# Patient Record
Sex: MALE | Race: WHITE | NOT HISPANIC OR LATINO
[De-identification: names, ages, dates, MRNs, and addresses within clinical notes are randomized per-mention and may not be internally consistent; named-entity substitution may affect disease eponyms.]

---

## 2018-05-29 ENCOUNTER — APPOINTMENT (OUTPATIENT)
Dept: UROLOGY | Facility: CLINIC | Age: 73
End: 2018-05-29
Payer: MEDICARE

## 2018-05-29 DIAGNOSIS — Z86.79 PERSONAL HISTORY OF OTHER DISEASES OF THE CIRCULATORY SYSTEM: ICD-10-CM

## 2018-05-29 DIAGNOSIS — Z80.3 FAMILY HISTORY OF MALIGNANT NEOPLASM OF BREAST: ICD-10-CM

## 2018-05-29 DIAGNOSIS — Z80.1 FAMILY HISTORY OF MALIGNANT NEOPLASM OF TRACHEA, BRONCHUS AND LUNG: ICD-10-CM

## 2018-05-29 DIAGNOSIS — Z86.39 PERSONAL HISTORY OF OTHER ENDOCRINE, NUTRITIONAL AND METABOLIC DISEASE: ICD-10-CM

## 2018-05-29 PROCEDURE — 51741 ELECTRO-UROFLOWMETRY FIRST: CPT

## 2018-05-29 PROCEDURE — 99204 OFFICE O/P NEW MOD 45 MIN: CPT | Mod: 25

## 2018-11-27 ENCOUNTER — APPOINTMENT (OUTPATIENT)
Dept: UROLOGY | Facility: CLINIC | Age: 73
End: 2018-11-27
Payer: MEDICARE

## 2018-11-27 VITALS
DIASTOLIC BLOOD PRESSURE: 74 MMHG | BODY MASS INDEX: 26.32 KG/M2 | SYSTOLIC BLOOD PRESSURE: 130 MMHG | WEIGHT: 188 LBS | HEIGHT: 71 IN

## 2018-11-27 PROCEDURE — 99213 OFFICE O/P EST LOW 20 MIN: CPT

## 2019-12-18 ENCOUNTER — APPOINTMENT (OUTPATIENT)
Dept: UROLOGY | Facility: CLINIC | Age: 74
End: 2019-12-18
Payer: MEDICARE

## 2019-12-18 VITALS
HEART RATE: 66 BPM | DIASTOLIC BLOOD PRESSURE: 80 MMHG | WEIGHT: 188 LBS | HEIGHT: 71 IN | SYSTOLIC BLOOD PRESSURE: 138 MMHG | BODY MASS INDEX: 26.32 KG/M2

## 2019-12-18 PROCEDURE — 99213 OFFICE O/P EST LOW 20 MIN: CPT

## 2019-12-18 NOTE — ASSESSMENT
[FreeTextEntry1] : Very pleasant 74-year-old gentleman presents for followup of BPH and elevated PSA\par -PSA 4.08 this year stable from 4.08 last year\par -Continue finasteride\par -Follow up in one year

## 2019-12-18 NOTE — PHYSICAL EXAM
[General Appearance - Well Developed] : well developed [General Appearance - Well Nourished] : well nourished [Normal Appearance] : normal appearance [General Appearance - In No Acute Distress] : no acute distress [Well Groomed] : well groomed [Abdomen Soft] : soft [Urethral Meatus] : meatus normal [Costovertebral Angle Tenderness] : no ~M costovertebral angle tenderness [Abdomen Tenderness] : non-tender [Scrotum] : the scrotum was normal [Urinary Bladder Findings] : the bladder was normal on palpation [Testes Mass (___cm)] : there were no testicular masses [No Prostate Nodules] : no prostate nodules [Edema] : no peripheral edema [] : no respiratory distress [Exaggerated Use Of Accessory Muscles For Inspiration] : no accessory muscle use [Respiration, Rhythm And Depth] : normal respiratory rhythm and effort [Oriented To Time, Place, And Person] : oriented to person, place, and time [Affect] : the affect was normal [Mood] : the mood was normal [Not Anxious] : not anxious [Normal Station and Gait] : the gait and station were normal for the patient's age [No Focal Deficits] : no focal deficits [No Palpable Adenopathy] : no palpable adenopathy

## 2019-12-18 NOTE — HISTORY OF PRESENT ILLNESS
[FreeTextEntry1] : Very pleasant 74-year-old gentleman who presents for followup of elevated PSA. Patient underwent prostate biopsy in 2013 for a PSA of 9. This demonstrated benign prostate tissue. He was subsequently started on finasteride and PSA decreased to 4 and has been in this range since that time. Last year PSA was 4.08. This year, PSA is 4.08. No change in his urinary symptoms. No other complaints. [Urinary Retention] : no urinary retention [Urinary Frequency] : no urinary frequency [Urinary Urgency] : no urinary urgency [Straining] : no straining [Nocturia] : no nocturia [Dysuria] : no dysuria [Hematuria - Gross] : no gross hematuria [Weak Stream] : no weak stream [Bladder Spasm] : no bladder spasm [Abdominal Pain] : no abdominal pain [Flank Pain] : no flank pain [Fever] : no fever [Nausea] : no nausea [Fatigue] : no fatigue [Anorexia] : no anorexia

## 2020-07-07 ENCOUNTER — APPOINTMENT (OUTPATIENT)
Dept: UROLOGY | Facility: CLINIC | Age: 75
End: 2020-07-07

## 2020-07-21 ENCOUNTER — INPATIENT (INPATIENT)
Facility: HOSPITAL | Age: 75
LOS: 13 days | Discharge: HOME | DRG: 98 | End: 2020-08-04
Attending: PSYCHIATRY & NEUROLOGY | Admitting: PSYCHIATRY & NEUROLOGY
Payer: COMMERCIAL

## 2020-07-21 VITALS
TEMPERATURE: 98 F | HEART RATE: 57 BPM | SYSTOLIC BLOOD PRESSURE: 117 MMHG | RESPIRATION RATE: 17 BRPM | WEIGHT: 184.97 LBS | HEIGHT: 71 IN | DIASTOLIC BLOOD PRESSURE: 69 MMHG

## 2020-07-21 DIAGNOSIS — R90.89 OTHER ABNORMAL FINDINGS ON DIAGNOSTIC IMAGING OF CENTRAL NERVOUS SYSTEM: ICD-10-CM

## 2020-07-21 LAB
ALBUMIN SERPL ELPH-MCNC: 3.9 G/DL — SIGNIFICANT CHANGE UP (ref 3.3–5)
ALP SERPL-CCNC: 65 U/L — SIGNIFICANT CHANGE UP (ref 40–120)
ALT FLD-CCNC: 22 U/L — SIGNIFICANT CHANGE UP (ref 10–45)
ANION GAP SERPL CALC-SCNC: 11 MMOL/L — SIGNIFICANT CHANGE UP (ref 5–17)
AST SERPL-CCNC: 25 U/L — SIGNIFICANT CHANGE UP (ref 10–40)
BASOPHILS # BLD AUTO: 0.04 K/UL — SIGNIFICANT CHANGE UP (ref 0–0.2)
BASOPHILS NFR BLD AUTO: 0.6 % — SIGNIFICANT CHANGE UP (ref 0–2)
BILIRUB SERPL-MCNC: 0.5 MG/DL — SIGNIFICANT CHANGE UP (ref 0.2–1.2)
BUN SERPL-MCNC: 33 MG/DL — HIGH (ref 7–23)
CALCIUM SERPL-MCNC: 9.4 MG/DL — SIGNIFICANT CHANGE UP (ref 8.4–10.5)
CHLORIDE SERPL-SCNC: 101 MMOL/L — SIGNIFICANT CHANGE UP (ref 96–108)
CO2 SERPL-SCNC: 25 MMOL/L — SIGNIFICANT CHANGE UP (ref 22–31)
CREAT SERPL-MCNC: 1.22 MG/DL — SIGNIFICANT CHANGE UP (ref 0.5–1.3)
CRP SERPL-MCNC: 0.22 MG/DL — SIGNIFICANT CHANGE UP (ref 0–0.4)
EOSINOPHIL # BLD AUTO: 0.1 K/UL — SIGNIFICANT CHANGE UP (ref 0–0.5)
EOSINOPHIL NFR BLD AUTO: 1.4 % — SIGNIFICANT CHANGE UP (ref 0–6)
ERYTHROCYTE [SEDIMENTATION RATE] IN BLOOD: 48 MM/HR — HIGH (ref 0–20)
GLUCOSE SERPL-MCNC: 130 MG/DL — HIGH (ref 70–99)
HCT VFR BLD CALC: 41.8 % — SIGNIFICANT CHANGE UP (ref 39–50)
HGB BLD-MCNC: 13.8 G/DL — SIGNIFICANT CHANGE UP (ref 13–17)
IMM GRANULOCYTES NFR BLD AUTO: 0.4 % — SIGNIFICANT CHANGE UP (ref 0–1.5)
LYMPHOCYTES # BLD AUTO: 1.22 K/UL — SIGNIFICANT CHANGE UP (ref 1–3.3)
LYMPHOCYTES # BLD AUTO: 17.5 % — SIGNIFICANT CHANGE UP (ref 13–44)
MCHC RBC-ENTMCNC: 29.9 PG — SIGNIFICANT CHANGE UP (ref 27–34)
MCHC RBC-ENTMCNC: 33 GM/DL — SIGNIFICANT CHANGE UP (ref 32–36)
MCV RBC AUTO: 90.5 FL — SIGNIFICANT CHANGE UP (ref 80–100)
MONOCYTES # BLD AUTO: 0.94 K/UL — HIGH (ref 0–0.9)
MONOCYTES NFR BLD AUTO: 13.5 % — SIGNIFICANT CHANGE UP (ref 2–14)
NEUTROPHILS # BLD AUTO: 4.63 K/UL — SIGNIFICANT CHANGE UP (ref 1.8–7.4)
NEUTROPHILS NFR BLD AUTO: 66.6 % — SIGNIFICANT CHANGE UP (ref 43–77)
NRBC # BLD: 0 /100 WBCS — SIGNIFICANT CHANGE UP (ref 0–0)
PLATELET # BLD AUTO: 236 K/UL — SIGNIFICANT CHANGE UP (ref 150–400)
POTASSIUM SERPL-MCNC: 3.5 MMOL/L — SIGNIFICANT CHANGE UP (ref 3.5–5.3)
POTASSIUM SERPL-SCNC: 3.5 MMOL/L — SIGNIFICANT CHANGE UP (ref 3.5–5.3)
PROT SERPL-MCNC: 6.7 G/DL — SIGNIFICANT CHANGE UP (ref 6–8.3)
RBC # BLD: 4.62 M/UL — SIGNIFICANT CHANGE UP (ref 4.2–5.8)
RBC # FLD: 13 % — SIGNIFICANT CHANGE UP (ref 10.3–14.5)
SARS-COV-2 RNA SPEC QL NAA+PROBE: SIGNIFICANT CHANGE UP
SODIUM SERPL-SCNC: 137 MMOL/L — SIGNIFICANT CHANGE UP (ref 135–145)
WBC # BLD: 6.96 K/UL — SIGNIFICANT CHANGE UP (ref 3.8–10.5)
WBC # FLD AUTO: 6.96 K/UL — SIGNIFICANT CHANGE UP (ref 3.8–10.5)

## 2020-07-21 PROCEDURE — 93010 ELECTROCARDIOGRAM REPORT: CPT

## 2020-07-21 PROCEDURE — 99285 EMERGENCY DEPT VISIT HI MDM: CPT

## 2020-07-21 PROCEDURE — 99223 1ST HOSP IP/OBS HIGH 75: CPT

## 2020-07-21 RX ORDER — ENOXAPARIN SODIUM 100 MG/ML
40 INJECTION SUBCUTANEOUS DAILY
Refills: 0 | Status: DISCONTINUED | OUTPATIENT
Start: 2020-07-21 | End: 2020-08-04

## 2020-07-21 RX ORDER — SIMVASTATIN 20 MG/1
10 TABLET, FILM COATED ORAL AT BEDTIME
Refills: 0 | Status: DISCONTINUED | OUTPATIENT
Start: 2020-07-21 | End: 2020-08-04

## 2020-07-21 RX ORDER — AMLODIPINE BESYLATE 2.5 MG/1
5 TABLET ORAL DAILY
Refills: 0 | Status: DISCONTINUED | OUTPATIENT
Start: 2020-07-21 | End: 2020-07-23

## 2020-07-21 RX ORDER — FINASTERIDE 5 MG/1
5 TABLET, FILM COATED ORAL DAILY
Refills: 0 | Status: DISCONTINUED | OUTPATIENT
Start: 2020-07-21 | End: 2020-08-04

## 2020-07-21 RX ADMIN — ENOXAPARIN SODIUM 40 MILLIGRAM(S): 100 INJECTION SUBCUTANEOUS at 22:23

## 2020-07-21 RX ADMIN — FINASTERIDE 5 MILLIGRAM(S): 5 TABLET, FILM COATED ORAL at 22:22

## 2020-07-21 RX ADMIN — SIMVASTATIN 10 MILLIGRAM(S): 20 TABLET, FILM COATED ORAL at 22:22

## 2020-07-21 NOTE — ED ADULT NURSE NOTE - OBJECTIVE STATEMENT
75y M with Pmhx of BPH and high cholesterol presents to the ED with intermittent upper extremity weakness more so on the L than R. Reports that he has the spasms that starts in the LUE and moves down to the forearm and stiffens. Yesterday pt had a MRI and his PMD sent in. On assessment, AOx3. PERRL 3 mm. HICKS. no facial droop, slurred speech, and arm drift. strength equal in all extremities. no vision changes. Gait unsteady. Denies dizziness, headaches, lightheadedness, numbness and tingling. Breathing spontaneously and unlabored. Sating 97% on Room air. Denies cough, SOB and CP. S1 and S2 heard. No Peripheral edema. Cap refill 2s. No JVD. Peripheral pulses strong and equal bilaterally. Denies CP, SOB and palpitations. Abdomen soft, nontender, nondistended, negative CVA tenderness, positive bowel sounds in all four quadrants. Pt is continent. Denies n/v/d, dysuria, melena and hematuria. IV placed 20g in Left. Labs drawn and sent. Pt safety maintained. Call bell within reach. Side rails in upward position. Pt awaiting dispo. Neuro flowsheet placed in chart.

## 2020-07-21 NOTE — H&P ADULT - ATTENDING COMMENTS
agree with history as above.  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    Plan: review brain MRI  CT chest/ abdomen/pelvis to r/o neoplasia  VEEG monitoring  Lp for paraneoplastic panel from CSF and serum for TRP/TPO, ESR, CRP, MBP, OCB    consider steroids/IVIG as outpatient as well as PLEX if events captured and typical for LG1

## 2020-07-21 NOTE — ED PROVIDER NOTE - CLINICAL SUMMARY MEDICAL DECISION MAKING FREE TEXT BOX
kinza pt with waxing wainng spasm and weakness left sided upper > lower x 1month -- no fever no rash no travel o bug bite no ha -- no ams - sent for outpt mri with abn enhancement of limbic area concerning for enceph v sz v inflam - in light of intermittent nature and weakness after spasm - more likely sz related - will dw neuro - possible eeg - likely admission

## 2020-07-21 NOTE — H&P ADULT - ASSESSMENT
74 y/o male with PMHx BPH, HTN, and HLD presents to ED with intermittent left upper extremity tonic spasms with progression to right upper and lower extremities and multiple falls since June, concerning for seizures. Outpatient MRI brain without contrast demonstrates hyperenhancement of right limbic lobe (pending inpatient neuroradiology read). Patient is not encephalopathic at this time. There are no focal neurological deficits on exam.     Impression: Bilateral upper extremity tonic clonic activity with involvement of right lower extremity secondary to possible focal seizure due to unknown etiology. Differentials remain broad and include autoimmune encephalitis vs paraneoplastic encephalitis vs. other inflammatory encephalitis vs. infectious encephalitis vs. less likely focal dystonia    Plan  -Continuous VEEG  -CT chest/abdomen/pelvis with contrast for paraneoplastic workup  -Repeat mri brain with contrast  -Consider LP depending on above results  -STAT CT head for acute change in mental status  -Monitor for signs of infection    Case discussed with Dr. Justice. 76 y/o male with PMHx BPH, HTN, and HLD presents to ED with intermittent left upper extremity tonic clonic activity with progression to right upper and lower extremities and multiple falls since June, concerning for seizures. Outpatient MRI brain without contrast demonstrates hyperenhancement of right limbic lobe (pending inpatient neuroradiology read). Patient is not encephalopathic at this time. There are no focal neurological deficits on exam.     Impression: Bilateral upper extremity tonic clonic activity with involvement of right lower extremity secondary to possible focal seizure due to unknown etiology vs faciobrachial dystonic seizure. Differentials remain broad and include autoimmune encephalitis including anti-LGl1 encephalitis vs paraneoplastic encephalitis vs. other inflammatory encephalitis vs. infectious encephalitis vs. less likely focal dystonia    Plan  -Continuous VEEG  -CT chest/abdomen/pelvis with contrast for paraneoplastic workup  -Follow up serum autoimmune workup  -Obtain CD with images of outpatient MRI brain without contrast done 7/20/20  -Repeat MRI brain with contrast   -Consider LP depending on above results  -Consider glucocorticoids, IVIG  -STAT CT head for acute change in mental status  -Monitor for signs of infection  -Seizure, fall and aspiration precautions.  Avoid sleep deprivation.   -IV lorazepam 2mg IV prn for seizure activity lasting >3-5mins, >2x/hr, >3x/day, or if GTC , repeat after 1 minute (max dose 0.1 mg/kg)      Case discussed with Dr. Justice. 74 y/o male with PMHx BPH, HTN, and HLD presents to ED with intermittent left upper extremity tonic clonic activity with progression to right upper and lower extremities and multiple falls since June, concerning for seizures. Outpatient MRI brain without contrast demonstrates hyperenhancement of right limbic lobe (pending inpatient neuroradiology read). Patient is not encephalopathic at this time. There are no focal neurological deficits on exam.     Impression: Bilateral upper extremity tonic clonic activity with involvement of right lower extremity secondary to possible focal seizure due to unknown etiology vs faciobrachial dystonic seizure. Differentials remain broad and include autoimmune encephalitis including anti-LGl1 encephalitis vs paraneoplastic encephalitis vs. other inflammatory encephalitis vs. infectious encephalitis vs. less likely focal dystonia    Plan  -Continuous VEEG  -Follow up serum autoimmune workup  -Obtain CD with images of outpatient MRI brain without contrast done 7/20/20  -Repeat MRI brain with contrast   -Consider LP to send for autoimmune/paraneoplatic/infectious panels depending on above results  -Consider CT chest/abdomen/pelvis with contrast for paraneoplastic workup  -Consider glucocorticoids, IVIG  -STAT CT head for acute change in mental status  -Monitor for signs of infection  -Seizure, fall and aspiration precautions.  Avoid sleep deprivation.   -IV lorazepam 2mg IV prn for seizure activity lasting >3-5mins, >2x/hr, >3x/day, or if GTC , repeat after 1 minute (max dose 0.1 mg/kg)      Case discussed with Dr. Justice. 74 y/o male with PMHx BPH, HTN, and HLD presents to ED with intermittent left upper extremity tonic clonic activity with progression to right upper and lower extremities and multiple falls since June, concerning for seizures. Outpatient MRI brain without contrast demonstrates hyperenhancement of right limbic lobe (pending inpatient neuroradiology read). Patient is not encephalopathic at this time. There are no focal neurological deficits on exam.     Impression: Bilateral upper extremity tonic clonic activity with involvement of right lower extremity secondary to possible focal seizure due to unknown etiology vs faciobrachial dystonic seizure. Differentials remain broad and include autoimmune encephalitis including anti-LGl1 encephalitis vs paraneoplastic encephalitis vs. other inflammatory encephalitis vs. infectious encephalitis vs. less likely focal dystonia    Plan  -Continuous VEEG  -Follow up serum autoimmune workup  -Obtain CD with images of outpatient MRI brain without contrast done 7/20/20  -Repeat MRI brain with contrast   -Consider LP to send for autoimmune/paraneoplatic/infectious panels depending on above results  -Consider CT chest/abdomen/pelvis with contrast for paraneoplastic workup  -Consider glucocorticoids, IVIG  -STAT CT head for acute change in mental status  -Monitor for signs of infection  -Seizure, fall and aspiration precautions.  Avoid sleep deprivation.   -Patient's emergency contact is his close friend, Belinda [phone].  -IV lorazepam 2mg IV prn for seizure activity lasting >3-5mins, >2x/hr, >3x/day, or if GTC , repeat after 1 minute (max dose 0.1 mg/kg)      Case discussed with Dr. Justice.

## 2020-07-21 NOTE — ED ADULT NURSE REASSESSMENT NOTE - NS ED NURSE REASSESS COMMENT FT1
Pt remains in the ED. Resting comfortably in bed. A&Ox3. Breathing spontaneously and unlabored. NAD. VSS. Pt ambulated to the bathroom with ED staff. Pt admitted to neuro for abnormal MRI. Pt safety maintained. Will continue to monitor. Awaiting bed.

## 2020-07-21 NOTE — ED ADULT TRIAGE NOTE - NS ED NURSE DIRECT TO ROOM YN
No
You can access the FollowMyHealth Patient Portal offered by [facility] by registering at the following website: [email or web address]. By joining [employer]’s FollowMyHealth portal, you will also be able to view your health information using other applications (apps) compatible with our system.

## 2020-07-21 NOTE — H&P ADULT - NSHPLABSRESULTS_GEN_ALL_CORE
13.8   6.96  )-----------( 236      ( 21 Jul 2020 08:53 )             41.8 CBC Full  -  ( 21 Jul 2020 08:53 )  WBC Count : 6.96 K/uL  RBC Count : 4.62 M/uL  Hemoglobin : 13.8 g/dL  Hematocrit : 41.8 %  Platelet Count - Automated : 236 K/uL  Mean Cell Volume : 90.5 fl  Mean Cell Hemoglobin : 29.9 pg  Mean Cell Hemoglobin Concentration : 33.0 gm/dL  Auto Neutrophil # : 4.63 K/uL  Auto Lymphocyte # : 1.22 K/uL  Auto Monocyte # : 0.94 K/uL  Auto Eosinophil # : 0.10 K/uL  Auto Basophil # : 0.04 K/uL  Auto Neutrophil % : 66.6 %  Auto Lymphocyte % : 17.5 %  Auto Monocyte % : 13.5 %  Auto Eosinophil % : 1.4 %  Auto Basophil % : 0.6 %

## 2020-07-21 NOTE — H&P ADULT - NSHPPHYSICALEXAM_GEN_ALL_CORE
General Exam:   General appearance: No acute distress    Cardiac:  Pulm:                 Neurological Exam:  Mental Status: Orientated to self, date and place.  Attention intact.  No dysarthria. Speech fluent.  Cranial Nerves:   PERRL, EOMI, VFF, no nystagmus.    CN V1-3 intact to light touch .  No facial asymmetry.  Hearing intact to finger rub bilaterally.  Tongue, uvula and palate midline.  Sternocleidomastoid and Trapezius intact bilaterally.    Motor:   Tone: normal.                  Strength:     [] Upper extremity                      Delt       Bicep    Tricep                                                  R         5/5 5/5 5/5 5/5                                               L          5/5 5/5 5/5 5/5  [] Lower extremity                       HF          KE          KF        DF         PF                                               R        5/5 5/5 5/5 5/5 5/5                                               L         5/5 5/5 5/5 5/5 5/5  Pronator drift: none                 Dysmetria: None to finger-nose-finger or heel-shin-heel  No truncal ataxia.    Tremor: No resting, postural or action tremor.  No myoclonus.    Sensation: intact to light touch, pinprick, vibration and proprioception    Deep Tendon Reflexes:     Biceps          Triceps      BR        Patellar        Ankle         Babinski                                         R       2+                   2+           2+            2+               2+           downgoing                                         L        2+                  2+           2+            2+               2+           downgoing    Gait: normal. General: No acute distress, lying in bed comfortably    Neurological Exam  - Mental Status:  Alert and oriented to person, place, time, and president; speech is fluent with intact naming, repetition, and comprehension  - Cranial Nerves II-XII:    II:  PERRLA; visual fields are full to confrontation  III, IV, VI:  EOMI, no nystagmus  V:  facial sensation is intact in the V1-V3 distribution bilaterally.  VII:  face is symmetric with normal eye closure and smile  VIII:  hearing is intact to finger rub  IX, X:  uvula is midline and soft palate rises symmetrically  XI:  head turning and shoulder shrug are intact bilaterally  XII:  tongue protrudes in the midline  - Motor:  strength is 5/5 throughout; normal muscle bulk and tone throughout; no pronator drift  - Reflexes:  2+ and symmetric at the biceps, triceps, brachioradialis, knees, and ankles;  plantar reflexes downgoing bilaterally  - Sensory:  intact to light touch and pinprick, symmetrical in all extremities  - Coordination:  finger-nose-finger and heel-knee-shin intact without dysmetria  - Gait: wide based gait, unsteady; unable to complete tandem gait; Romberg testing is positive. General: No acute distress, lying in bed comfortably  Neurological Exam  - Mental Status:  Alert and oriented to person, place, time, and president; speech is fluent with intact naming, repetition, and comprehension. Able to spell WORLD backwards. 3 word recall intact.  - Cranial Nerves II-XII:    II:  PERRLA; visual fields are full to confrontation  III, IV, VI:  EOMI, no nystagmus  V:  facial sensation is intact in the V1-V3 distribution bilaterally.  VII:  face is symmetric with normal eye closure and smile  VIII:  hearing is intact to finger rub  IX, X:  uvula is midline and soft palate rises symmetrically  XI:  head turning and shoulder shrug are intact bilaterally  XII:  tongue protrudes in the midline  - Motor:  strength is 5/5 throughout; normal muscle bulk and tone throughout; no pronator drift  - Reflexes:  2+ and symmetric at the biceps, triceps, brachioradialis, knees, and ankles;  plantar reflexes downgoing bilaterally  - Sensory:  intact to light touch and pinprick, symmetrical in all extremities  - Coordination:  finger-nose-finger and heel-knee-shin intact without dysmetria  - Gait: wide based gait, unsteady; unable to complete tandem gait; Romberg testing is positive.

## 2020-07-21 NOTE — CHART NOTE - NSCHARTNOTEFT_GEN_A_CORE
Prior outpatient MRI report provided by Dr. Gloria received today. Impression of results as below:    IMPRESSION:  Asymmetric FLAIR hyperintensity in the right limbic lobe involving the hippocampal gyrus. Differential diagnosis includes, but is not limited to, infectious/inflammatory encephalitis, limbic encephalitis and sequela of epilepsy/post ictal edema. Additional mild chronic microvascular changes without acute infarct.

## 2020-07-21 NOTE — H&P ADULT - HISTORY OF PRESENT ILLNESS
HPI:  74 y/o male with PMHx BPH and HLD presenting with intermittent LUE spasms and weakness since early June. Patient had an MRI brain showing enhancement of right limbic. He denies abnormal shaking, changes in behavior, urinary incontinence, tongue biting, loss of consciousness, syncope, confusion.    Home medications:    PAST MEDICAL & SURGICAL HISTORY:  HLD (hyperlipidemia)  BPH (benign prostatic hyperplasia)  No significant past surgical history    FAMILY HISTORY:    Allergies    No Known Allergies    Intolerances        SHx - No smoking, No ETOH, No drug abuse      Review of Systems:  CONSTITUTIONAL:   HEENT:  No visual loss, blurred vision, double vision.  No hearing loss, sneezing, congestion, runny nose or sore throat.  SKIN:  No rash or itching.  CARDIOVASCULAR:  No chest pain, chest pressure or chest discomfort. No palpitations or edema.  RESPIRATORY:  No shortness of breath, cough or sputum.  GASTROINTESTINAL:  No anorexia, nausea, vomiting or diarrhea. No abdominal pain.  GENITOURINARY:  NO dysuria. No increased frequency. No retention. No incontinence.  NEUROLOGICAL:  See HPI  MUSCULOSKELETAL:  No muscle, back pain, joint pain or stiffness.  HEMATOLOGIC:  No anemia, bleeding or bruising.  PSYCHIATRIC:    ENDOCRINOLOGIC:  No reports of sweating, cold or heat intolerance. No polyuria or polydipsia. HPI:  76 y/o male with PMHx BPH, HTN, and HLD presents to ED with intermittent left upper extremity spasms and weakness since the first week of June. Patient reports that symptoms have progressed to include his right upper and lower extremity since late June. He states that in the beginning of June, he had pain below his left ear. A couple weeks later, patient started to have stiffness of his left upper extremity that felt like spasms and accompanied by shaking. He has started to have involvement of his right side shortly after that included his right upper and right lower extremity. The shaking is usually followed by weakness, which lasts a few minutes before he regains function. The weakness in his arms has led him to break dishes multiple times. He does not know how often his episodes occur, but says they are very frequent when he does his daily activities. He cannot recall exactly when the most recent episode was. The patient has had multiple falls since June, the last one was a couple weeks ago that he attributes to loss of balance. He denies syncope, loss of consciousness, tongue biting, and urinary incontinence. Patient does not ambulate with mechanical assistance at baseline. Patient lives alone and says that the episodes have not been witnessed.  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     Patient denies fevers, chills, abdominal pain, cough, chest pain, nausea, vomiting, numbness, tingling, headaches, vision changes, sick contacts, confusion, memory difficulties.    Home medications:  Finasteride  Amlodipine  Simvastatin    PAST MEDICAL & SURGICAL HISTORY:  HLD (hyperlipidemia)  BPH (benign prostatic hyperplasia)  No significant past surgical history    Allergies  No Known Allergies    SHx - No smoking, No ETOH, No drug abuse    Review of Systems:  CONSTITUTIONAL:   HEENT:  No visual loss, blurred vision, double vision.  No hearing loss, sneezing, congestion, runny nose or sore throat.  SKIN:  No rash or itching.  CARDIOVASCULAR:  No chest pain, chest pressure or chest discomfort. No palpitations or edema.  RESPIRATORY:  No shortness of breath, cough or sputum.  GASTROINTESTINAL:  No anorexia, nausea, vomiting or diarrhea. No abdominal pain.  GENITOURINARY:  No dysuria. No increased frequency. No retention. No incontinence.  NEUROLOGICAL:  See HPI  MUSCULOSKELETAL:  No muscle, back pain, joint pain or stiffness. HPI:  76 y/o male with PMHx BPH, HTN, and HLD presents to ED with intermittent left upper extremity spasms and weakness since the first week of June. Patient reports that symptoms have progressed to include his right upper and lower extremity since late June. He states that in the beginning of June, he had pain below his left ear. A couple weeks later, patient started to have stiffness of his left upper extremity that felt like spasms and was accompanied by shaking. He then began to have involvement of his right side with involvement of his right upper and right lower extremity. The shaking is usually followed by weakness, which lasts a few minutes before he regains function. The weakness in his arms has led him to break dishes multiple times. Patient states that holding a weighted object may precipitate the episodes. He does not know how often the episodes occur, but says they are very frequent when he does his daily activities. He cannot recall exactly when the last one was. The patient has had multiple falls since June, most recently a couple weeks ago that he attributes to loss of balance. He denies syncope, loss of consciousness, tongue biting, and urinary incontinence. Patient denies previous history of a neurological disorder, including seizures. Patient does not ambulate with mechanical assistance at baseline. He lives alone and says that the episodes have not been witnessed.  Patient had an MRI brain without contrast yesterday after going to a private neurologist. He states that his friend Belinda will bring in CD with images to be reviewed.    Patient denies weight loss, fevers, chills, abdominal pain, cough, chest pain, nausea, vomiting, numbness, tingling, headaches, vision changes, sick contacts, confusion, memory difficulties, change in behavior/personality.    Home medications:  Finasteride  Amlodipine  Simvastatin    PAST MEDICAL & SURGICAL HISTORY:  HLD (hyperlipidemia)  BPH (benign prostatic hyperplasia)  No significant past surgical history    Allergies  No Known Allergies    SHx - No smoking, No ETOH, No drug abuse    Review of Systems:  CONSTITUTIONAL:   HEENT:  No visual loss, blurred vision, double vision.  No hearing loss, sneezing, congestion, runny nose or sore throat.  SKIN:  No rash or itching.  CARDIOVASCULAR:  No chest pain, chest pressure or chest discomfort. No palpitations or edema.  RESPIRATORY:  No shortness of breath, cough or sputum.  GASTROINTESTINAL:  No anorexia, nausea, vomiting or diarrhea. No abdominal pain.  GENITOURINARY:  No dysuria. No increased frequency. No retention. No incontinence.  NEUROLOGICAL:  See HPI  MUSCULOSKELETAL:  No muscle, back pain, joint pain or stiffness. HPI:  74 y/o RH male with PMHx BPH, HTN, and HLD presents to ED with intermittent left upper extremity spasms and weakness since the first week of June. Patient reports that symptoms have progressed to include his right upper and lower extremity since late June. He states that in the beginning of June, he had pain below his left ear. A couple weeks later, patient started to have stiffness of his left upper extremity that felt like spasms and was accompanied by shaking. He then began to have involvement of his right side with involvement of his right upper and right lower extremity. The shaking is usually followed by weakness, which lasts a few minutes before he regains full function. The weakness in his arms has led him to break dishes multiple times. Patient states that holding a weighted object may precipitate the episodes. He does not know how often the episodes occur, but says they are very frequent when he does his daily activities. He cannot recall exactly when the last one was. The patient has had multiple falls since June, most recently a couple weeks ago that he attributes to loss of balance. He denies syncope, loss of consciousness, tongue biting, and urinary incontinence. Patient denies previous history of a neurological disorder, including seizures. Patient does not ambulate with mechanical assistance at baseline. He lives alone and says that the episodes have not been witnessed.  Patient had an MRI brain without contrast yesterday after going to a private neurologist. He states that his friend Belinda will bring in CD with images to be reviewed.    Patient denies weight loss, fevers, chills, abdominal pain, cough, chest pain, nausea, vomiting, numbness, tingling, headaches, vision changes, sick contacts, confusion, memory difficulties, change in behavior/personality, and sleep disturbances.    Home medications:  Finasteride 5mg daily  Amlodipine 5mg daily  Simvastatin 10mg nightly  Tizanidine 2mg at bedtime PRN    PAST MEDICAL & SURGICAL HISTORY:  HLD (hyperlipidemia)  BPH (benign prostatic hyperplasia)  No significant past surgical history    Allergies  No Known Allergies    SHx - No smoking, No ETOH, No drug abuse    Review of Systems:  CONSTITUTIONAL:   HEENT:  No visual loss, blurred vision, double vision.  No hearing loss, sneezing, congestion, runny nose or sore throat.  SKIN:  No rash or itching.  CARDIOVASCULAR:  No chest pain, chest pressure or chest discomfort. No palpitations or edema.  RESPIRATORY:  No shortness of breath, cough or sputum.  GASTROINTESTINAL:  No anorexia, nausea, vomiting or diarrhea. No abdominal pain.  GENITOURINARY:  No dysuria. No increased frequency. No retention. No incontinence.  NEUROLOGICAL:  See HPI  MUSCULOSKELETAL:  No muscle, back pain, joint pain or stiffness. HPI:  76 y/o RH male with PMHx BPH, HTN, and HLD presents to ED with intermittent left upper extremity spasms and weakness since the first week of June. Patient reports that symptoms have progressed to include his right upper and lower extremity since late June. He states that in the beginning of June, he had pain below his left ear. A couple weeks later, patient started to have stiffness of his left upper extremity that felt like spasms and was accompanied by shaking. He then began to have involvement of his right side with involvement of his right upper and right lower extremity. The shaking is usually followed by weakness, which lasts a few minutes before he regains full function. The weakness in his arms has led him to break dishes multiple times. Patient states that holding a weighted object may precipitate the episodes. He does not know how often the episodes occur, but says they are very frequent when he does his daily activities. He cannot recall exactly when the last one was. The patient has had multiple falls since June, most recently a couple weeks ago that he attributes to loss of balance. He denies syncope, loss of consciousness, tongue biting, and urinary incontinence. Patient denies previous history of a neurological disorder, including seizures. Patient does not ambulate with mechanical assistance at baseline. He lives alone and says that the episodes have not been witnessed.  Patient had an MRI brain without contrast yesterday after going to a private neurologist. He states that his friend Belinda ([phone]) will bring in CD with images to be reviewed.    Patient denies weight loss, fevers, chills, abdominal pain, cough, chest pain, nausea, vomiting, numbness, tingling, headaches, vision changes, sick contacts, confusion, memory difficulties, change in behavior/personality, and sleep disturbances.    Home medications:  Finasteride 5mg daily  Amlodipine 5mg daily  Simvastatin 10mg nightly  Tizanidine 2mg at bedtime PRN    PAST MEDICAL & SURGICAL HISTORY:  HLD (hyperlipidemia)  BPH (benign prostatic hyperplasia)  No significant past surgical history    Allergies  No Known Allergies    SHx - No smoking, No ETOH, No drug abuse    Review of Systems:  CONSTITUTIONAL:   HEENT:  No visual loss, blurred vision, double vision.  No hearing loss, sneezing, congestion, runny nose or sore throat.  SKIN:  No rash or itching.  CARDIOVASCULAR:  No chest pain, chest pressure or chest discomfort. No palpitations or edema.  RESPIRATORY:  No shortness of breath, cough or sputum.  GASTROINTESTINAL:  No anorexia, nausea, vomiting or diarrhea. No abdominal pain.  GENITOURINARY:  No dysuria. No increased frequency. No retention. No incontinence.  NEUROLOGICAL:  See HPI  MUSCULOSKELETAL:  No muscle, back pain, joint pain or stiffness.

## 2020-07-21 NOTE — CONSULT NOTE ADULT - ASSESSMENT
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

## 2020-07-21 NOTE — ED ADULT NURSE NOTE - CHIEF COMPLAINT QUOTE
pt sent by pmd for abnormal MRI.  pt denies any fever, chills, or recent sick contacts.  contact for medical information is [facility] [phone].

## 2020-07-21 NOTE — H&P ADULT - NSHPREVIEWOFSYSTEMS_GEN_ALL_CORE
CONSTITUTIONAL:   HEENT:  No visual loss, blurred vision, double vision.  No hearing loss, sneezing, congestion, runny nose or sore throat.  SKIN:  No rash or itching.  CARDIOVASCULAR:  No chest pain, chest pressure or chest discomfort. No palpitations or edema.  RESPIRATORY:  No shortness of breath, cough or sputum.  GASTROINTESTINAL:  No anorexia, nausea, vomiting or diarrhea. No abdominal pain.  GENITOURINARY:  NO dysuria. No increased frequency. No retention. No incontinence.  NEUROLOGICAL:  See HPI  MUSCULOSKELETAL:  No muscle, back pain, joint pain or stiffness.  HEMATOLOGIC:  No anemia, bleeding or bruising.  PSYCHIATRIC:    ENDOCRINOLOGIC:  No reports of sweating, cold or heat intolerance. No polyuria or polydipsia. HEENT:  No visual loss, blurred vision, double vision.  No hearing loss, sneezing, congestion, runny nose or sore throat.  SKIN:  No rash or itching.  CARDIOVASCULAR:  No chest pain, chest pressure or chest discomfort. No palpitations or edema.  RESPIRATORY:  No shortness of breath, cough or sputum.  GASTROINTESTINAL:  No anorexia, nausea, vomiting or diarrhea. No abdominal pain.  GENITOURINARY:  NO dysuria. No increased frequency. No retention. No incontinence.  NEUROLOGICAL:  See HPI  MUSCULOSKELETAL:  No muscle, back pain, joint pain or stiffness.  HEMATOLOGIC:  No anemia, bleeding or bruising    ENDOCRINOLOGIC:  No reports of sweating, cold or heat intolerance. No polyuria or polydipsia.

## 2020-07-21 NOTE — ED ADULT NURSE NOTE - NSIMPLEMENTINTERV_GEN_ALL_ED
Implemented All Fall Risk Interventions:  [place] to call system. Call bell, personal items and telephone within reach. Instruct patient to call for assistance. Room bathroom lighting operational. Non-slip footwear when patient is off stretcher. Physically safe environment: no spills, clutter or unnecessary equipment. Stretcher in lowest position, wheels locked, appropriate side rails in place. Provide visual cue, wrist band, yellow gown, etc. Monitor gait and stability. Monitor for mental status changes and reorient to person, place, and time. Review medications for side effects contributing to fall risk. Reinforce activity limits and safety measures with patient and family.

## 2020-07-21 NOTE — ED ADULT TRIAGE NOTE - CHIEF COMPLAINT QUOTE
pt sent by pmd for abnormal MRI.  pt denies any fever, chills, or recent sick contacts.  contact for medical information is [facility] [phone].

## 2020-07-21 NOTE — ED PROVIDER NOTE - OBJECTIVE STATEMENT
76 y/o M hx of BPH, HLD presenting with abnormal MRI.    Since early June, patient has been experiencing intermittent LUE 'spasms' which he noted with decreased  strength associated with sensation of being off balance. No fevers/chills, hx of seizures, tick bites or prior malignancies diagnosed. Underwent MRI showing enhancement of right limbic lobe. Lives alone but denies any seizure activity, syncopal episodes or change in behavior. 76 y/o M hx of BPH, HLD presenting with abnormal MRI.    Since early June, patient has been experiencing intermittent LUE 'spasms' & weakness which he noted with decreased  strength associated with sensation of being off balance. No fevers/chills, hx of seizures, tick bites or prior malignancies diagnosed. Underwent MRI showing enhancement of right limbic lobe. Lives alone but denies any seizure activity, syncopal episodes or change in behavior.

## 2020-07-21 NOTE — CONSULT NOTE ADULT - SUBJECTIVE AND OBJECTIVE BOX
Neurology Consult Note    Patient is a 75y old  Male who presents with a chief complaint of limbic encephalitis (21 Jul 2020 09:54)      The patient is a 75y Male without  a sig history  w recent c/o episodes of stiffening then jerking of L arm sometimes followed by R side lasting seconds,  ? 5-10x/day, he denies HA, visual memory cognitive sx, lateralized sx in btw events    MEDICATIONS  (STANDING):  amLODIPine   Tablet 5 milliGRAM(s) Oral daily  enoxaparin Injectable 40 milliGRAM(s) SubCutaneous daily  finasteride 5 milliGRAM(s) Oral daily  simvastatin 10 milliGRAM(s) Oral at bedtime    MEDICATIONS  (PRN):  LORazepam   Injectable 2 milliGRAM(s) IV Push once PRN Seizure activity      PAST MEDICAL & SURGICAL HISTORY:  Hypertension  HLD (hyperlipidemia)  BPH (benign prostatic hyperplasia)  No significant past surgical history      FAMILY HISTORY:      Allergies    No Known Allergies    Intolerances        SOCIAL HISTORY:  type ~  then ?H&P    REVIEW OF SYSTEMS  General:(-),Skin/Breast:(-),Ophthalmologic:(-),ENMT:(-),Respiratory and Thorax:(-),Cardiovascular:(-),Gastrointestinal:(-),Genitourinary:(-),Musculoskeletal:(-),Psychiatric:(-),Hematology/Lymphatics:(-),Endocrine:(-),Allergic/Immunologic:(-)    Vital Signs Last 24 Hrs  T(C): 36.1 (21 Jul 2020 15:43), Max: 36.7 (21 Jul 2020 08:04)  T(F): 97 (21 Jul 2020 15:43), Max: 98.1 (21 Jul 2020 10:37)  HR: 58 (21 Jul 2020 15:43) (46 - 70)  BP: 129/75 (21 Jul 2020 15:43) (113/67 - 129/75)  BP(mean): --  RR: 17 (21 Jul 2020 15:43) (16 - 18)  SpO2: 97% (21 Jul 2020 15:43) (97% - 99%)    Physical exam  GENERAL-WDWN     EYES- non-icteric disks obscured  MENTAL STATUS- Alert, attends, fluent, non-dysarthric, follows commands, oriented, good memory and affect.?sluggish  CRANIAL NERVES-II Optic - Bilateral - Normal Visual Fields. III-IV-VI EOMI & Pupils - Bilateral - Normal Bilaterally. V Trigeminal - Bilateral - Normal bilateral facial sensation and jaw movement. VII Facial - Normal bilateral facial movement. VIII Acoustic - Bilateral - Hearing normal to voice bilaterally. IX Glossopharyngeal / X Vagus - Normal palate elevates symmetrically. XI Accessory - Normal Bilaterally. XII Hypoglossal - Tongue protrudes midline and moves symmetrically.  MOTOR-Bulk and Contour - Normal. Tone - Normal tone, no abnormal movements. Strength - 5/5 normal muscle strength - Strength full throughout.  SENSORY-Normal - LT, DSS, Vibration.  REFLEXES- DTR's 2+ throughout.  COORDINATION-Normal FFM, SAMANTHA, FNF - bilaterally.  GAIT-NA    CBC Full  -  ( 21 Jul 2020 08:53 )  WBC Count : 6.96 K/uL  RBC Count : 4.62 M/uL  Hemoglobin : 13.8 g/dL  Hematocrit : 41.8 %  Platelet Count - Automated : 236 K/uL  Mean Cell Volume : 90.5 fl  Mean Cell Hemoglobin : 29.9 pg  Mean Cell Hemoglobin Concentration : 33.0 gm/dL  Auto Neutrophil # : 4.63 K/uL  Auto Lymphocyte # : 1.22 K/uL  Auto Monocyte # : 0.94 K/uL  Auto Eosinophil # : 0.10 K/uL  Auto Basophil # : 0.04 K/uL  Auto Neutrophil % : 66.6 %  Auto Lymphocyte % : 17.5 %  Auto Monocyte % : 13.5 %  Auto Eosinophil % : 1.4 %  Auto Basophil % : 0.6 %      07-21    137  |  101  |  33<H>  ----------------------------<  130<H>  3.5   |  25  |  1.22    Ca    9.4      21 Jul 2020 08:53    TPro  6.7  /  Alb  3.9  /  TBili  0.5  /  DBili  x   /  AST  25  /  ALT  22  /  AlkPhos  65  07-21    LIVER FUNCTIONS - ( 21 Jul 2020 08:53 )  Alb: 3.9 g/dL / Pro: 6.7 g/dL / ALK PHOS: 65 U/L / ALT: 22 U/L / AST: 25 U/L / GGT: x

## 2020-07-21 NOTE — CHART NOTE - NSCHARTNOTEFT_GEN_A_CORE
NEUROLOGY  BRIEF UPDATE     Referred to us via Outpt neurologist Dr. Melvina Scott via Dr. Gloria  Admitted to EMU under Dr. Justice    74 yo man with subacute onset of bilateral Faciobrachial movements, admitted for further evaluation given abnormal non-contrast outpt MRI reported. Also reports unsteady gait and weakness after "these spasms."  Pt lives alone, denies any changes in his behavior or personality, or cognitive issues, states he takes care of all his ADLs including finances.  Former smoker, quit in 1994.   Does report possible new/recent hoarseness upon asking about it.    Denies HA, vision changes LOC, fever, night sweats.     Retired  and stock market .    PT seen and examined upon arrival to EMU.      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 Altho    Plan per H&P.    Pt was explained the reason for his admission, questions and concerns addressed.   f/u AM labs    Plan of care d/w Dr. Justice.

## 2020-07-22 DIAGNOSIS — G04.90 ENCEPHALITIS AND ENCEPHALOMYELITIS, UNSPECIFIED: ICD-10-CM

## 2020-07-22 DIAGNOSIS — R00.1 BRADYCARDIA, UNSPECIFIED: ICD-10-CM

## 2020-07-22 LAB
ANION GAP SERPL CALC-SCNC: 11 MMOL/L — SIGNIFICANT CHANGE UP (ref 5–17)
APTT BLD: 30.2 SEC — SIGNIFICANT CHANGE UP (ref 27.5–35.5)
BASOPHILS # BLD AUTO: 0.05 K/UL — SIGNIFICANT CHANGE UP (ref 0–0.2)
BASOPHILS NFR BLD AUTO: 0.7 % — SIGNIFICANT CHANGE UP (ref 0–2)
BUN SERPL-MCNC: 25 MG/DL — HIGH (ref 7–23)
C3 SERPL-MCNC: 99 MG/DL — SIGNIFICANT CHANGE UP (ref 81–157)
C4 SERPL-MCNC: 27 MG/DL — SIGNIFICANT CHANGE UP (ref 13–39)
CALCIUM SERPL-MCNC: 8.7 MG/DL — SIGNIFICANT CHANGE UP (ref 8.4–10.5)
CHLORIDE SERPL-SCNC: 102 MMOL/L — SIGNIFICANT CHANGE UP (ref 96–108)
CO2 SERPL-SCNC: 22 MMOL/L — SIGNIFICANT CHANGE UP (ref 22–31)
CREAT SERPL-MCNC: 0.92 MG/DL — SIGNIFICANT CHANGE UP (ref 0.5–1.3)
CRP SERPL-MCNC: 0.22 MG/DL — SIGNIFICANT CHANGE UP (ref 0–0.4)
EOSINOPHIL # BLD AUTO: 0.1 K/UL — SIGNIFICANT CHANGE UP (ref 0–0.5)
EOSINOPHIL NFR BLD AUTO: 1.5 % — SIGNIFICANT CHANGE UP (ref 0–6)
ERYTHROCYTE [SEDIMENTATION RATE] IN BLOOD: 45 MM/HR — HIGH (ref 0–20)
FOLATE SERPL-MCNC: 7.5 NG/ML — SIGNIFICANT CHANGE UP
GLUCOSE SERPL-MCNC: 92 MG/DL — SIGNIFICANT CHANGE UP (ref 70–99)
HCT VFR BLD CALC: 42.2 % — SIGNIFICANT CHANGE UP (ref 39–50)
HGB BLD-MCNC: 13.9 G/DL — SIGNIFICANT CHANGE UP (ref 13–17)
HIV 1 & 2 AB SERPL IA.RAPID: SIGNIFICANT CHANGE UP
IMM GRANULOCYTES NFR BLD AUTO: 0.3 % — SIGNIFICANT CHANGE UP (ref 0–1.5)
INR BLD: 1.15 RATIO — SIGNIFICANT CHANGE UP (ref 0.88–1.16)
LYMPHOCYTES # BLD AUTO: 1.37 K/UL — SIGNIFICANT CHANGE UP (ref 1–3.3)
LYMPHOCYTES # BLD AUTO: 20.4 % — SIGNIFICANT CHANGE UP (ref 13–44)
MAGNESIUM SERPL-MCNC: 2.1 MG/DL — SIGNIFICANT CHANGE UP (ref 1.6–2.6)
MCHC RBC-ENTMCNC: 29.4 PG — SIGNIFICANT CHANGE UP (ref 27–34)
MCHC RBC-ENTMCNC: 32.9 GM/DL — SIGNIFICANT CHANGE UP (ref 32–36)
MCV RBC AUTO: 89.4 FL — SIGNIFICANT CHANGE UP (ref 80–100)
MONOCYTES # BLD AUTO: 0.66 K/UL — SIGNIFICANT CHANGE UP (ref 0–0.9)
MONOCYTES NFR BLD AUTO: 9.8 % — SIGNIFICANT CHANGE UP (ref 2–14)
NEUTROPHILS # BLD AUTO: 4.53 K/UL — SIGNIFICANT CHANGE UP (ref 1.8–7.4)
NEUTROPHILS NFR BLD AUTO: 67.3 % — SIGNIFICANT CHANGE UP (ref 43–77)
NRBC # BLD: 0 /100 WBCS — SIGNIFICANT CHANGE UP (ref 0–0)
PLATELET # BLD AUTO: 209 K/UL — SIGNIFICANT CHANGE UP (ref 150–400)
POTASSIUM SERPL-MCNC: 3.8 MMOL/L — SIGNIFICANT CHANGE UP (ref 3.5–5.3)
POTASSIUM SERPL-SCNC: 3.8 MMOL/L — SIGNIFICANT CHANGE UP (ref 3.5–5.3)
PROTHROM AB SERPL-ACNC: 13.5 SEC — SIGNIFICANT CHANGE UP (ref 10.6–13.6)
RBC # BLD: 4.72 M/UL — SIGNIFICANT CHANGE UP (ref 4.2–5.8)
RBC # FLD: 13 % — SIGNIFICANT CHANGE UP (ref 10.3–14.5)
SARS-COV-2 IGG SERPL QL IA: NEGATIVE — SIGNIFICANT CHANGE UP
SARS-COV-2 IGM SERPL IA-ACNC: 0.18 INDEX — SIGNIFICANT CHANGE UP
SODIUM SERPL-SCNC: 135 MMOL/L — SIGNIFICANT CHANGE UP (ref 135–145)
THYROGLOB AB FLD IA-ACNC: <20 IU/ML — SIGNIFICANT CHANGE UP
THYROGLOB AB SERPL-ACNC: <20 IU/ML — SIGNIFICANT CHANGE UP
THYROPEROXIDASE AB SERPL-ACNC: 58.7 IU/ML — HIGH
TSH SERPL-MCNC: 1.8 UIU/ML — SIGNIFICANT CHANGE UP (ref 0.27–4.2)
VIT B12 SERPL-MCNC: 287 PG/ML — SIGNIFICANT CHANGE UP (ref 232–1245)
WBC # BLD: 6.73 K/UL — SIGNIFICANT CHANGE UP (ref 3.8–10.5)
WBC # FLD AUTO: 6.73 K/UL — SIGNIFICANT CHANGE UP (ref 3.8–10.5)

## 2020-07-22 PROCEDURE — 99233 SBSQ HOSP IP/OBS HIGH 50: CPT

## 2020-07-22 PROCEDURE — 36556 INSERT NON-TUNNEL CV CATH: CPT

## 2020-07-22 PROCEDURE — 76937 US GUIDE VASCULAR ACCESS: CPT | Mod: 26

## 2020-07-22 PROCEDURE — 70553 MRI BRAIN STEM W/O & W/DYE: CPT | Mod: 26

## 2020-07-22 PROCEDURE — 77001 FLUOROGUIDE FOR VEIN DEVICE: CPT | Mod: 26

## 2020-07-22 PROCEDURE — 93306 TTE W/DOPPLER COMPLETE: CPT | Mod: 26

## 2020-07-22 PROCEDURE — 95720 EEG PHY/QHP EA INCR W/VEEG: CPT

## 2020-07-22 RX ORDER — POLYETHYLENE GLYCOL 3350 17 G/17G
17 POWDER, FOR SOLUTION ORAL DAILY
Refills: 0 | Status: DISCONTINUED | OUTPATIENT
Start: 2020-07-22 | End: 2020-08-04

## 2020-07-22 RX ORDER — SODIUM CHLORIDE 9 MG/ML
10 INJECTION INTRAMUSCULAR; INTRAVENOUS; SUBCUTANEOUS
Refills: 0 | Status: DISCONTINUED | OUTPATIENT
Start: 2020-07-22 | End: 2020-08-04

## 2020-07-22 RX ORDER — CHLORHEXIDINE GLUCONATE 213 G/1000ML
1 SOLUTION TOPICAL
Refills: 0 | Status: DISCONTINUED | OUTPATIENT
Start: 2020-07-22 | End: 2020-08-04

## 2020-07-22 RX ORDER — SODIUM CHLORIDE 9 MG/ML
1000 INJECTION INTRAMUSCULAR; INTRAVENOUS; SUBCUTANEOUS
Refills: 0 | Status: DISCONTINUED | OUTPATIENT
Start: 2020-07-22 | End: 2020-07-23

## 2020-07-22 RX ORDER — PREGABALIN 225 MG/1
1000 CAPSULE ORAL DAILY
Refills: 0 | Status: COMPLETED | OUTPATIENT
Start: 2020-07-23 | End: 2020-07-29

## 2020-07-22 RX ORDER — FINASTERIDE 5 MG/1
1 TABLET, FILM COATED ORAL
Qty: 0 | Refills: 0 | DISCHARGE

## 2020-07-22 RX ADMIN — SIMVASTATIN 10 MILLIGRAM(S): 20 TABLET, FILM COATED ORAL at 21:03

## 2020-07-22 RX ADMIN — FINASTERIDE 5 MILLIGRAM(S): 5 TABLET, FILM COATED ORAL at 11:39

## 2020-07-22 RX ADMIN — AMLODIPINE BESYLATE 5 MILLIGRAM(S): 2.5 TABLET ORAL at 05:07

## 2020-07-22 NOTE — PROGRESS NOTE ADULT - ATTENDING COMMENTS
agree with history as above  events c/w beatrice brachial dystonic seizures, some of them w/ EEG correlate.    Plan: off EEG  MRI w/ contrast, chest /abdomen/pelvis CT to r/o neoplasia and Lp today.    strongly suggest PLEX due to relative resistance of solumedrol and IVIG for this condition

## 2020-07-22 NOTE — PROGRESS NOTE ADULT - SUBJECTIVE AND OBJECTIVE BOX
*************************************  NEUROLOGY PROGRESS NOTE  **************************************    DENA FARNSWORTH  Male  MRN-[id number]    Subjective:  No acute events overnight. Today patient feels...      VITAL SIGNS:  Vital Signs Last 24 Hrs  T(C): 36.5 (22 Jul 2020 04:47), Max: 36.7 (21 Jul 2020 08:04)  T(F): 97.7 (22 Jul 2020 04:47), Max: 98.1 (21 Jul 2020 10:37)  HR: 56 (22 Jul 2020 04:47) (46 - 70)  BP: 115/74 (22 Jul 2020 04:47) (109/61 - 129/75)  BP(mean): --  RR: 18 (22 Jul 2020 04:47) (16 - 18)  SpO2: 98% (22 Jul 2020 04:47) (95% - 99%)    MEDICATIONS  (STANDING):  amLODIPine   Tablet 5 milliGRAM(s) Oral daily  enoxaparin Injectable 40 milliGRAM(s) SubCutaneous daily  finasteride 5 milliGRAM(s) Oral daily  simvastatin 10 milliGRAM(s) Oral at bedtime    MEDICATIONS  (PRN):  LORazepam   Injectable 2 milliGRAM(s) IV Push once PRN Seizure activity      PHYSICAL EXAMINATION:  General: Well-developed, well nourished, in no acute distress.  Neurologic:  - Mental Status:  Alert, awake, oriented to person, place, and time; Speech is fluent but hoarse with intact naming, repetition, and comprehension. Slowed speech.  - Cranial Nerves II-XII:  VFF, No nystagmus or APD noted, EOMI, PERRLA, V1-V3 intact, slight R nasolabial flattening, t/p midline, SCM/trap intact.  - Motor:  Strength is 5/5 throughout.  There is no pronator drift.  Normal muscle bulk and tone throughout. Occasional dystonic movements of the R and L arms.  - Reflexes:  2+ and symmetric at the biceps, triceps, brachioradialis, knees, and ankles.  Plantar responses flexor.  - Sensory:  Intact to light touch, pin prick, vibration, and joint-position sense throughout.  - Coordination:  Finger-nose-finger and heel-knee-shin intact without dysmetria.  Rapid alternating hand movements intact.  - Gait:   Normal steps, base, arm swing, and turning.  Heel and toe walking are normal.     LABS:                          13.8   6.96  )-----------( 236      ( 21 Jul 2020 08:53 )             41.8     07-21    137  |  101  |  33<H>  ----------------------------<  130<H>  3.5   |  25  |  1.22    Ca    9.4      21 Jul 2020 08:53    TPro  6.7  /  Alb  3.9  /  TBili  0.5  /  DBili  x   /  AST  25  /  ALT  22  /  AlkPhos  65  07-21          RADIOLOGY & ADDITIONAL STUDIES: *************************************  NEUROLOGY PROGRESS NOTE  **************************************    DENA FARNSWORTH  Male  MRN-[id number]    Subjective:  No acute events overnight. Today patient feels well. Denies any acute changes, no new numbness, tingling, weakness. States that he continues to have abnormal movements of his left and right arm.       VITAL SIGNS:  Vital Signs Last 24 Hrs  T(C): 36.5 (22 Jul 2020 04:47), Max: 36.7 (21 Jul 2020 08:04)  T(F): 97.7 (22 Jul 2020 04:47), Max: 98.1 (21 Jul 2020 10:37)  HR: 56 (22 Jul 2020 04:47) (46 - 70)  BP: 115/74 (22 Jul 2020 04:47) (109/61 - 129/75)  BP(mean): --  RR: 18 (22 Jul 2020 04:47) (16 - 18)  SpO2: 98% (22 Jul 2020 04:47) (95% - 99%)    MEDICATIONS  (STANDING):  amLODIPine   Tablet 5 milliGRAM(s) Oral daily  enoxaparin Injectable 40 milliGRAM(s) SubCutaneous daily  finasteride 5 milliGRAM(s) Oral daily  simvastatin 10 milliGRAM(s) Oral at bedtime    MEDICATIONS  (PRN):  LORazepam   Injectable 2 milliGRAM(s) IV Push once PRN Seizure activity      PHYSICAL EXAMINATION:  General: Well-developed, well nourished, in no acute distress.  Neurologic:  - Mental Status:  Alert, awake, oriented to person, place, and time; Speech is fluent but hoarse with intact naming, repetition, and comprehension. Slowed speech.  - Cranial Nerves II-XII:  VFF, No nystagmus or APD noted, EOMI, saccadic movements on tracking, PERRLA, V1-V3 intact, slight R nasolabial flattening, t/p midline, SCM/trap intact.  - Motor:  Strength is 5/5 throughout.  There is no pronator drift.  Normal muscle bulk. Slight rigidity in the lower extremities bilaterally R>L. Occasional dystonic movements of the R and L arms.  - Reflexes:  2+ and symmetric at the biceps, triceps, brachioradialis, knees, and ankles.  Plantar responses flexor.  - Sensory:  Intact to light touch, pin prick, and joint-position sense throughout. Decreased vibratory sense in the lower extremities bilaterally.   - Coordination:  Finger-nose-finger and heel-knee-shin intact without dysmetria.  Rapid alternating hand movements intact.  - Gait:   Deferred.  LABS:                          13.8   6.96  )-----------( 236      ( 21 Jul 2020 08:53 )             41.8     07-21    137  |  101  |  33<H>  ----------------------------<  130<H>  3.5   |  25  |  1.22    Ca    9.4      21 Jul 2020 08:53    TPro  6.7  /  Alb  3.9  /  TBili  0.5  /  DBili  x   /  AST  25  /  ALT  22  /  AlkPhos  65  07-21          RADIOLOGY & ADDITIONAL STUDIES: *************************************  NEUROLOGY PROGRESS NOTE  **************************************    DENA FARNSWORTH  Male  MRN-[id number]    Subjective:  No acute events overnight. Today patient feels well. Denies any acute changes, no new numbness, tingling, weakness. States that he continues to have abnormal movements of his left and right arm.       VITAL SIGNS:  Vital Signs Last 24 Hrs  T(C): 36.5 (22 Jul 2020 04:47), Max: 36.7 (21 Jul 2020 08:04)  T(F): 97.7 (22 Jul 2020 04:47), Max: 98.1 (21 Jul 2020 10:37)  HR: 56 (22 Jul 2020 04:47) (46 - 70)  BP: 115/74 (22 Jul 2020 04:47) (109/61 - 129/75)  BP(mean): --  RR: 18 (22 Jul 2020 04:47) (16 - 18)  SpO2: 98% (22 Jul 2020 04:47) (95% - 99%)    MEDICATIONS  (STANDING):  amLODIPine   Tablet 5 milliGRAM(s) Oral daily  enoxaparin Injectable 40 milliGRAM(s) SubCutaneous daily  finasteride 5 milliGRAM(s) Oral daily  simvastatin 10 milliGRAM(s) Oral at bedtime    MEDICATIONS  (PRN):  LORazepam   Injectable 2 milliGRAM(s) IV Push once PRN Seizure activity      PHYSICAL EXAMINATION:  General: Well-developed, well nourished, in no acute distress.  Neurologic:  - Mental Status:  Alert, awake, oriented to person, place, and time; Speech is fluent but hoarse with intact naming, repetition, and comprehension. Slowed speech. Intact serial 7s, 2/3 5 minute recall.   - Cranial Nerves II-XII:  VFF, No nystagmus or APD noted, EOMI, saccadic movements on tracking, PERRLA, V1-V3 intact, slight R nasolabial flattening, t/p midline, SCM/trap intact.  - Motor:  Strength is 5/5 throughout.  There is no pronator drift.  Normal muscle bulk. Slight rigidity in the lower extremities bilaterally R>L. Occasional dystonic movements of the R and L arms.  - Reflexes:  2+ and symmetric at the biceps, triceps, brachioradialis, knees, and ankles.  Plantar responses flexor.  - Sensory:  Intact to light touch, pin prick, and joint-position sense throughout. Decreased vibratory sense in the lower extremities bilaterally.   - Coordination:  Finger-nose-finger and heel-knee-shin intact without dysmetria.  Rapid alternating hand movements intact.  - Gait:   Deferred.  LABS:                          13.8   6.96  )-----------( 236      ( 21 Jul 2020 08:53 )             41.8     07-21    137  |  101  |  33<H>  ----------------------------<  130<H>  3.5   |  25  |  1.22    Ca    9.4      21 Jul 2020 08:53    TPro  6.7  /  Alb  3.9  /  TBili  0.5  /  DBili  x   /  AST  25  /  ALT  22  /  AlkPhos  65  07-21          RADIOLOGY & ADDITIONAL STUDIES: *************************************  NEUROLOGY PROGRESS NOTE  **************************************    DENA FARNSWORTH  Male  MRN-[id number]    Subjective:  No acute events overnight. Today patient feels well. Denies any acute changes, no new numbness, tingling, weakness. States that he continues to have abnormal movements of his left and right arm.       VITAL SIGNS:  Vital Signs Last 24 Hrs  T(C): 36.5 (22 Jul 2020 04:47), Max: 36.7 (21 Jul 2020 08:04)  T(F): 97.7 (22 Jul 2020 04:47), Max: 98.1 (21 Jul 2020 10:37)  HR: 56 (22 Jul 2020 04:47) (46 - 70)  BP: 115/74 (22 Jul 2020 04:47) (109/61 - 129/75)  BP(mean): --  RR: 18 (22 Jul 2020 04:47) (16 - 18)  SpO2: 98% (22 Jul 2020 04:47) (95% - 99%)    MEDICATIONS  (STANDING):  amLODIPine   Tablet 5 milliGRAM(s) Oral daily  enoxaparin Injectable 40 milliGRAM(s) SubCutaneous daily  finasteride 5 milliGRAM(s) Oral daily  simvastatin 10 milliGRAM(s) Oral at bedtime    MEDICATIONS  (PRN):  LORazepam   Injectable 2 milliGRAM(s) IV Push once PRN Seizure activity      PHYSICAL EXAMINATION:  General: Well-developed, well nourished, in no acute distress.  Neurologic:  - Mental Status:  Alert, awake, oriented to person, place, and time; Speech is fluent but hoarse with intact naming, repetition, and comprehension. Slowed speech. Intact serial 7s, 2/3 5 minute recall.   - Cranial Nerves II-XII:  VFF, No nystagmus or APD noted, EOMI, saccadic movements on tracking, PERRLA, V1-V3 intact, slight R nasolabial flattening, t/p midline, SCM/trap intact.  - Motor:  Strength is 5/5 throughout.  There is no pronator drift.  Normal muscle bulk. Slight rigidity in the lower extremities bilaterally R>L. Occasional dystonic movements of the R and L arms.  - Reflexes:  2+ and symmetric at the biceps, triceps, brachioradialis, knees, and ankles.  Plantar responses flexor.  - Sensory:  Intact to light touch, pin prick, and joint-position sense throughout. Decreased vibratory sense in the lower extremities bilaterally.   - Coordination:  Finger-nose-finger and heel-knee-shin intact without dysmetria.  Rapid alternating hand movements intact.  - Gait:   Deferred.  LABS:                          13.8   6.96  )-----------( 236      ( 21 Jul 2020 08:53 )             41.8     07-21    137  |  101  |  33<H>  ----------------------------<  130<H>  3.5   |  25  |  1.22    Ca    9.4      21 Jul 2020 08:53    TPro  6.7  /  Alb  3.9  /  TBili  0.5  /  DBili  x   /  AST  25  /  ALT  22  /  AlkPhos  65  07-21          RADIOLOGY & ADDITIONAL STUDIES:      EEG:  EEG Summary:  Abnormal EEG in the awake, drowsy and asleep states.  -  Electrographical seizures, right temporal, with spread to the left   -  Occasional sharp wave, focal, right temporal   - Bitemporal TIRDA  -  Intermittent focal slowing, bilateral temporal   -  Diffuse excess beta activity.      Impression/Clinical Correlate:    Abnormal EEG due to     1.	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	There is epileptogenic focus with structural or functional abnormality in bilateral temporal regions, right greater than left.   Diffuse excess beta activity can be seen in patient treated with sedative medications *************************************  NEUROLOGY PROGRESS NOTE  **************************************    DENA FARNSWORTH  Male  MRN-[id number]    Subjective:  No acute events overnight. Today patient feels well. Denies any acute changes, no new numbness, tingling, weakness. States that he continues to have abnormal movements of his left and right arm.       VITAL SIGNS:  Vital Signs Last 24 Hrs  T(C): 36.5 (22 Jul 2020 04:47), Max: 36.7 (21 Jul 2020 08:04)  T(F): 97.7 (22 Jul 2020 04:47), Max: 98.1 (21 Jul 2020 10:37)  HR: 56 (22 Jul 2020 04:47) (46 - 70)  BP: 115/74 (22 Jul 2020 04:47) (109/61 - 129/75)  BP(mean): --  RR: 18 (22 Jul 2020 04:47) (16 - 18)  SpO2: 98% (22 Jul 2020 04:47) (95% - 99%)    MEDICATIONS  (STANDING):  amLODIPine   Tablet 5 milliGRAM(s) Oral daily  enoxaparin Injectable 40 milliGRAM(s) SubCutaneous daily  finasteride 5 milliGRAM(s) Oral daily  simvastatin 10 milliGRAM(s) Oral at bedtime    MEDICATIONS  (PRN):  LORazepam   Injectable 2 milliGRAM(s) IV Push once PRN Seizure activity      PHYSICAL EXAMINATION:  General: Well-developed, well nourished, in no acute distress.  Neurologic:  - Mental Status:  Alert, awake, oriented to person, place, and time; Speech is fluent but hoarse with intact naming, repetition, and comprehension. Slowed speech. Intact serial 7s, 2/3 5 minute recall. Blunted affect, hypomimic.  - Cranial Nerves II-XII:  VFF, No nystagmus or APD noted, EOMI, hypometric saccadic movements on tracking, PERRLA, V1-V3 intact, slight R nasolabial flattening, t/p midline, SCM/trap intact.  - Motor:  Strength is 5/5 throughout.  There is no pronator drift.  Normal muscle bulk. Slight rigidity in the lower extremities bilaterally R>L. Occasional dystonic movements of the R and L arms.  - Reflexes:  2+ and symmetric at the biceps, triceps, brachioradialis, knees, and ankles.  Plantar responses flexor.  - Sensory:  Intact to light touch, pin prick, and joint-position sense throughout. Decreased vibratory sense in the lower extremities bilaterally.   - Coordination:  Finger-nose-finger and heel-knee-shin intact without dysmetria.  Rapid alternating hand movements intact.  - Gait:   Deferred.  LABS:                          13.8   6.96  )-----------( 236      ( 21 Jul 2020 08:53 )             41.8     07-21    137  |  101  |  33<H>  ----------------------------<  130<H>  3.5   |  25  |  1.22    Ca    9.4      21 Jul 2020 08:53    TPro  6.7  /  Alb  3.9  /  TBili  0.5  /  DBili  x   /  AST  25  /  ALT  22  /  AlkPhos  65  07-21          RADIOLOGY & ADDITIONAL STUDIES:      Outside MRI report noting:   IMPRESSION:  Asymmetric FLAIR hyperintensity in the right limbic lobe involving the hippocampal gyrus. Differential diagnosis includes, but is not limited to, infectious/inflammatory encephalitis, limbic encephalitis and sequela of epilepsy/post ictal edema. Additional mild chronic microvascular changes without acute infarct.    EEG:  EEG Summary:  Abnormal EEG in the awake, drowsy and asleep states.  -  Electrographical seizures, right temporal, with spread to the left   -  Occasional sharp wave, focal, right temporal   - Bitemporal TIRDA  -  Intermittent focal slowing, bilateral temporal   -  Diffuse excess beta activity.      Impression/Clinical Correlate:    Abnormal EEG due to     1.	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	There is epileptogenic focus with structural or functional abnormality in bilateral temporal regions, right greater than left.   Diffuse excess beta activity can be seen in patient treated with sedative medications

## 2020-07-22 NOTE — PROGRESS NOTE ADULT - SUBJECTIVE AND OBJECTIVE BOX
76 y/o RH male with PMHx BPH, HTN, and HLD presents to ED with intermittent left upper extremity spasms and weakness since the first week of June.     Patient diagnosed with limbic encephalitis, presents to IR for non-tunnelled central venous catheter placement for plasmapheresis.      Home medications:  Finasteride 5mg daily  Amlodipine 5mg daily  Simvastatin 10mg nightly  Tizanidine 2mg at bedtime PRN    PAST MEDICAL & SURGICAL HISTORY:  HLD (hyperlipidemia)  BPH (benign prostatic hyperplasia)  No significant past surgical history    Allergies  No Known Allergies    SHx - No smoking, No ETOH, No drug abuse    Review of Systems:  CONSTITUTIONAL:   HEENT:  No visual loss, blurred vision, double vision.  No hearing loss, sneezing, congestion, runny nose or sore throat.  SKIN:  No rash or itching.  CARDIOVASCULAR:  No chest pain, chest pressure or chest discomfort. No palpitations or edema.  RESPIRATORY:  No shortness of breath, cough or sputum.  GASTROINTESTINAL:  No anorexia, nausea, vomiting or diarrhea. No abdominal pain.  GENITOURINARY:  No dysuria. No increased frequency. No retention. No incontinence.  NEUROLOGICAL:  See HPI  MUSCULOSKELETAL:  No muscle, back pain, joint pain or stiffness. (21 Jul 2020 09:54)    NPO status: N/A  Anticoagulation: Lovenox  Antibiotics: None    Allergies:No Known Allergies    PAST MEDICAL & SURGICAL HISTORY:  Hypertension  HLD (hyperlipidemia)  BPH (benign prostatic hyperplasia)  No significant past surgical history    Pertinent labs:                      13.9   6.73  )-----------( 209      ( 22 Jul 2020 06:30 )             42.2   07-22    135  |  102  |  25<H>  ----------------------------<  92  3.8   |  22  |  0.92    Ca    8.7      22 Jul 2020 06:30  Mg     2.1     07-22    TPro  6.7  /  Alb  3.9  /  TBili  0.5  /  DBili  x   /  AST  25  /  ALT  22  /  AlkPhos  65  07-21  PT/INR - ( 22 Jul 2020 08:20 )   PT: 13.5 sec;   INR: 1.15 ratio         PTT - ( 22 Jul 2020 08:20 )  PTT:30.2 sec    Consent: Procedure/risks/ Benefits explained. Informed consent obtained. Pt verbalizes understanding. 76 y/o RH male with PMHx BPH, HTN, and HLD presents to ED with intermittent left upper extremity spasms and weakness since the first week of June.     Patient diagnosed with limbic encephalitis, presents to IR for non-tunnelled ([facility]) catheter placement for plasmapheresis.      Home medications:  Finasteride 5mg daily  Amlodipine 5mg daily  Simvastatin 10mg nightly  Tizanidine 2mg at bedtime PRN    PAST MEDICAL & SURGICAL HISTORY:  HLD (hyperlipidemia)  BPH (benign prostatic hyperplasia)  No significant past surgical history    Allergies  No Known Allergies    SHx - No smoking, No ETOH, No drug abuse    Review of Systems:  CONSTITUTIONAL:   HEENT:  No visual loss, blurred vision, double vision.  No hearing loss, sneezing, congestion, runny nose or sore throat.  SKIN:  No rash or itching.  CARDIOVASCULAR:  No chest pain, chest pressure or chest discomfort. No palpitations or edema.  RESPIRATORY:  No shortness of breath, cough or sputum.  GASTROINTESTINAL:  No anorexia, nausea, vomiting or diarrhea. No abdominal pain.  GENITOURINARY:  No dysuria. No increased frequency. No retention. No incontinence.  NEUROLOGICAL:  See HPI  MUSCULOSKELETAL:  No muscle, back pain, joint pain or stiffness. (21 Jul 2020 09:54)    NPO status: N/A  Anticoagulation: Lovenox  Antibiotics: None    Allergies:No Known Allergies    PAST MEDICAL & SURGICAL HISTORY:  Hypertension  HLD (hyperlipidemia)  BPH (benign prostatic hyperplasia)  No significant past surgical history    Pertinent labs:                      13.9   6.73  )-----------( 209      ( 22 Jul 2020 06:30 )             42.2   07-22    135  |  102  |  25<H>  ----------------------------<  92  3.8   |  22  |  0.92    Ca    8.7      22 Jul 2020 06:30  Mg     2.1     07-22    TPro  6.7  /  Alb  3.9  /  TBili  0.5  /  DBili  x   /  AST  25  /  ALT  22  /  AlkPhos  65  07-21  PT/INR - ( 22 Jul 2020 08:20 )   PT: 13.5 sec;   INR: 1.15 ratio         PTT - ( 22 Jul 2020 08:20 )  PTT:30.2 sec    Consent: Procedure/risks/ Benefits explained. Informed consent obtained. Pt verbalizes understanding.

## 2020-07-22 NOTE — PHYSICAL THERAPY INITIAL EVALUATION ADULT - PERTINENT HX OF CURRENT PROBLEM, REHAB EVAL
74 y/o male with PMHx BPH, HTN, and HLD. Pt p/w intermittent left upper extremity tonic clonic activity with progression to right upper and lower extremities and multiple falls since June, concerning for seizures. Outpatient MRI brain without contrast demonstrates hyperenhancement of right limbic lobe (pending inpatient neuroradiology read). Pt is not encephalopathic at this time. There are no focal neurological deficits on exam.

## 2020-07-22 NOTE — PROGRESS NOTE ADULT - ASSESSMENT
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

## 2020-07-22 NOTE — EEG REPORT - NS EEG TEXT BOX
[facility]  Epilepsy Monitoring Unit Report    [facility]: [address] , [address], [place] [address], Ph#: [phone]  [facility]: [id number] [place] [address], Ph#: [phone]  Office: [address] [place] [address], [facility] [address], [place] [address] Ph#: [phone]    Patient Name: Shawn Alan    Age: -, : -  Patient ID: -, MRN #: [id number], Lucio: [address] [place] [address]  Referring Physician: ER admit           Study Started:16:29 on 2020   Study Ended: 10:24AM on 2020    Study Information:    EEG Recording Technique:  The patient underwent continuous Video-EEG monitoring, using Telemetry System hardware on the XLTek Digital System. EEG and video data were stored on a computer hard drive with important events saved in digital archive files. The material was reviewed by a physician (electroencephalographer / epileptologist) on a daily basis. Vern and seizure detection algorithms were utilized and reviewed. An EEG Technician attended to the patient, and was available throughout daytime work hours.  The epilepsy center neurologist was available in person or on call 24-hours per day.    EEG Placement and Labeling of Electrodes:  The EEG was performed utilizing 20 channel referential EEG connections (coronal over temporal over parasagittal montage) using all standard 10-20 electrode placements with EKG, with additional electrodes placed in the inferior temporal region using the modified 10-10 montage electrode placements for elective admissions, or if deemed necessary. Recording was at a sampling rate of 256 samples per second per channel. Time synchronized digital video recording was done simultaneously with EEG recording. A low light infrared camera was used for low light recording.     History:  EMU study performed bedside  COR: Awake, drowsy  NO HV, NO PHOTIC  74 Y/O Male  P/W: R Limbic enhancement, L Facial twitch  H/O: BPH, HLD, HTN    Home Antiepileptic Medication and Device	  none	    Interpretation:    Starting: Day 2020      Time: 16:29     Duration: 17 H 43 M     Daily EEG Visual Analysis  FINDINGS:  The background was continuous, spontaneously variable and reactive. During wakefulness, the posterior dominant rhythm consisted of symmetric, well-modulated 9 Hz activity, with amplitude to 30 uV, that attenuated to eye opening.  Low amplitude frontal beta was noted in wakefulness.    Background Slowing:  No generalized background slowing was present.    Focal Slowing:     Intermittent bi-temporal focal theta and delta activity present.     Sleep Background:  Drowsiness was characterized by fragmentation, attenuation, and slowing of the background activity.    Sleep was characterized by the presence of vertex waves, symmetric sleep spindles and K-complexes.    Other Non-Epileptiform Findings:  Diffuse excess beta activity.    Interictal Epileptiform Activity:   Occasional sharp waves present over right temporal (max T8 P8 T10).   Rare runs of TIRDA seen independently and synchronously over both temporal regions, right greater than left.    Events:    Few events recorded where patient either had tonic contraction of left or right upper extremities. These events lasted about 2-4 seconds.     Two right temporal elecrographical seizures noted. Onset with rhythmic theta activity evolving to rhythmic delta with possible spread to the left temporal region.  With one of these episodes there was a twitch of the right arm and the other episode had a left arm twitch.  Duration: 3-12 seconds.        ECG:  The heart rate on single channel ECG was predominantly between 60-70 BPM.    AEDs:  none    EEG Summary:  Abnormal EEG in the awake, drowsy and asleep states.  -  Electrographical seizures, right temporal, with spread to the left   -  Occasional sharp wave, focal, right temporal   - Bitemporal TIRDA  -  Intermittent focal slowing, bilateral temporal   -  Diffuse excess beta activity.      Impression/Clinical Correlate:    Abnormal EEG due to     1.	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	There is epileptogenic focus with structural or functional abnormality in bilateral temporal regions, right greater than left.   3.	Diffuse excess beta activity can be seen in patient treated with sedative medications.       ________________________________________      Sonido Menendez MD  Epilepsy Fellow, [facility] [place]      ________________________	  Mick Odom M.D.			    Attending Physician, [facility] [place]

## 2020-07-22 NOTE — OCCUPATIONAL THERAPY INITIAL EVALUATION ADULT - DIAGNOSIS, OT EVAL
Pt demonstrates decreased balance and endurance impacting functional mobility and participation in ADLS/IADLs

## 2020-07-22 NOTE — PROGRESS NOTE ADULT - SUBJECTIVE AND OBJECTIVE BOX
Neurology Progress Note      the patient's symptoms  --- are  unchanged    MEDICATIONS  (STANDING):  amLODIPine   Tablet 5 milliGRAM(s) Oral daily  enoxaparin Injectable 40 milliGRAM(s) SubCutaneous daily  finasteride 5 milliGRAM(s) Oral daily  simvastatin 10 milliGRAM(s) Oral at bedtime    MEDICATIONS  (PRN):  LORazepam   Injectable 2 milliGRAM(s) IV Push once PRN Seizure activity              Vital Signs Last 24 Hrs  T(C): 36.5 (22 Jul 2020 07:29), Max: 36.7 (21 Jul 2020 10:37)  T(F): 97.7 (22 Jul 2020 07:29), Max: 98.1 (21 Jul 2020 10:37)  HR: 51 (22 Jul 2020 07:29) (46 - 65)  BP: 112/68 (22 Jul 2020 07:29) (109/61 - 129/75)  BP(mean): --  RR: 18 (22 Jul 2020 07:29) (16 - 18)  SpO2: 97% (22 Jul 2020 07:29) (95% - 99%)    Physical exam  MENTAL STATUS- Alert, attends, fluent, non-dysarthric, follows commands, oriented, good memory and affect.  CRANIAL NERVES-II Optic - Bilateral - Normal Visual Fields. III-IV-VI EOMI & Pupils - Bilateral - Normal Bilaterally. V Trigeminal - Bilateral - Normal bilateral facial sensation and jaw movement. VII Facial - Normal bilateral facial movement. VIII Acoustic - Bilateral - Hearing normal to voice bilaterally. IX Glossopharyngeal / X Vagus - Normal palate elevates symmetrically. XI Accessory - Normal Bilaterally. XII Hypoglossal - Tongue protrudes midline and moves symmetrically.  MOTOR-Bulk and Contour - Normal. Tone - Normal tone, no abnormal movements. Strength - 5/5 normal muscle strength - Strength full throughout.  SENSORY-Normal - LT, DSS, Vibration.  REFLEXES- DTR's 2+ throughout.  COORDINATION-Normal FFM, SAMANTHA, FNF - bilaterally.  GAIT-NA    CBC Full  -  ( 22 Jul 2020 06:30 )  WBC Count : 6.73 K/uL  RBC Count : 4.72 M/uL  Hemoglobin : 13.9 g/dL  Hematocrit : 42.2 %  Platelet Count - Automated : 209 K/uL  Mean Cell Volume : 89.4 fl  Mean Cell Hemoglobin : 29.4 pg  Mean Cell Hemoglobin Concentration : 32.9 gm/dL  Auto Neutrophil # : 4.53 K/uL  Auto Lymphocyte # : 1.37 K/uL  Auto Monocyte # : 0.66 K/uL  Auto Eosinophil # : 0.10 K/uL  Auto Basophil # : 0.05 K/uL  Auto Neutrophil % : 67.3 %  Auto Lymphocyte % : 20.4 %  Auto Monocyte % : 9.8 %  Auto Eosinophil % : 1.5 %  Auto Basophil % : 0.7 %      07-22    135  |  102  |  25<H>  ----------------------------<  92  3.8   |  22  |  0.92    Ca    8.7      22 Jul 2020 06:30  Mg     2.1     07-22    TPro  6.7  /  Alb  3.9  /  TBili  0.5  /  DBili  x   /  AST  25  /  ALT  22  /  AlkPhos  65  07-21    LIVER FUNCTIONS - ( 21 Jul 2020 08:53 )  Alb: 3.9 g/dL / Pro: 6.7 g/dL / ALK PHOS: 65 U/L / ALT: 22 U/L / AST: 25 U/L / GGT: x

## 2020-07-22 NOTE — PROGRESS NOTE ADULT - ASSESSMENT
76 y/o male with PMHx BPH, HTN, and HLD presents to ED with intermittent left upper extremity tonic clonic activity with progression to right upper and lower extremities and multiple falls since June, concerning for seizures. Outpatient MRI brain without contrast demonstrates hyperenhancement of right limbic lobe. Patient is not encephalopathic at this time.     Impression: Bilateral upper extremity tonic clonic activity with involvement of right lower extremity secondary to possible focal seizure due to unknown etiology vs faciobrachial dystonic seizure. Differentials remain broad and include autoimmune encephalitis including anti-LGl1 encephalitis vs paraneoplastic encephalitis vs. other inflammatory encephalitis vs. infectious encephalitis vs. less likely focal dystonia.    Outside MRI report noting:   IMPRESSION:  Asymmetric FLAIR hyperintensity in the right limbic lobe involving the hippocampal gyrus. Differential diagnosis includes, but is not limited to, infectious/inflammatory encephalitis, limbic encephalitis and sequela of epilepsy/post ictal edema. Additional mild chronic microvascular changes without acute infarct.    Plan  [] Continuous VEEG  [] Follow up serum autoimmune workup  [] Report of outside MRI as above, pending read by inpatient neurology  [] Repeat MRI brain with contrast   [] Consider LP to send for autoimmune/paraneoplatic/infectious panels depending on above results  [] Consider CT chest/abdomen/pelvis with contrast for paraneoplastic workup  [] Consider glucocorticoids, IVIG if event captured or typical for LGI1  [] STAT CT head for acute change in mental status  [] Monitor for signs of infection  [] Seizure, fall and aspiration precautions.  Avoid sleep deprivation.   [] Patient's emergency contact is his close friend, Belinda [phone].  [] IV  lorazepam 2mg IV prn for seizure activity lasting >3-5mins, >2x/hr, >3x/day, or if GTC , repeat after 1 minute (max dose 0.1 mg/kg)      Case discussed with Dr. Justice. 76 y/o male with PMHx BPH, HTN, and HLD presents to ED with intermittent left upper extremity tonic clonic activity with progression to right upper and lower extremities and multiple falls since June, concerning for seizures. Outpatient MRI brain without contrast demonstrates hyperenhancement of right limbic lobe. Patient is not encephalopathic at this time.     Impression: Bilateral upper extremity tonic clonic activity with involvement of right lower extremity secondary to possible focal seizure due to unknown etiology vs faciobrachial dystonic seizure. Differentials remain broad and include autoimmune encephalitis including anti-LGl1 encephalitis vs paraneoplastic encephalitis vs. other inflammatory encephalitis vs. infectious encephalitis vs. less likely focal dystonia.    Outside MRI report noting:   IMPRESSION:  Asymmetric FLAIR hyperintensity in the right limbic lobe involving the hippocampal gyrus. Differential diagnosis includes, but is not limited to, infectious/inflammatory encephalitis, limbic encephalitis and sequela of epilepsy/post ictal edema. Additional mild chronic microvascular changes without acute infarct.    Plan  [x] Continuous VEEG  [] cardiology consultation for bradycardia  [] Follow up serum autoimmune workup  [] Report of outside MRI as above, pending read by inpatient neurology  [] Repeat MRI brain with contrast   [] Consider LP to send for autoimmune/paraneoplatic/infectious panels depending on above results  [] Consider CT chest/abdomen/pelvis with contrast for paraneoplastic workup  [] Consider glucocorticoids, IVIG if event captured or typical for LGI1  [] STAT CT head for acute change in mental status  [] Seizure, fall and aspiration precautions.  Avoid sleep deprivation.   [] Patient's emergency contact is his close friend, Belinda [phone].  [] IV  lorazepam 2mg IV prn for seizure activity lasting >3-5mins, >2x/hr, >3x/day, or if GTC , repeat after 1 minute (max dose 0.1 mg/kg)      Case discussed with Dr. Justice. 76 y/o male with PMHx BPH, HTN, and HLD presents to ED with intermittent left upper extremity tonic clonic activity with progression to right upper and lower extremities and multiple falls since June, concerning for seizures. Outpatient MRI brain without contrast demonstrates hyperenhancement of right limbic lobe. Patient is not encephalopathic at this time.     Impression: Bilateral upper extremity tonic clonic activity with involvement of right lower extremity secondary to possible focal seizure due to unknown etiology vs faciobrachial dystonic seizure. Differentials remain broad and include autoimmune encephalitis including anti-LGl1 encephalitis vs paraneoplastic encephalitis vs. other inflammatory encephalitis vs. infectious encephalitis vs. less likely focal dystonia.    Outside MRI report noting:   IMPRESSION:  Asymmetric FLAIR hyperintensity in the right limbic lobe involving the hippocampal gyrus. Differential diagnosis includes, but is not limited to, infectious/inflammatory encephalitis, limbic encephalitis and sequela of epilepsy/post ictal edema. Additional mild chronic microvascular changes without acute infarct.    Plan  [x] can stop continuous VEEG, pending report  [x] cardiology consultation for bradycardia, recs appreciated, check TTE and thyroid panel  [] Follow up serum autoimmune workup  [x] Report of outside MRI as above, read by in house neuroradiology noting enhancement in R limbic area with broad differential of autoimmune vs infectious, concerning for limbic encephalitis. C-spine MRI with chronic degenerative changes, no acute lesions noted  [] Repeat MRI brain with contrast   [] LP to send for autoimmune/paraneoplatic/infectious panel  [] CT chest/abdomen/pelvis with contrast for paraneoplastic workup  [] PLEX therapy after workup, will need shiley placed by IR prior  [] Seizure, fall and aspiration precautions.  Avoid sleep deprivation.   [] Patient's emergency contact is his close friend, Belinda [phone].  [] IV  lorazepam 2mg IV prn for seizure activity lasting >3-5mins, >2x/hr, >3x/day, or if GTC , repeat after 1 minute (max dose 0.1 mg/kg)      Case discussed with Dr. Justice. 74 y/o male with PMHx BPH, HTN, and HLD presents to ED with intermittent left upper extremity tonic clonic activity with progression to right upper and lower extremities and multiple falls since June, concerning for seizures. Outpatient MRI brain without contrast demonstrates hyperenhancement of right limbic lobe. Patient is not encephalopathic at this time.     Impression: Bilateral upper extremity tonic clonic activity with involvement of right lower extremity secondary to possible focal seizure due to unknown etiology vs faciobrachial dystonic seizure. Differentials remain broad and include autoimmune encephalitis including anti-LGl1 encephalitis vs paraneoplastic encephalitis vs. other inflammatory encephalitis vs. infectious encephalitis vs. less likely focal dystonia.    Outside MRI report noting:   IMPRESSION:  Asymmetric FLAIR hyperintensity in the right limbic lobe involving the hippocampal gyrus. Differential diagnosis includes, but is not limited to, infectious/inflammatory encephalitis, limbic encephalitis and sequela of epilepsy/post ictal edema. Additional mild chronic microvascular changes without acute infarct.    Plan  [x] can stop continuous VEEG, pending report  [] Repeat MRI brain with contrast   [x] Report of outside MRI as above, read by in house neuroradiology noting enhancement in R limbic area with broad differential of autoimmune vs infectious, concerning for limbic encephalitis. C-spine MRI with chronic degenerative changes, no acute lesions noted  [] LP to send for autoimmune/paraneoplatic/infectious panel  [] Follow up serum autoimmune workup  [] CT chest/abdomen/pelvis with contrast for paraneoplastic workup today  [] PLEX therapy after workup, will need shiley placed by IR, to be placed today  [x] cardiology consultation for bradycardia, recs appreciated, check TTE and thyroid panel  [] low B12, obtain methylmalonic, homocysteine, anti-parietal cell, intrinsic factor. Start IM B12 tomorrow x 7 days  [] Seizure, fall and aspiration precautions.  Avoid sleep deprivation.   [] Patient's emergency contact is his close friend, Belinda [phone].  [] IV  lorazepam 2mg IV prn for seizure activity lasting >3-5mins, >2x/hr, >3x/day, or if GTC , repeat after 1 minute (max dose 0.1 mg/kg)      DVT PPx: held today pending LP, restart Lovenox 40mg subq daily tomorrow after LP    Case discussed with Dr. Justice, to be transferred to floor. 74 y/o male with PMHx BPH, HTN, and HLD presents to ED with intermittent left upper extremity tonic clonic activity with progression to right upper and lower extremities and multiple falls since June, concerning for seizures. Outpatient MRI brain without contrast demonstrates hyperenhancement of right limbic lobe. Patient is not encephalopathic at this time.     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 Differentials also includes autoimmune encephalitis paraneoplastic encephalitis vs. other inflammatory encephalitis vs. infectious encephalitis vs. less likely focal dystonia.    Plan  [x] can stop continuous VEEG, report as above  [] Repeat MRI brain with contrast   [x] Report of outside MRI as above, read by in house neuroradiology noting enhancement in R limbic area with broad differential of autoimmune vs infectious, concerning for limbic encephalitis. C-spine MRI with chronic degenerative changes, no acute lesions noted  [] LP to send for autoimmune/paraneoplatic/infectious panel  [] Follow up serum autoimmune workup  [] CT chest/abdomen/pelvis with contrast for paraneoplastic workup today  [] PLEX therapy after workup, will need shiley placed by IR, to be placed today  [x] cardiology consultation for bradycardia, recs appreciated, check TTE and thyroid panel  [] low B12, obtain methylmalonic, homocysteine, anti-parietal cell, intrinsic factor. Start IM B12 tomorrow x 7 days  [] Seizure, fall and aspiration precautions.  Avoid sleep deprivation.   [] Patient's emergency contact is his close friend, Belinda [phone].  [] IV  lorazepam 2mg IV prn for seizure activity lasting >3-5mins, >2x/hr, >3x/day, or if GTC , repeat after 1 minute (max dose 0.1 mg/kg)      DVT PPx: held today pending LP, restart Lovenox 40mg subq daily tomorrow after LP    Case discussed with Dr. Justice. No

## 2020-07-22 NOTE — PROCEDURE NOTE - ADDITIONAL PROCEDURE DETAILS
S/P RIGHT IJ TEMPORARY CV CATHETER PLACEMENT, PT TOLERATED THE PROCEDURE WELL. HEMOSTASIS SECURE AND VSS.  DRESSING APPLIED TO THE SITE IS C/D/I.  CATHETER TIP WAS CONFIRMED IN SVC UNDER FLUOROSCOPY AND THE RIJ TEMP CV CATHETER CAN BE USED IMMEDIATELY.

## 2020-07-22 NOTE — CONSULT NOTE ADULT - SUBJECTIVE AND OBJECTIVE BOX
CHIEF COMPLAINT:    HISTORY OF PRESENT ILLNESS: 75y Male without  a sig history  w recent c/o episodes of stiffening then jerking of L arm sometimes followed by R side lasting seconds,  ? 5-10x/day, he denies HA, visual memory cognitive sx, lateralized sx in btw events  has been bradycardic and cardiology consulted for evaluation.     PAST MEDICAL & SURGICAL HISTORY:  Hypertension  HLD (hyperlipidemia)  BPH (benign prostatic hyperplasia)  No significant past surgical history          MEDICATIONS:  amLODIPine   Tablet 5 milliGRAM(s) Oral daily  enoxaparin Injectable 40 milliGRAM(s) SubCutaneous daily        LORazepam   Injectable 2 milliGRAM(s) IV Push once PRN      finasteride 5 milliGRAM(s) Oral daily  simvastatin 10 milliGRAM(s) Oral at bedtime        FAMILY HISTORY:      SOCIAL HISTORY:    [ ] Non-smoker  [ ] Smoker  [ ] Alcohol    Allergies    No Known Allergies    Intolerances    	    REVIEW OF SYSTEMS:  CONSTITUTIONAL: No fever, weight loss, or fatigue  EYES: No eye pain, visual disturbances, or discharge  ENMT:  No difficulty hearing, tinnitus, vertigo; No sinus or throat pain  NECK: No pain or stiffness  RESPIRATORY: No cough, wheezing, chills or hemoptysis; No Shortness of Breath  CARDIOVASCULAR: No chest pain, palpitations, passing out, dizziness, or leg swelling  GASTROINTESTINAL: No abdominal or epigastric pain. No nausea, vomiting, or hematemesis; No diarrhea or constipation. No melena or hematochezia.  GENITOURINARY: No dysuria, frequency, hematuria, or incontinence  NEUROLOGICAL: No headaches, memory loss, loss of strength, numbness, or tremors  SKIN: No itching, burning, rashes, or lesions   LYMPH Nodes: No enlarged glands  ENDOCRINE: No heat or cold intolerance; No hair loss  MUSCULOSKELETAL: No joint pain or swelling; No muscle, back, or extremity pain  PSYCHIATRIC: No depression, anxiety, mood swings, or difficulty sleeping  HEME/LYMPH: No easy bruising, or bleeding gums  ALLERY AND IMMUNOLOGIC: No hives or eczema	    [ ] All others negative	  [ ] Unable to obtain    PHYSICAL EXAM:  T(C): 36.5 (07-22-20 @ 07:29), Max: 36.7 (07-21-20 @ 10:37)  HR: 51 (07-22-20 @ 07:29) (46 - 65)  BP: 112/68 (07-22-20 @ 07:29) (109/61 - 129/75)  RR: 18 (07-22-20 @ 07:29) (16 - 18)  SpO2: 97% (07-22-20 @ 07:29) (95% - 99%)  Wt(kg): --  I&O's Summary    21 Jul 2020 07:01  -  22 Jul 2020 07:00  --------------------------------------------------------  IN: 350 mL / OUT: 0 mL / NET: 350 mL        Appearance: Normal	  HEENT:   Normal oral mucosa, PERRL, EOMI	  Lymphatic: No lymphadenopathy  Cardiovascular: Normal S1 S2, No JVD, No murmurs, No edema  Respiratory: Lungs clear to auscultation	  Psychiatry: A & O x 3, Mood & affect appropriate  Gastrointestinal:  Soft, Non-tender, + BS	  Skin: No rashes, No ecchymoses, No cyanosis	  Extremities: Normal range of motion, No clubbing, cyanosis or edema  Vascular: Peripheral pulses palpable 2+ bilaterally  Neurologic:  EYES- non-icteric disks obscured  MENTAL STATUS- Alert, attends, fluent, non-dysarthric, follows commands, oriented, good memory and affect.?sluggish  CRANIAL NERVES-II Optic - Bilateral - Normal Visual Fields. III-IV-VI EOMI & Pupils - Bilateral - Normal Bilaterally. V Trigeminal - Bilateral - Normal bilateral facial sensation and jaw movement. VII Facial - Normal bilateral facial movement. VIII Acoustic - Bilateral - Hearing normal to voice bilaterally. IX Glossopharyngeal / X Vagus - Normal palate elevates symmetrically. XI Accessory - Normal Bilaterally. XII Hypoglossal - Tongue protrudes midline and moves symmetrically.  MOTOR-Bulk and Contour - Normal. Tone - Normal tone, no abnormal movements. Strength - 5/5 normal muscle strength - Strength full throughout.  SENSORY-Normal - LT, DSS, Vibration.  REFLEXES- DTR's 2+ throughout.  COORDINATION-Normal FFM, SAMANTHA, FNF - bilaterally.  GAIT-NA      TELEMETRY: 	    ECG:  	  RADIOLOGY:    OTHER: 	  	  LABS:	 	    CARDIAC MARKERS:                                  13.9   6.73  )-----------( 209      ( 22 Jul 2020 06:30 )             42.2     07-22    135  |  102  |  25<H>  ----------------------------<  92  3.8   |  22  |  0.92    Ca    8.7      22 Jul 2020 06:30  Mg     2.1     07-22    TPro  6.7  /  Alb  3.9  /  TBili  0.5  /  DBili  x   /  AST  25  /  ALT  22  /  AlkPhos  65  07-21    proBNP:   Lipid Profile:   HgA1c:   TSH: CHIEF COMPLAINT:    HISTORY OF PRESENT ILLNESS: 75y Male without  a sig history  w recent c/o episodes of stiffening then jerking of L arm sometimes followed by R side lasting seconds,  ? 5-10x/day, he denies HA, visual memory cognitive sx, lateralized sx in btw events  has been bradycardic and cardiology consulted for evaluation.   Denies chest pain, dizziness, palpitations or any cardiac history.       PAST MEDICAL & SURGICAL HISTORY:  Hypertension  HLD (hyperlipidemia)  BPH (benign prostatic hyperplasia)  No significant past surgical history          MEDICATIONS:  amLODIPine   Tablet 5 milliGRAM(s) Oral daily  enoxaparin Injectable 40 milliGRAM(s) SubCutaneous daily        LORazepam   Injectable 2 milliGRAM(s) IV Push once PRN      finasteride 5 milliGRAM(s) Oral daily  simvastatin 10 milliGRAM(s) Oral at bedtime        FAMILY HISTORY:      SOCIAL HISTORY:    [ ] Non-smoker  [ ] Smoker  [ ] Alcohol    Allergies    No Known Allergies    Intolerances    	    REVIEW OF SYSTEMS:  CONSTITUTIONAL: No fever, weight loss,+ fatigue  EYES: No eye pain, visual disturbances, or discharge  ENMT:  No difficulty hearing, tinnitus, vertigo; No sinus or throat pain  NECK: No pain or stiffness  RESPIRATORY: No cough, wheezing, chills or hemoptysis; No Shortness of Breath  CARDIOVASCULAR: No chest pain, palpitations, passing out, dizziness, or leg swelling  GASTROINTESTINAL: No abdominal or epigastric pain. No nausea, vomiting, or hematemesis; No diarrhea or constipation. No melena or hematochezia.  GENITOURINARY: No dysuria, frequency, hematuria, or incontinence  NEUROLOGICAL: No headaches, memory loss, loss of strength, numbness, or tremors  SKIN: No itching, burning, rashes, or lesions   LYMPH Nodes: No enlarged glands  ENDOCRINE: No heat or cold intolerance; No hair loss  MUSCULOSKELETAL: No joint pain or swelling; No muscle, back, or extremity pain  PSYCHIATRIC: No depression, anxiety, mood swings, or difficulty sleeping  HEME/LYMPH: No easy bruising, or bleeding gums  ALLERY AND IMMUNOLOGIC: No hives or eczema	    [ ] All others negative	  [ ] Unable to obtain    PHYSICAL EXAM:  T(C): 36.5 (07-22-20 @ 07:29), Max: 36.7 (07-21-20 @ 10:37)  HR: 51 (07-22-20 @ 07:29) (46 - 65)  BP: 112/68 (07-22-20 @ 07:29) (109/61 - 129/75)  RR: 18 (07-22-20 @ 07:29) (16 - 18)  SpO2: 97% (07-22-20 @ 07:29) (95% - 99%)  Wt(kg): --  I&O's Summary    21 Jul 2020 07:01  -  22 Jul 2020 07:00  --------------------------------------------------------  IN: 350 mL / OUT: 0 mL / NET: 350 mL        Appearance: NAD EEG in progress 	  HEENT:   Normal oral mucosa, PERRL, EOMI	  Lymphatic: No lymphadenopathy  Cardiovascular: Normal S1 S2, No JVD, No murmurs, No edema  Respiratory: Lungs clear to auscultation	  Psychiatry: A & O x 3, Mood & affect appropriate  Gastrointestinal:  Soft, Non-tender, + BS	  Skin: No rashes, No ecchymoses, No cyanosis	  Extremities: Normal range of motion, No clubbing, cyanosis or edema  Vascular: Peripheral pulses palpable 2+ bilaterally  Neurologic:  EYES- non-icteric disks obscured  MENTAL STATUS- Alert, attends, fluent, non-dysarthric, follows commands, oriented, good memory and affect.?sluggish  CRANIAL NERVES-II Optic - Bilateral - Normal Visual Fields. III-IV-VI EOMI & Pupils - Bilateral - Normal Bilaterally. V Trigeminal - Bilateral - Normal bilateral facial sensation and jaw movement. VII Facial - Normal bilateral facial movement. VIII Acoustic - Bilateral - Hearing normal to voice bilaterally. IX Glossopharyngeal / X Vagus - Normal palate elevates symmetrically. XI Accessory - Normal Bilaterally. XII Hypoglossal - Tongue protrudes midline and moves symmetrically.  MOTOR-Bulk and Contour - Normal. Tone - Normal tone, no abnormal movements. Strength - 5/5 normal muscle strength - Strength full throughout.  SENSORY-Normal - LT, DSS, Vibration.  REFLEXES- DTR's 2+ throughout.  COORDINATION-Normal FFM, SAMANTHA, FNF - bilaterally.  GAIT-NA      TELEMETRY: 	    ECG:  	Sinus bradycardia @ 52, no acute ischemic stt changes   RADIOLOGY:    OTHER: 	  	  LABS:	 	    CARDIAC MARKERS:    Sedimentation Rate, Erythrocyte (07.21.20 @ 13:52)    Sedimentation Rate, Erythrocyte: 48 mm/hr                                  13.9   6.73  )-----------( 209      ( 22 Jul 2020 06:30 )             42.2     07-22    135  |  102  |  25<H>  ----------------------------<  92  3.8   |  22  |  0.92    Ca    8.7      22 Jul 2020 06:30  Mg     2.1     07-22    TPro  6.7  /  Alb  3.9  /  TBili  0.5  /  DBili  x   /  AST  25  /  ALT  22  /  AlkPhos  65  07-21    proBNP:   Lipid Profile:   HgA1c:   TSH:

## 2020-07-22 NOTE — CONSULT NOTE ADULT - ASSESSMENT
75y Male without  a sig history  w recent c/o episodes of stiffening then jerking of L arm sometimes followed by R side lasting seconds,  ? 5-10x/day, he denies HA, visual memory cognitive sx, lateralized sx in btw events  has been bradycardic and cardiology consulted for evaluation.

## 2020-07-22 NOTE — CONSULT NOTE ADULT - ASSESSMENT
76 yo male PMHx BPH, HTN, and HLD presents to ED with intermittent left upper extremity tonic clonic activity with progression to right upper and lower extremities and multiple falls since June, concerning for seizures. Outpatient MRI brain without contrast demonstrates hyperenhancement of right limbic lobe. Medicine consult for co management.    # Bilateral upper extremity tonic clonic activity secondary to possible focal seizure vs encephalitis  # HTN  # HLD  # BPH    Appreciate neuro care  Continuous VEEG  Follow up serum autoimmune workup  MRI brain with contrast   LP  CT chest/abdomen/pelvis with contrast for paraneoplastic workup  BP stable, cont norvasc  cont proscar  cont statin    PCP Dr. Tevin Ovalle - will update    Thank you for this consult. Please call [facility] with questions [phone].

## 2020-07-22 NOTE — OCCUPATIONAL THERAPY INITIAL EVALUATION ADULT - ADDITIONAL COMMENTS
74 y/o male with PMHx BPH, HTN, and HLD presents to ED with intermittent left upper extremity tonic clonic activity with progression to right upper and lower extremities and multiple falls since June, concerning for seizures. Outpatient MRI brain without contrast demonstrates hyperenhancement of right limbic lobe. Patient is not encephalopathic at this time. Asymmetric FLAIR hyperintensity in the right limbic lobe involving the hippocampal gyrus. Differential diagnosis includes, but is not limited to, infectious/inflammatory encephalitis, limbic encephalitis and sequela of epilepsy/post ictal edema. Additional mild chronic microvascular changes without acute infarct.

## 2020-07-22 NOTE — PHYSICAL THERAPY INITIAL EVALUATION ADULT - ADDITIONAL COMMENTS
Prior to admission pt reports being independent of all ADL's & functional mobility without AD. Pt resides in apt alone, 3 steps to enter, (+) b/l rail, (+) elevator up to floor. (+) glasses for distance and reading. Pt has tub. (+) driving

## 2020-07-22 NOTE — CONSULT NOTE ADULT - SUBJECTIVE AND OBJECTIVE BOX
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    Allergies:   Medications (Abx/Cardiac/Anticoagulation/Blood Products)  amLODIPine   Tablet: 5 milliGRAM(s) Oral (07-22 @ 05:07)  enoxaparin Injectable: 40 milliGRAM(s) SubCutaneous (07-21 @ 22:23)    Data:    T(C): 36.5  HR: 51  BP: 112/68  RR: 18  SpO2: 97%    -WBC 6.73 / HgB 13.9 / Hct 42.2 / Plt 209  -Na 135 / Cl 102 / BUN 25 / Glucose 92  -K 3.8 / CO2 22 / Cr 0.92  -ALT -- / Alk Phos -- / T.Bili --  -INR1.15      Plan:   -Shiley placement   -Please place order for IR Procedure, approving attending Dr. Beard  -NPO  -hold therpaeutic and prophylactic anticoagulants  -maintain active type and screen x 2

## 2020-07-22 NOTE — CONSULT NOTE ADULT - SUBJECTIVE AND OBJECTIVE BOX
Patient is a 75y old  Male who presents with a chief complaint of limbic encephalitis (22 Jul 2020 10:40)      HPI:  74 y/o RH male with PMHx BPH, HTN, and HLD presents to ED with intermittent left upper extremity spasms and weakness since the first week of June. Patient reports that symptoms have progressed to include his right upper and lower extremity since late June. He states that in the beginning of June, he had pain below his left ear. A couple weeks later, patient started to have stiffness of his left upper extremity that felt like spasms and was accompanied by shaking. He then began to have involvement of his right side with involvement of his right upper and right lower extremity. The shaking is usually followed by weakness, which lasts a few minutes before he regains full function. The weakness in his arms has led him to break dishes multiple times. Patient states that holding a weighted object may precipitate the episodes. He does not know how often the episodes occur, but says they are very frequent when he does his daily activities. He cannot recall exactly when the last one was. The patient has had multiple falls since June, most recently a couple weeks ago that he attributes to loss of balance. He denies syncope, loss of consciousness, tongue biting, and urinary incontinence. Patient denies previous history of a neurological disorder, including seizures. Patient does not ambulate with mechanical assistance at baseline. He lives alone and says that the episodes have not been witnessed.  Patient had an MRI brain without contrast yesterday after going to a private neurologist. He states that his friend Belinda ([phone]) will bring in CD with images to be reviewed.    Patient denies weight loss, fevers, chills, abdominal pain, cough, chest pain, nausea, vomiting, numbness, tingling, headaches, vision changes, sick contacts, confusion, memory difficulties, change in behavior/personality, and sleep disturbances.    Home medications:  Finasteride 5mg daily  Amlodipine 5mg daily  Simvastatin 10mg nightly  Tizanidine 2mg at bedtime PRN    PAST MEDICAL & SURGICAL HISTORY:  HLD (hyperlipidemia)  BPH (benign prostatic hyperplasia)  No significant past surgical history    Allergies  No Known Allergies    SHx - No smoking, No ETOH, No drug abuse    Review of Systems:  CONSTITUTIONAL:   HEENT:  No visual loss, blurred vision, double vision.  No hearing loss, sneezing, congestion, runny nose or sore throat.  SKIN:  No rash or itching.  CARDIOVASCULAR:  No chest pain, chest pressure or chest discomfort. No palpitations or edema.  RESPIRATORY:  No shortness of breath, cough or sputum.  GASTROINTESTINAL:  No anorexia, nausea, vomiting or diarrhea. No abdominal pain.  GENITOURINARY:  No dysuria. No increased frequency. No retention. No incontinence.  NEUROLOGICAL:  See HPI  MUSCULOSKELETAL:  No muscle, back pain, joint pain or stiffness. (21 Jul 2020 09:54)  	    SUBJECTIVE / OVERNIGHT EVENTS:        Vital Signs Last 24 Hrs  T(C): 36.5 (22 Jul 2020 07:29), Max: 36.6 (21 Jul 2020 20:54)  T(F): 97.7 (22 Jul 2020 07:29), Max: 97.9 (21 Jul 2020 20:54)  HR: 66 (22 Jul 2020 10:40) (46 - 66)  BP: 112/73 (22 Jul 2020 10:40) (109/61 - 129/75)  BP(mean): --  RR: 18 (22 Jul 2020 07:29) (16 - 18)  SpO2: 99% (22 Jul 2020 10:40) (95% - 99%)  I&O's Summary    21 Jul 2020 07:01  -  22 Jul 2020 07:00  --------------------------------------------------------  IN: 350 mL / OUT: 0 mL / NET: 350 mL        PHYSICAL EXAM:  GENERAL: NAD, Comfortable, EEG ongoing  HEAD:  Atraumatic, Normocephalic  CHEST/LUNG: Clear to auscultation bilaterally; No wheeze  HEART: Regular rate and rhythm; No murmurs, rubs, or gallops  ABDOMEN: Soft, Nontender, Nondistended; Bowel sounds present  EXTREMITIES:  2+ Peripheral Pulses, No clubbing, cyanosis, or edema  SKIN: No rashes or lesions    LABS:                        13.9   6.73  )-----------( 209      ( 22 Jul 2020 06:30 )             42.2     07-22    135  |  102  |  25<H>  ----------------------------<  92  3.8   |  22  |  0.92    Ca    8.7      22 Jul 2020 06:30  Mg     2.1     07-22    TPro  6.7  /  Alb  3.9  /  TBili  0.5  /  DBili  x   /  AST  25  /  ALT  22  /  AlkPhos  65  07-21    PT/INR - ( 22 Jul 2020 08:20 )   PT: 13.5 sec;   INR: 1.15 ratio         PTT - ( 22 Jul 2020 08:20 )  PTT:30.2 sec  CAPILLARY BLOOD GLUCOSE                RADIOLOGY & ADDITIONAL TESTS:    Imaging Personally Reviewed:  [x] YES  [ ] NO    Consultant(s) Notes Reviewed:  [x] YES  [ ] NO      MEDICATIONS  (STANDING):  amLODIPine   Tablet 5 milliGRAM(s) Oral daily  enoxaparin Injectable 40 milliGRAM(s) SubCutaneous daily  finasteride 5 milliGRAM(s) Oral daily  simvastatin 10 milliGRAM(s) Oral at bedtime  sodium chloride 0.9%. 1000 milliLiter(s) (85 mL/Hr) IV Continuous <Continuous>    MEDICATIONS  (PRN):  LORazepam   Injectable 2 milliGRAM(s) IV Push once PRN Seizure activity  polyethylene glycol 3350 17 Gram(s) Oral daily PRN Constipation      Care Discussed with Consultants/Other Providers [x] YES  [ ] NO    HEALTH ISSUES - PROBLEM Dx:  Limbic encephalitis: Limbic encephalitis  Bradycardia: Bradycardia

## 2020-07-22 NOTE — OCCUPATIONAL THERAPY INITIAL EVALUATION ADULT - PERTINENT HX OF CURRENT PROBLEM, REHAB EVAL
74 y/o RH male with PMHx BPH, HTN, and HLD presents to ED with intermittent left upper extremity spasms and weakness since the first week of June. Patient reports that symptoms have progressed to include his right upper and lower extremity since late June.

## 2020-07-22 NOTE — OCCUPATIONAL THERAPY INITIAL EVALUATION ADULT - BALANCE TRAINING, PT EVAL
GOAL: Pt will increase standing balance to good in order to increase safety during functional ambulation and transfers in 4 weeks

## 2020-07-23 ENCOUNTER — RESULT REVIEW (OUTPATIENT)
Age: 75
End: 2020-07-23

## 2020-07-23 LAB
% ALBUMIN: 55.5 % — SIGNIFICANT CHANGE UP
% ALPHA 1: 5 % — SIGNIFICANT CHANGE UP
% ALPHA 2: 12.4 % — SIGNIFICANT CHANGE UP
% BETA: 11.3 % — SIGNIFICANT CHANGE UP
% GAMMA: 15.8 % — SIGNIFICANT CHANGE UP
ACE SERPL-CCNC: 11 U/L — LOW (ref 14–82)
ALBUMIN FLD-MCNC: <0.3 G/DL — SIGNIFICANT CHANGE UP
ALBUMIN SERPL ELPH-MCNC: 3.3 G/DL — LOW (ref 3.6–5.5)
ALBUMIN/GLOB SERPL ELPH: 1.2 RATIO — SIGNIFICANT CHANGE UP
ALPHA1 GLOB SERPL ELPH-MCNC: 0.3 G/DL — SIGNIFICANT CHANGE UP (ref 0.1–0.4)
ALPHA2 GLOB SERPL ELPH-MCNC: 0.7 G/DL — SIGNIFICANT CHANGE UP (ref 0.5–1)
ANION GAP SERPL CALC-SCNC: 10 MMOL/L — SIGNIFICANT CHANGE UP (ref 5–17)
APPEARANCE CSF: CLEAR — SIGNIFICANT CHANGE UP
B-GLOBULIN SERPL ELPH-MCNC: 0.7 G/DL — SIGNIFICANT CHANGE UP (ref 0.5–1)
BLD GP AB SCN SERPL QL: NEGATIVE — SIGNIFICANT CHANGE UP
BLD GP AB SCN SERPL QL: NEGATIVE — SIGNIFICANT CHANGE UP
BUN SERPL-MCNC: 18 MG/DL — SIGNIFICANT CHANGE UP (ref 7–23)
CA-I BLD-SCNC: 1.16 MMOL/L — SIGNIFICANT CHANGE UP (ref 1.12–1.3)
CALCIUM SERPL-MCNC: 8.6 MG/DL — SIGNIFICANT CHANGE UP (ref 8.4–10.5)
CHLORIDE SERPL-SCNC: 99 MMOL/L — SIGNIFICANT CHANGE UP (ref 96–108)
CO2 SERPL-SCNC: 23 MMOL/L — SIGNIFICANT CHANGE UP (ref 22–31)
COLOR CSF: SIGNIFICANT CHANGE UP
CREAT SERPL-MCNC: 1 MG/DL — SIGNIFICANT CHANGE UP (ref 0.5–1.3)
CRYPTOC AG CSF-ACNC: NEGATIVE — SIGNIFICANT CHANGE UP
CSF PCR RESULT: SIGNIFICANT CHANGE UP
DSDNA AB SER-ACNC: <12 IU/ML — SIGNIFICANT CHANGE UP
FIBRINOGEN PPP-MCNC: 566 MG/DL — HIGH (ref 290–520)
GAMMA GLOBULIN: 0.9 G/DL — SIGNIFICANT CHANGE UP (ref 0.6–1.6)
GLUCOSE CSF-MCNC: 55 MG/DL — SIGNIFICANT CHANGE UP (ref 40–70)
GLUCOSE SERPL-MCNC: 96 MG/DL — SIGNIFICANT CHANGE UP (ref 70–99)
GRAM STN FLD: SIGNIFICANT CHANGE UP
LABORATORY COMMENT REPORT: SIGNIFICANT CHANGE UP
LYMPHOCYTES # CSF: 90 % — HIGH (ref 40–80)
MONOS+MACROS NFR CSF: 10 % — LOW (ref 15–45)
NEUTROPHILS # CSF: 0 % — SIGNIFICANT CHANGE UP (ref 0–6)
NRBC NFR CSF: 1 /UL — SIGNIFICANT CHANGE UP (ref 0–5)
PCA AB SER-ACNC: SIGNIFICANT CHANGE UP
POTASSIUM SERPL-MCNC: 3.9 MMOL/L — SIGNIFICANT CHANGE UP (ref 3.5–5.3)
POTASSIUM SERPL-SCNC: 3.9 MMOL/L — SIGNIFICANT CHANGE UP (ref 3.5–5.3)
PROT CSF-MCNC: 18 MG/DL — SIGNIFICANT CHANGE UP (ref 15–45)
PROT PATTERN SERPL ELPH-IMP: SIGNIFICANT CHANGE UP
PROT SERPL-MCNC: 6 G/DL — SIGNIFICANT CHANGE UP (ref 6–8.3)
PROT SERPL-MCNC: 6 G/DL — SIGNIFICANT CHANGE UP (ref 6–8.3)
RBC # CSF: 0 /UL — SIGNIFICANT CHANGE UP (ref 0–0)
RH IG SCN BLD-IMP: POSITIVE — SIGNIFICANT CHANGE UP
RH IG SCN BLD-IMP: POSITIVE — SIGNIFICANT CHANGE UP
SODIUM SERPL-SCNC: 132 MMOL/L — LOW (ref 135–145)
SOURCE HSV 1/2: SIGNIFICANT CHANGE UP
SPECIMEN SOURCE FLD: SIGNIFICANT CHANGE UP
SPECIMEN SOURCE: SIGNIFICANT CHANGE UP
T PALLIDUM AB TITR SER: NEGATIVE — SIGNIFICANT CHANGE UP
TM INTERPRETATION: SIGNIFICANT CHANGE UP
TUBE TYPE: SIGNIFICANT CHANGE UP

## 2020-07-23 PROCEDURE — 71260 CT THORAX DX C+: CPT | Mod: 26

## 2020-07-23 PROCEDURE — 88188 FLOWCYTOMETRY/READ 9-15: CPT

## 2020-07-23 PROCEDURE — 74177 CT ABD & PELVIS W/CONTRAST: CPT | Mod: 26

## 2020-07-23 PROCEDURE — 36514 APHERESIS PLASMA: CPT

## 2020-07-23 PROCEDURE — 99233 SBSQ HOSP IP/OBS HIGH 50: CPT

## 2020-07-23 PROCEDURE — 88108 CYTOPATH CONCENTRATE TECH: CPT | Mod: 26

## 2020-07-23 RX ORDER — SODIUM CHLORIDE 9 MG/ML
1000 INJECTION INTRAMUSCULAR; INTRAVENOUS; SUBCUTANEOUS
Refills: 0 | Status: DISCONTINUED | OUTPATIENT
Start: 2020-07-23 | End: 2020-07-24

## 2020-07-23 RX ORDER — LIDOCAINE HCL 20 MG/ML
20 VIAL (ML) INJECTION ONCE
Refills: 0 | Status: DISCONTINUED | OUTPATIENT
Start: 2020-07-23 | End: 2020-08-04

## 2020-07-23 RX ADMIN — FINASTERIDE 5 MILLIGRAM(S): 5 TABLET, FILM COATED ORAL at 13:32

## 2020-07-23 RX ADMIN — AMLODIPINE BESYLATE 5 MILLIGRAM(S): 2.5 TABLET ORAL at 05:30

## 2020-07-23 RX ADMIN — SIMVASTATIN 10 MILLIGRAM(S): 20 TABLET, FILM COATED ORAL at 21:09

## 2020-07-23 RX ADMIN — Medication 1 MILLIGRAM(S): at 07:26

## 2020-07-23 RX ADMIN — PREGABALIN 1000 MICROGRAM(S): 225 CAPSULE ORAL at 13:32

## 2020-07-23 NOTE — PROGRESS NOTE ADULT - ASSESSMENT
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

## 2020-07-23 NOTE — PROGRESS NOTE ADULT - SUBJECTIVE AND OBJECTIVE BOX
*************************************  NEUROLOGY PROGRESS NOTE  **************************************    DENA FARNSWORTH  Male  MRN-[id number]    Subjective:  No acute events overnight. Today patient...    VITAL SIGNS:  Vital Signs Last 24 Hrs  T(C): 36.5 (22 Jul 2020 04:47), Max: 36.7 (21 Jul 2020 08:04)  T(F): 97.7 (22 Jul 2020 04:47), Max: 98.1 (21 Jul 2020 10:37)  HR: 56 (22 Jul 2020 04:47) (46 - 70)  BP: 115/74 (22 Jul 2020 04:47) (109/61 - 129/75)  BP(mean): --  RR: 18 (22 Jul 2020 04:47) (16 - 18)  SpO2: 98% (22 Jul 2020 04:47) (95% - 99%)    MEDICATIONS  (STANDING):  amLODIPine   Tablet 5 milliGRAM(s) Oral daily  enoxaparin Injectable 40 milliGRAM(s) SubCutaneous daily  finasteride 5 milliGRAM(s) Oral daily  simvastatin 10 milliGRAM(s) Oral at bedtime    MEDICATIONS  (PRN):  LORazepam   Injectable 2 milliGRAM(s) IV Push once PRN Seizure activity      PHYSICAL EXAMINATION:  General: Well-developed, well nourished, in no acute distress.  Neurologic:  - Mental Status:  Alert, awake, oriented to person, place, and time; Speech is fluent but hoarse with intact naming, repetition, and comprehension. Slowed speech. Intact serial 7s, 2/3 5 minute recall. Blunted affect, hypomimic.  - Cranial Nerves II-XII:  VFF, No nystagmus or APD noted, EOMI, hypometric saccadic movements on horizontal tracking, PERRLA, V1-V3 intact, slight R nasolabial flattening, t/p midline, SCM/trap intact.  - Motor:  Strength is 5/5 throughout.  There is no pronator drift.  Normal muscle bulk. Slight rigidity in the lower extremities bilaterally R>L. Occasional dystonic movements of the R and L arms.  - Reflexes:  2+ and symmetric at the biceps, triceps, brachioradialis, knees, and ankles.  Plantar responses flexor.  - Sensory:  Intact to light touch, pin prick, and joint-position sense throughout. Decreased vibratory sense in the lower extremities bilaterally.   - Coordination:  Finger-nose-finger and heel-knee-shin intact without dysmetria.  Rapid alternating hand movements intact.  - Gait:   Deferred.    LABS:                          13.8   6.96  )-----------( 236      ( 21 Jul 2020 08:53 )             41.8     07-21    137  |  101  |  33<H>  ----------------------------<  130<H>  3.5   |  25  |  1.22    Ca    9.4      21 Jul 2020 08:53    TPro  6.7  /  Alb  3.9  /  TBili  0.5  /  DBili  x   /  AST  25  /  ALT  22  /  AlkPhos  65  07-21          RADIOLOGY & ADDITIONAL STUDIES:      Outside MRI report noting:   IMPRESSION:  Asymmetric FLAIR hyperintensity in the right limbic lobe involving the hippocampal gyrus. Differential diagnosis includes, but is not limited to, infectious/inflammatory encephalitis, limbic encephalitis and sequela of epilepsy/post ictal edema. Additional mild chronic microvascular changes without acute infarct.    EEG:  EEG Summary:  Abnormal EEG in the awake, drowsy and asleep states.  -  Electrographical seizures, right temporal, with spread to the left   -  Occasional sharp wave, focal, right temporal   - Bitemporal TIRDA  -  Intermittent focal slowing, bilateral temporal   -  Diffuse excess beta activity.      Impression/Clinical Correlate:    Abnormal EEG due to     1.	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	There is epileptogenic focus with structural or functional abnormality in bilateral temporal regions, right greater than left.   Diffuse excess beta activity can be seen in patient treated with sedative medications        MRI brain here as per my read noting hyperintensity in the right limbic area similar to outside MRI. Official read pending *************************************  NEUROLOGY PROGRESS NOTE  **************************************    DENA FARNSWORTH  Male  MRN-[id number]    Subjective:  No acute events overnight. Today patient feels the same as yesterday. Continues to have dystonic movement of his arms. Felt tired after his tests yesterday.     VITAL SIGNS:  Vital Signs Last 24 Hrs  T(C): 36.5 (22 Jul 2020 04:47), Max: 36.7 (21 Jul 2020 08:04)  T(F): 97.7 (22 Jul 2020 04:47), Max: 98.1 (21 Jul 2020 10:37)  HR: 56 (22 Jul 2020 04:47) (46 - 70)  BP: 115/74 (22 Jul 2020 04:47) (109/61 - 129/75)  BP(mean): --  RR: 18 (22 Jul 2020 04:47) (16 - 18)  SpO2: 98% (22 Jul 2020 04:47) (95% - 99%)    MEDICATIONS  (STANDING):  amLODIPine   Tablet 5 milliGRAM(s) Oral daily  enoxaparin Injectable 40 milliGRAM(s) SubCutaneous daily  finasteride 5 milliGRAM(s) Oral daily  simvastatin 10 milliGRAM(s) Oral at bedtime    MEDICATIONS  (PRN):  LORazepam   Injectable 2 milliGRAM(s) IV Push once PRN Seizure activity      PHYSICAL EXAMINATION:  General: Well-developed, well nourished, in no acute distress.  Neurologic:  - Mental Status:  Alert, awake, oriented to person, place, and time; Speech is fluent but hoarse with intact naming, repetition, and comprehension. Slowed speech. Intact serial 7s, 2/3 5 minute recall. Blunted affect, hypomimic.  - Cranial Nerves II-XII:  VFF, No nystagmus or APD noted, EOMI, hypometric saccadic movements on horizontal tracking, PERRLA, V1-V3 intact, slight R nasolabial flattening, t/p midline, SCM/trap intact.  - Motor:  Strength is 5/5 throughout.  There is no pronator drift.  Normal muscle bulk. Slight rigidity in the lower extremities bilaterally R>L. Occasional dystonic movements of the R and L arms.  - Reflexes:  2+ and symmetric at the biceps, triceps, brachioradialis, knees, and ankles.  Plantar responses flexor.  - Sensory:  Intact to light touch, pin prick, and joint-position sense throughout. Decreased vibratory sense in the lower extremities bilaterally.   - Coordination:  Finger-nose-finger and heel-knee-shin intact without dysmetria.  Rapid alternating hand movements intact.  - Gait:   Deferred.    LABS:                          13.8   6.96  )-----------( 236      ( 21 Jul 2020 08:53 )             41.8     07-21    137  |  101  |  33<H>  ----------------------------<  130<H>  3.5   |  25  |  1.22    Ca    9.4      21 Jul 2020 08:53    TPro  6.7  /  Alb  3.9  /  TBili  0.5  /  DBili  x   /  AST  25  /  ALT  22  /  AlkPhos  65  07-21          RADIOLOGY & ADDITIONAL STUDIES:      Outside MRI report noting:   IMPRESSION:  Asymmetric FLAIR hyperintensity in the right limbic lobe involving the hippocampal gyrus. Differential diagnosis includes, but is not limited to, infectious/inflammatory encephalitis, limbic encephalitis and sequela of epilepsy/post ictal edema. Additional mild chronic microvascular changes without acute infarct.    EEG:  EEG Summary:  Abnormal EEG in the awake, drowsy and asleep states.  -  Electrographical seizures, right temporal, with spread to the left   -  Occasional sharp wave, focal, right temporal   - Bitemporal TIRDA  -  Intermittent focal slowing, bilateral temporal   -  Diffuse excess beta activity.      Impression/Clinical Correlate:    Abnormal EEG due to     1.	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	There is epileptogenic focus with structural or functional abnormality in bilateral temporal regions, right greater than left.   Diffuse excess beta activity can be seen in patient treated with sedative medications        MRI brain here as per my read noting hyperintensity in the right limbic area similar to outside MRI.   < from: MR Head w/wo IV Cont (07.22.20 @ 18:41) >  IMPRESSION:    Redemonstration of T2 and FLAIR hyperintense signal within the mesial right temporal lobe in the region of the hippocampal and parahippocampal gyri, with associated subtle swelling. Differential diagnosis includes autoimmune encephalitis, herpes encephalitis, low-grade glioblastoma, and sequelae of recentseizure.    No abnormal parenchymal or leptomeningeal enhancement.    No acute intracranial hemorrhage or acute infarction.    Dr. Das discussed these findings with neurology team caring for the patient on 7/23/2020 9:31 AM with read back.    < end of copied text >

## 2020-07-23 NOTE — CONSULT NOTE ADULT - ASSESSMENT
75 year old male with no significant past history presented with a month old history of involuntary movement of the LUE along with weakness that has now progressed to involve his RUE and LE.  The patient describes it as initial spasms that later progresses to shaking. His weakness has led to inability to hold heavy objects. Had multiple falls in June. He denies syncope, loss of consciousness, tongue biting, and urinary incontinence. Lives alone and ambulates without assistance.  Patient denies weight loss, fevers, chills, abdominal pain, cough, chest pain, nausea, vomiting, numbness, tingling, headaches, vision changes, sick contacts, confusion, memory difficulties, change in behavior/personality, and sleep disturbances.  S/p LP this morning pending AIE panel and ID panel. S/p CTAP this morning negative for malignancy or lymph node enlargement (to r/o paraneoplastic syndrome).   Working diagnosis as supported by  MRI on 7/22: hyperintense signal within the mesial right temporal lobe in the region of the hippocampal and parahippocampal gyri, with associated subtle swelling. Differential diagnosis includes autoimmune encephalitis, herpes encephalitis, low-grade glioblastoma, and sequelae of recent seizure. 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 Hence neuro consulted blood bank for PLEX.    Discussed in detail about PLEX and this risks and benefits of using albumin or FFP as replacement. PLEX with his unusual presentation of encephalitis is a trial of procedures and alternatives were explained. All questions answered and after patient verbalizing the understanding consent was obtained.    Plan to perform 5 PLEX over the next 10 days for LIMBIC ENCEPHALITIS awaiting AIE panel results.   PLEX#1 today well tolerated with 5% albumin as replacement fluid. (Fibrinogen 550). Processed 1.0 plasma volume. PLEX#2 scheduled for 7/25. D/w neuro and apheresis team.

## 2020-07-23 NOTE — PROGRESS NOTE ADULT - SUBJECTIVE AND OBJECTIVE BOX
Patient is a 75y old  Male who presents with a chief complaint of limbic encephalitis (23 Jul 2020 11:36)      SUBJECTIVE / OVERNIGHT EVENTS:    Patient seen and examined. denies tremors or shaking of extremities. no cp sob. EEG ongoing.      Vital Signs Last 24 Hrs  T(C): 36.7 (23 Jul 2020 12:07), Max: 37 (22 Jul 2020 20:47)  T(F): 98 (23 Jul 2020 12:07), Max: 98.6 (22 Jul 2020 20:47)  HR: 56 (23 Jul 2020 12:07) (56 - 58)  BP: 121/74 (23 Jul 2020 12:07) (106/65 - 130/79)  BP(mean): --  RR: 16 (23 Jul 2020 12:07) (16 - 18)  SpO2: 95% (23 Jul 2020 07:53) (95% - 98%)  I&O's Summary    22 Jul 2020 07:01  -  23 Jul 2020 07:00  --------------------------------------------------------  IN: 560 mL / OUT: 150 mL / NET: 410 mL    23 Jul 2020 07:01  -  23 Jul 2020 12:40  --------------------------------------------------------  IN: 240 mL / OUT: 0 mL / NET: 240 mL        PE:  GENERAL: NAD, AAOx3  HEAD:  Atraumatic, Normocephalic  CHEST/LUNG: CTABL, No wheeze  HEART: Regular rate and rhythm; no murmur  ABDOMEN: Soft, Nontender, Nondistended; Bowel sounds present  EXTREMITIES:  2+ Peripheral Pulses, No clubbing, cyanosis, or edema  SKIN: No rashes or lesions, R neck CVC  NEURO: No focal deficits    LABS:                        13.9   6.73  )-----------( 209      ( 22 Jul 2020 06:30 )             42.2     07-23    132<L>  |  99  |  18  ----------------------------<  96  3.9   |  23  |  1.00    Ca    8.6      23 Jul 2020 06:24  Mg     2.1     07-22      PT/INR - ( 22 Jul 2020 08:20 )   PT: 13.5 sec;   INR: 1.15 ratio         PTT - ( 22 Jul 2020 08:20 )  PTT:30.2 sec  CAPILLARY BLOOD GLUCOSE                RADIOLOGY & ADDITIONAL TESTS:    Imaging Personally Reviewed:  [x] YES  [ ] NO    Consultant(s) Notes Reviewed:  [x] YES  [ ] NO    MEDICATIONS  (STANDING):  chlorhexidine 4% Liquid 1 Application(s) Topical <User Schedule>  cyanocobalamin Injectable 1000 MICROGram(s) IntraMuscular daily  enoxaparin Injectable 40 milliGRAM(s) SubCutaneous daily  finasteride 5 milliGRAM(s) Oral daily  lidocaine 1% Injectable 20 milliLiter(s) Local Injection once  simvastatin 10 milliGRAM(s) Oral at bedtime  sodium chloride 0.9%. 1000 milliLiter(s) (85 mL/Hr) IV Continuous <Continuous>    MEDICATIONS  (PRN):  LORazepam   Injectable 2 milliGRAM(s) IV Push once PRN Seizure activity  polyethylene glycol 3350 17 Gram(s) Oral daily PRN Constipation  sodium chloride 0.9% lock flush 10 milliLiter(s) IV Push every 1 hour PRN Pre/post blood products, medications, blood draw, and to maintain line patency      Care Discussed with Consultants/Other Providers [x] YES  [ ] NO    HEALTH ISSUES - PROBLEM Dx:  Limbic encephalitis: Limbic encephalitis  Bradycardia: Bradycardia

## 2020-07-23 NOTE — PROGRESS NOTE ADULT - ASSESSMENT
74 y/o male with PMHx BPH, HTN, and HLD presents to ED with intermittent left upper extremity tonic clonic activity with progression to right upper and lower extremities and multiple falls since June, concerning for seizures. Outpatient MRI brain without contrast demonstrates hyperenhancement of right limbic lobe. Patient is not encephalopathic at this time.     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 Differentials also includes autoimmune encephalitis paraneoplastic encephalitis vs. other inflammatory encephalitis vs. infectious encephalitis vs. less likely focal dystonia.    Plan  [] Repeat MRI brain with contrast, official read pending  [x] Report of outside MRI as above, read by in house neuroradiology noting enhancement in R limbic area with broad differential of autoimmune vs infectious, concerning for limbic encephalitis. C-spine MRI with chronic degenerative changes, no acute lesions noted  [] LP to send for autoimmune/paraneoplatic/infectious panel, today  [] Follow up serum autoimmune workup  [] CT chest/abdomen/pelvis with contrast for paraneoplastic workup  [] PLEX therapy today after LP, appreciate shiley placed by IR  [x] cardiology consultation for bradycardia, recs appreciated, check TTE and thyroid panel  [x] EEG, report as above  [x] low B12, obtain methylmalonic, homocysteine, anti-parietal cell, intrinsic factor. Start IM B12 x 7 days  [x] Seizure, fall and aspiration precautions.  Avoid sleep deprivation.   [x] Patient's emergency contact is his close friend, Belinda [phone].  [] IV lorazepam 2mg IV prn for seizure activity lasting >3-5mins, >2x/hr, >3x/day, or if GTC , repeat after 1 minute (max dose 0.1 mg/kg)      DVT PPx: held today pending LP, restart Lovenox 40mg subq after LP    Case discussed with Dr. Justice. 74 y/o male with PMHx BPH, HTN, and HLD presents to ED with intermittent left upper extremity tonic clonic activity with progression to right upper and lower extremities and multiple falls since June, concerning for seizures. Outpatient MRI brain without contrast demonstrates hyperenhancement of right limbic lobe. Patient is not encephalopathic at this time.     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 Differentials also includes autoimmune encephalitis paraneoplastic encephalitis vs. other inflammatory encephalitis vs. infectious encephalitis vs. less likely focal dystonia.    Plan  [x] Repeat MRI brain with contrast, official read as above  [x] Report of outside MRI as above, read by in house neuroradiology noting enhancement in R limbic area with broad differential of autoimmune vs infectious, concerning for limbic encephalitis. C-spine MRI with chronic degenerative changes, no acute lesions noted  [x] LP to send for autoimmune/paraneoplatic/infectious panel  [] Follow up serum autoimmune workup  [] CT chest/abdomen/pelvis with contrast for paraneoplastic workup  [] PLEX therapy today after LP, appreciate shiley placed by IR, total 5 sessions scheduled  [x] cardiology consultation for bradycardia, recs appreciated, check TTE and thyroid panel  [x] EEG, report as above  [x] low B12, obtain methylmalonic, homocysteine, anti-parietal cell, intrinsic factor. Start IM B12 x 7 days  [x] Seizure, fall and aspiration precautions.  Avoid sleep deprivation.   [x] Patient's emergency contact is his close friend, Belinda [phone].  [] IV lorazepam 2mg IV prn for seizure activity lasting >3-5mins, >2x/hr, >3x/day, or if GTC , repeat after 1 minute (max dose 0.1 mg/kg)      DVT PPx: held today pending LP, restart Lovenox 40mg subq after LP    Case discussed with Dr. Justice. 74 y/o male with PMHx BPH, HTN, and HLD presents to ED with intermittent left upper extremity tonic clonic activity with progression to right upper and lower extremities and multiple falls since June, concerning for seizures. Outpatient MRI brain without contrast demonstrates hyperenhancement of right limbic lobe. Patient is not encephalopathic at this time.     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 Differentials also includes autoimmune encephalitis paraneoplastic encephalitis vs. other inflammatory encephalitis vs. infectious encephalitis vs. less likely focal dystonia.    Plan  [x] LP to send for autoimmune/paraneoplatic/infectious panel, pending results  [] Follow up serum autoimmune workup  [] CT chest/abdomen/pelvis with contrast for paraneoplastic workup  [] PLEX therapy today after LP, appreciate rogelio placed by IR, total 5 sessions scheduled  [] home amlodipine held due to low BP and PLEX therapy, can restart if patient becomes hypertensive  [x] Repeat MRI brain with contrast, official read as above  [x] Report of outside MRI as above, read by in house neuroradiology noting enhancement in R limbic area with broad differential of autoimmune vs infectious, concerning for limbic encephalitis. C-spine MRI with chronic degenerative changes, no acute lesions noted  [x] EEG, report as above  [x] cardiology consultation for bradycardia, recs appreciated, check TTE and thyroid panel  [x] low B12, obtain methylmalonic, homocysteine, anti-parietal cell, intrinsic factor. Start IM B12 x 7 days  [x] Seizure, fall and aspiration precautions.  Avoid sleep deprivation.   [x] Patient's emergency contact is his close friend, Belinda [phone].  [] IV lorazepam 2mg IV prn for seizure activity lasting >3-5mins, >2x/hr, >3x/day, or if GTC , repeat after 1 minute (max dose 0.1 mg/kg)      DVT PPx: held today pending LP, restart Lovenox 40mg subq after LP    Case discussed with Dr. Justice.

## 2020-07-23 NOTE — PROCEDURE NOTE - NSPROCDETAILS_GEN_ALL_CORE
area cleaned in sterile fashion/CSF Obtained/location identified, draped/prepped, sterile technique used, needle inserted/introduced
lumen(s) aspirated and flushed/ultrasound guidance/sterile technique, catheter placed/guidewire recovered

## 2020-07-23 NOTE — PROGRESS NOTE ADULT - ASSESSMENT
74 yo male PMHx BPH, HTN, and HLD presents to ED with intermittent left upper extremity tonic clonic activity with progression to right upper and lower extremities and multiple falls since June, concerning for seizures. Outpatient MRI brain without contrast demonstrates hyperenhancement of right limbic lobe.     # Bilateral upper extremity tonic clonic activity secondary to possible focal seizure vs encephalitis  # HTN  # HLD  # BPH    Appreciate neurology care  abnormal EEG per neurology  Follow up serum autoimmune workup  MRI brain signal in right temporal lobe concern for LGI1 encephalitis  LP  CT chest/abdomen/pelvis with contrast negative for malignancy  cont proscar  cont statin    PCP Dr. Tevin Ovalle    Please call [facility] with questions [phone].

## 2020-07-23 NOTE — CONSULT NOTE ADULT - SUBJECTIVE AND OBJECTIVE BOX
Seen and examined this morning in his room prior to CT   HPI: 75 year old male with no significant past history presented with a month old history of involuntary movement of the LUE along with weakness that has now progressed to involve his RUE and LE.  The patient describes it as initial spasms that later progresses to shaking. His weakness has led to inability to hold heavy objects. Had multiple falls in June. He denies syncope, loss of consciousness, tongue biting, and urinary incontinence. Lives alone and ambulates without assistance.  Patient denies weight loss, fevers, chills, abdominal pain, cough, chest pain, nausea, vomiting, numbness, tingling, headaches, visual disturbances, sick contacts, confusion, memory difficulties, change in behavior/personality, and sleep disturbances. No hx of bleeding. Denies smoking/ETOH/drug abuse  S/p LP this morning pending AIE panel and ID panel. S/p CTAP this morning negative for malignancy or lymph node enlargement (to r/o paraneoplastic syndrome).  S/p MRI on 7/22: hyperintense signal within the mesial right temporal lobe in the region of the hippocampal and parahippocampal gyri, with associated subtle swelling. Differential diagnosis includes autoimmune encephalitis, herpes encephalitis, low-grade glioblastoma, and sequelae of recent seizure.    EEG  concerning for faciobrachial dystonic seizures commonly seen in LGI1 autoimmune encephalitis. Hence neuro consulted blood bank for PLEX.     Past Medical Hx: HTN, HLD and BPH    Allergies: No Known Allergies    MEDICATIONS  reviewed: no changes in dosing required due to PLEX.  cyanocobalamin Injectabl; enoxaparin Injectable sq; finasteride PO, simvastatin     MEDICATIONS  (PRN):  LoRazepam         Vital Signs Last 24 Hrs  T(C): 36.3 (23 Jul 2020 16:00), Max: 37 (22 Jul 2020 20:47)  T(F): 97.4 (23 Jul 2020 16:00), Max: 98.6 (22 Jul 2020 20:47)  HR: 59 (23 Jul 2020 16:00) (56 - 59)  BP: 111/65 (23 Jul 2020 16:00) (106/65 - 130/79)  RR: 18 (23 Jul 2020 16:00) (16 - 18)  SpO2: 98% (23 Jul 2020 16:00) (95% - 98%)    PHYSICAL EXAM:  General: WN/WD NAD sluggish affect  Eyes: No jaundice  ENT: MMM  Respiratory: CTA B/L  CV: S1 S2 bradycardia+ regular rhythm  Abdominal: Soft, NT, ND +BS  Musculoskeletal/Extremities: full range of motion X 4, no edema  Neurology: A&Ox3, 5/5 all extremities, sensory intact, gait no abnormality. No tremors noted. See neuro note for detailed exam   Skin: No rash, no petechia  Catheters/Tubes/Wires: KIM mercado                  13.9   6.73  )-----------( 209      ( 22 Jul 2020 06:30 )             42.2     07-23    132<L>  |  99  |  18  ----------------------------<  96  3.9   |  23  |  1.00    Ca    8.6      23 Jul 2020 06:24  Mg     2.1     07-22    TPro  6.0  /  Alb  x   /  TBili  x   /  DBili  x   /  AST  x   /  ALT  x   /  AlkPhos  x   07-22  PT/INR - ( 22 Jul 2020 08:20 )   PT: 13.5 sec;   INR: 1.15 ratio    PTT - ( 22 Jul 2020 08:20 )  PTT:30.2 sec  Fibrinogen Assay: 566 mg/dL (07-23 @ 07:51)

## 2020-07-23 NOTE — PROCEDURE NOTE - NSPOSTCAREGUIDE_GEN_A_CORE
Verbal/written post procedure instructions were given to patient/caregiver
Keep the cast/splint/dressing clean and dry/Care for catheter as per unit/ICU protocols

## 2020-07-23 NOTE — PROGRESS NOTE ADULT - SUBJECTIVE AND OBJECTIVE BOX
Neurology Progress Note      the patient's symptoms  --- are  unchanged    MEDICATIONS  (STANDING):  amLODIPine   Tablet 5 milliGRAM(s) Oral daily  chlorhexidine 4% Liquid 1 Application(s) Topical <User Schedule>  cyanocobalamin Injectable 1000 MICROGram(s) IntraMuscular daily  enoxaparin Injectable 40 milliGRAM(s) SubCutaneous daily  finasteride 5 milliGRAM(s) Oral daily  lidocaine 1% Injectable 20 milliLiter(s) Local Injection once  simvastatin 10 milliGRAM(s) Oral at bedtime  sodium chloride 0.9%. 1000 milliLiter(s) (85 mL/Hr) IV Continuous <Continuous>    MEDICATIONS  (PRN):  LORazepam   Injectable 2 milliGRAM(s) IV Push once PRN Seizure activity  polyethylene glycol 3350 17 Gram(s) Oral daily PRN Constipation  sodium chloride 0.9% lock flush 10 milliLiter(s) IV Push every 1 hour PRN Pre/post blood products, medications, blood draw, and to maintain line patency              Vital Signs Last 24 Hrs  T(C): 36.7 (23 Jul 2020 07:53), Max: 37 (22 Jul 2020 20:47)  T(F): 98.1 (23 Jul 2020 07:53), Max: 98.6 (22 Jul 2020 20:47)  HR: 57 (23 Jul 2020 07:53) (53 - 66)  BP: 123/77 (23 Jul 2020 07:53) (105/66 - 130/79)  BP(mean): --  RR: 18 (23 Jul 2020 07:53) (18 - 18)  SpO2: 95% (23 Jul 2020 07:53) (95% - 99%)    Physical exam  MENTAL STATUS- Alert, attends, fluent, non-dysarthric, follows commands, oriented, good memory and affect.  CRANIAL NERVES-II Optic - Bilateral - Normal Visual Fields. III-IV-VI EOMI & Pupils - Bilateral - Normal Bilaterally. V Trigeminal - Bilateral - Normal bilateral facial sensation and jaw movement. VII Facial - Normal bilateral facial movement. VIII Acoustic - Bilateral - Hearing normal to voice bilaterally. IX Glossopharyngeal / X Vagus - Normal palate elevates symmetrically. XI Accessory - Normal Bilaterally. XII Hypoglossal - Tongue protrudes midline and moves symmetrically.  MOTOR-Bulk and Contour - Normal. Tone - Normal tone, no abnormal movements. Strength - 5/5 normal muscle strength - Strength full throughout.  SENSORY-Normal - LT, DSS, Vibration.  REFLEXES- DTR's 2+ throughout.  COORDINATION-Normal FFM, SAMANTHA, FNF - bilaterally.  GAIT-NA    CBC Full  -  ( 22 Jul 2020 06:30 )  WBC Count : 6.73 K/uL  RBC Count : 4.72 M/uL  Hemoglobin : 13.9 g/dL  Hematocrit : 42.2 %  Platelet Count - Automated : 209 K/uL  Mean Cell Volume : 89.4 fl  Mean Cell Hemoglobin : 29.4 pg  Mean Cell Hemoglobin Concentration : 32.9 gm/dL  Auto Neutrophil # : 4.53 K/uL  Auto Lymphocyte # : 1.37 K/uL  Auto Monocyte # : 0.66 K/uL  Auto Eosinophil # : 0.10 K/uL  Auto Basophil # : 0.05 K/uL  Auto Neutrophil % : 67.3 %  Auto Lymphocyte % : 20.4 %  Auto Monocyte % : 9.8 %  Auto Eosinophil % : 1.5 %  Auto Basophil % : 0.7 %      07-23    132<L>  |  99  |  18  ----------------------------<  96  3.9   |  23  |  1.00    Ca    8.6      23 Jul 2020 06:24  Mg     2.1     07-22                radiology type ( ~?rad)

## 2020-07-23 NOTE — PROGRESS NOTE ADULT - SUBJECTIVE AND OBJECTIVE BOX
Subjective: Patient seen and examined. No new events except as noted.   s/p LP   started plasmapharesis   feels ok     REVIEW OF SYSTEMS:    CONSTITUTIONAL:+ weakness, fevers or chills  EYES/ENT: No visual changes;  No vertigo or throat pain   NECK: No pain or stiffness  RESPIRATORY: No cough, wheezing, hemoptysis; No shortness of breath  CARDIOVASCULAR: No chest pain or palpitations  GASTROINTESTINAL: No abdominal or epigastric pain. No nausea, vomiting, or hematemesis; No diarrhea or constipation. No melena or hematochezia.  GENITOURINARY: No dysuria, frequency or hematuria  NEUROLOGICAL: No numbness or weakness  SKIN: No itching, burning, rashes, or lesions   All other review of systems is negative unless indicated above.    MEDICATIONS:  MEDICATIONS  (STANDING):  chlorhexidine 4% Liquid 1 Application(s) Topical <User Schedule>  cyanocobalamin Injectable 1000 MICROGram(s) IntraMuscular daily  enoxaparin Injectable 40 milliGRAM(s) SubCutaneous daily  finasteride 5 milliGRAM(s) Oral daily  lidocaine 1% Injectable 20 milliLiter(s) Local Injection once  simvastatin 10 milliGRAM(s) Oral at bedtime  sodium chloride 0.9%. 1000 milliLiter(s) (85 mL/Hr) IV Continuous <Continuous>      PHYSICAL EXAM:  T(C): 36.7 (07-23-20 @ 07:53), Max: 37 (07-22-20 @ 20:47)  HR: 57 (07-23-20 @ 07:53) (53 - 66)  BP: 123/77 (07-23-20 @ 07:53) (105/66 - 130/79)  RR: 18 (07-23-20 @ 07:53) (18 - 18)  SpO2: 95% (07-23-20 @ 07:53) (95% - 98%)  Wt(kg): --  I&O's Summary    22 Jul 2020 07:01  -  23 Jul 2020 07:00  --------------------------------------------------------  IN: 560 mL / OUT: 150 mL / NET: 410 mL            Appearance: NAD	  HEENT:   Normal oral mucosa, PERRL, EOMI	  Lymphatic: No lymphadenopathy, R upper [place]   Cardiovascular: Normal S1 S2, No JVD, No murmurs, No edema  Respiratory: Lungs clear to auscultation	  Psychiatry: A & O x 3, Mood & affect appropriate  Gastrointestinal:  Soft, Non-tender, + BS	  Skin: No rashes, No ecchymoses, No cyanosis	  Extremities: Normal range of motion, No clubbing, cyanosis or edema  Vascular: Peripheral pulses palpable 2+ bilaterally  Neurologic:  EYES- non-icteric disks obscured  MENTAL STATUS- Alert, attends, fluent, non-dysarthric, follows commands, oriented, good memory and affect.?sluggish  CRANIAL NERVES-II Optic - Bilateral - Normal Visual Fields. III-IV-VI EOMI & Pupils - Bilateral - Normal Bilaterally. V Trigeminal - Bilateral - Normal bilateral facial sensation and jaw movement. VII Facial - Normal bilateral facial movement. VIII Acoustic - Bilateral - Hearing normal to voice bilaterally. IX Glossopharyngeal / X Vagus - Normal palate elevates symmetrically. XI Accessory - Normal Bilaterally. XII Hypoglossal - Tongue protrudes midline and moves symmetrically.  MOTOR-Bulk and Contour - Normal. Tone - Normal tone, no abnormal movements. Strength - 5/5 normal muscle strength - Strength full throughout.  SENSORY-Normal - LT, DSS, Vibration.  REFLEXES- DTR's 2+ throughout.  COORDINATION-Normal FFM, SAMANTHA, FNF - bilaterally.  GAIT-NA    LABS:    CARDIAC MARKERS:      Thyroid Stimulating Hormone, Serum in AM (07.22.20 @ 09:28)    Thyroid Stimulating Hormone, Serum: 1.80 uIU/mL                              13.9   6.73  )-----------( 209      ( 22 Jul 2020 06:30 )             42.2     07-23    132<L>  |  99  |  18  ----------------------------<  96  3.9   |  23  |  1.00    Ca    8.6      23 Jul 2020 06:24  Mg     2.1     07-22      proBNP:   Lipid Profile:   HgA1c:   TSH:       PROCEDURE:   · Procedure Name	Lumbar Puncture	  · Practitioner performing the TIME OUT	Portia	  · Procedure Date/Time	23-Jul-2020 07:45	  · Informed Consent	Benefits, risks, and possible complications of procedure explained to patient/caregiver who verbalized understanding and gave written consent.	  · Procedure Performed By	Myself	  · Procedure Assisted By	PA	  · Attending Provider	Reshma	  · Indications	CSF sampling	  · Procedure was	Diagnostic	  · Site Prep	chlorhexidine	  · Patient Position	sitting	  · Procedural Sedation Used	Yes	  · Local Anesthesia	1% lidocaine	  · Procedure Details	location identified, draped/prepped, sterile technique used, needle inserted/introduced; area cleaned in sterile fashion; CSF Obtained	  · Needle Gauge	20	  · Amount Of Fluid Obtained (mL)	24 milliLiter(s)	  · Findings	blood tinged that cleared	  · Specimen Obtained and Sent to Lab	cerebral spinal fluid	  · Tolerance	Patient tolerated procedure well.	  · Complications	no complications	  · Estimated Blood Loss	Minimal	  · Post-Procedure Care Guidelines	Verbal/written post procedure instructions were given to patient/caregiver	    PROCEDURE SELECTOR:   Procedure Selector:  PROCEDURES:  Lumbar puncture 23-Jul-2020 09:16:35  Manuel Pearce.      Electronic Signatures:  Manuel Pearce)  (Signed 23-Jul-2020 09:17)  	Authored: PROCEDURE, PROCEDURE SELECTOR  Vicky Justice)  (Signature Pending)  	Co-Signer: PROCEDURE, PROCEDURE SELECTOR      Last Updated: 23-Jul-2020 09:17 by Manuel Pearce)      TELEMETRY: 	 SR, SB    ECG:  	  RADIOLOGY:   DIAGNOSTIC TESTING:  [ ] Echocardiogram:  [ ]  Catheterization:  [ ] Stress Test:    OTHER:

## 2020-07-24 ENCOUNTER — TRANSCRIPTION ENCOUNTER (OUTPATIENT)
Age: 75
End: 2020-07-24

## 2020-07-24 LAB
ALBUMIN CSF-MCNC: 9.4 MG/DL — LOW (ref 14–25)
ANA TITR SER: NEGATIVE — SIGNIFICANT CHANGE UP
ANION GAP SERPL CALC-SCNC: 10 MMOL/L — SIGNIFICANT CHANGE UP (ref 5–17)
BUN SERPL-MCNC: 13 MG/DL — SIGNIFICANT CHANGE UP (ref 7–23)
CALCIUM SERPL-MCNC: 8.6 MG/DL — SIGNIFICANT CHANGE UP (ref 8.4–10.5)
CHLORIDE SERPL-SCNC: 100 MMOL/L — SIGNIFICANT CHANGE UP (ref 96–108)
CO2 SERPL-SCNC: 21 MMOL/L — LOW (ref 22–31)
CREAT SERPL-MCNC: 0.93 MG/DL — SIGNIFICANT CHANGE UP (ref 0.5–1.3)
DSDNA AB FLD-ACNC: <0.2 AI — SIGNIFICANT CHANGE UP
ENA SS-A AB FLD IA-ACNC: <0.2 AI — SIGNIFICANT CHANGE UP
GLUCOSE SERPL-MCNC: 95 MG/DL — SIGNIFICANT CHANGE UP (ref 70–99)
IGG CSF-MCNC: 1.2 MG/DL — SIGNIFICANT CHANGE UP
IGG SYNTH RATE SER+CSF CALC-MRATE: -4 MG/DAY — SIGNIFICANT CHANGE UP
IGG/ALB CSF: 0.13 RATIO — SIGNIFICANT CHANGE UP
N-METHYL-DA RECEPTOR AB IGG BY CBA-IFA, SERUM W/RFLX TITER: SIGNIFICANT CHANGE UP
POTASSIUM SERPL-MCNC: 4 MMOL/L — SIGNIFICANT CHANGE UP (ref 3.5–5.3)
POTASSIUM SERPL-SCNC: 4 MMOL/L — SIGNIFICANT CHANGE UP (ref 3.5–5.3)
PROT CSF-MCNC: 18 MG/DL — SIGNIFICANT CHANGE UP (ref 15–45)
SODIUM SERPL-SCNC: 131 MMOL/L — LOW (ref 135–145)

## 2020-07-24 PROCEDURE — ZZZZZ: CPT

## 2020-07-24 PROCEDURE — 99232 SBSQ HOSP IP/OBS MODERATE 35: CPT

## 2020-07-24 RX ORDER — SODIUM CHLORIDE 9 MG/ML
2 INJECTION INTRAMUSCULAR; INTRAVENOUS; SUBCUTANEOUS EVERY 8 HOURS
Refills: 0 | Status: DISCONTINUED | OUTPATIENT
Start: 2020-07-24 | End: 2020-07-24

## 2020-07-24 RX ORDER — SODIUM CHLORIDE 9 MG/ML
1000 INJECTION INTRAMUSCULAR; INTRAVENOUS; SUBCUTANEOUS
Refills: 0 | Status: DISCONTINUED | OUTPATIENT
Start: 2020-07-24 | End: 2020-07-29

## 2020-07-24 RX ORDER — VALPROIC ACID (AS SODIUM SALT) 250 MG/5ML
250 SOLUTION, ORAL ORAL THREE TIMES A DAY
Refills: 0 | Status: DISCONTINUED | OUTPATIENT
Start: 2020-07-24 | End: 2020-07-28

## 2020-07-24 RX ORDER — SODIUM CHLORIDE 9 MG/ML
2 INJECTION INTRAMUSCULAR; INTRAVENOUS; SUBCUTANEOUS EVERY 8 HOURS
Refills: 0 | Status: COMPLETED | OUTPATIENT
Start: 2020-07-24 | End: 2020-07-27

## 2020-07-24 RX ADMIN — FINASTERIDE 5 MILLIGRAM(S): 5 TABLET, FILM COATED ORAL at 12:45

## 2020-07-24 RX ADMIN — Medication 25 MILLIGRAM(S): at 21:03

## 2020-07-24 RX ADMIN — SODIUM CHLORIDE 2 GRAM(S): 9 INJECTION INTRAMUSCULAR; INTRAVENOUS; SUBCUTANEOUS at 21:03

## 2020-07-24 RX ADMIN — ENOXAPARIN SODIUM 40 MILLIGRAM(S): 100 INJECTION SUBCUTANEOUS at 12:44

## 2020-07-24 RX ADMIN — SIMVASTATIN 10 MILLIGRAM(S): 20 TABLET, FILM COATED ORAL at 21:03

## 2020-07-24 RX ADMIN — CHLORHEXIDINE GLUCONATE 1 APPLICATION(S): 213 SOLUTION TOPICAL at 05:29

## 2020-07-24 RX ADMIN — SODIUM CHLORIDE 2 GRAM(S): 9 INJECTION INTRAMUSCULAR; INTRAVENOUS; SUBCUTANEOUS at 12:44

## 2020-07-24 RX ADMIN — Medication 25 MILLIGRAM(S): at 12:44

## 2020-07-24 RX ADMIN — PREGABALIN 1000 MICROGRAM(S): 225 CAPSULE ORAL at 12:45

## 2020-07-24 RX ADMIN — SODIUM CHLORIDE 95 MILLILITER(S): 9 INJECTION INTRAMUSCULAR; INTRAVENOUS; SUBCUTANEOUS at 07:52

## 2020-07-24 NOTE — DISCHARGE NOTE PROVIDER - NSDCMRMEDTOKEN_GEN_ALL_CORE_FT
amLODIPine 5 mg oral tablet: 1 tab(s) orally once a day  finasteride 5 mg oral tablet: 1 tab(s) orally once a day  simvastatin 10 mg oral tablet: 1 tab(s) orally once a day (at bedtime)  tiZANidine 2 mg oral capsule: orally prn amLODIPine 5 mg oral tablet: 1 tab(s) orally once a day  finasteride 5 mg oral tablet: 1 tab(s) orally once a day  simvastatin 10 mg oral tablet: 1 tab(s) orally once a day (at bedtime)  tiZANidine 2 mg oral capsule: orally prn  valproic acid 250 mg oral capsule: 1 cap orally once a day in the morning  2 cap(s) orally once a day (at bedtime)

## 2020-07-24 NOTE — CHART NOTE - NSCHARTNOTEFT_GEN_A_CORE
Although CSF cytopath and serum encephalopathy panel show as outstanding labs in orders and not shown received,   Called and and confirmed with labs staff, stating both labs are in progress.

## 2020-07-24 NOTE — PROGRESS NOTE ADULT - ASSESSMENT
74 yo male PMHx BPH, HTN, and HLD presents to ED with intermittent left upper extremity tonic clonic activity with progression to right upper and lower extremities and multiple falls since June, concerning for seizures. Outpatient MRI brain without contrast demonstrates hyperenhancement of right limbic lobe.     # Bilateral upper extremity tonic clonic activity   # autoimmune encephalitis  # HTN  # HLD  # BPH  # lung nodules and tree in bud opacities    Appreciate neurology care  abnormal EEG  Follow up serum autoimmune workup  MRI brain signal in right temporal lobe concern for LGI1 encephalitis ro paraneoplastic process  CT chest with lung nodules ro neoplasm, will consult pulm dr. kearns  cont proscar  cont statin    PCP Dr. Tevin Ovalle    Please call [facility] with questions [phone].

## 2020-07-24 NOTE — PROGRESS NOTE ADULT - ASSESSMENT
74 y/o male with PMHx BPH, HTN, and HLD presents to ED with intermittent left upper extremity tonic clonic activity with progression to right upper and lower extremities and multiple falls since June, concerning for seizures. Outpatient MRI brain without contrast demonstrates hyperenhancement of right limbic lobe. Patient is not encephalopathic at this time.     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 Differentials also includes autoimmune encephalitis paraneoplastic encephalitis vs. other inflammatory encephalitis vs. infectious encephalitis vs. less likely focal dystonia.    Plan  [] PLEX therapy, appreciate rogelio placed by IR, total 5 sessions scheduled, first session 7/23, next session 7/25  [x] LP to send for autoimmune/paraneoplatic/infectious panel, normal cell count, protein, glucose, negative cryptococcal, HSV, PCR, normal flow cyto, pending cytopath, MBP, OGB  [] Follow up serum autoimmune workup, encephalopathy panel, paraneoplastic panel, LG1b, NMDA, voltage gated K, protein electropheresis  [x] CT chest/abdomen/pelvis with contrast as above with no acute pathology  [] home amlodipine held due to low BP and PLEX therapy, can restart if patient becomes hypertensive  [x] Repeat MRI brain with contrast, official read as above  [x] Report of outside MRI as above, read by in house neuroradiology noting enhancement in R limbic area with broad differential of autoimmune vs infectious, concerning for limbic encephalitis. C-spine MRI with chronic degenerative changes, no acute lesions noted  [x] EEG, report as above  [x] cardiology consultation for bradycardia, recs appreciated, questionable in setting of autonomic dysfunction with patient's limbic encephalitis  [x] low B12, methylmalonic, homocysteine, anti-parietal cell negative, intrinsic factor pending. IM B12 x 7 days  [x] Seizure, fall and aspiration precautions.  Avoid sleep deprivation.   [x] Patient's emergency contact is his close friend, Belinda [phone].  [] IV lorazepam 2mg IV prn for seizure activity lasting >3-5mins, >2x/hr, >3x/day, or if GTC , repeat after 1 minute (max dose 0.1 mg/kg)  [] will need follow up with pulm for intrapulmonary lymph nodes and nodules in the RML and chronic tree in bud opacities    DVT PPx: held today pending LP, restart Lovenox 40mg subq after LP    Case discussed with Dr. Justice. 74 y/o male with PMHx BPH, HTN, and HLD presents to ED with intermittent left upper extremity tonic clonic activity with progression to right upper and lower extremities and multiple falls since June, concerning for seizures. Outpatient MRI brain without contrast demonstrates hyperenhancement of right limbic lobe. Patient is not encephalopathic at this time.     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 Differentials also includes autoimmune encephalitis paraneoplastic encephalitis vs. other inflammatory encephalitis vs. infectious encephalitis vs. less likely focal dystonia.    Plan  [] PLEX therapy, appreciate rogelio placed by IR, total 5 sessions scheduled, first session 7/23, next session 7/25  [x] LP to send for autoimmune/paraneoplatic/infectious panel, normal cell count, protein, glucose, negative cryptococcal, HSV, PCR, normal flow cyto, pending cytopath, MBP, OGB  [] Follow up serum autoimmune workup, encephalopathy panel, paraneoplastic panel, LG1b, NMDA, voltage gated K, protein electropheresis  [x] CT chest/abdomen/pelvis with contrast as above with no acute pathology  [] home amlodipine held due to low BP and PLEX therapy, can restart if patient becomes hypertensive  [x] Repeat MRI brain with contrast, official read as above  [x] Report of outside MRI as above, read by in house neuroradiology noting enhancement in R limbic area with broad differential of autoimmune vs infectious, concerning for limbic encephalitis. C-spine MRI with chronic degenerative changes, no acute lesions noted  [x] EEG, report as above  [x] cardiology consultation for bradycardia, recs appreciated, questionable in setting of autonomic dysfunction with patient's limbic encephalitis  [x] low B12, methylmalonic, homocysteine, anti-parietal cell negative, intrinsic factor pending. IM B12 x 7 days  [x] Seizure, fall and aspiration precautions.  Avoid sleep deprivation.   [x] Patient's emergency contact is his close friend, Belinda [phone].  [] IV lorazepam 2mg IV prn for seizure activity lasting >3-5mins, >2x/hr, >3x/day, or if GTC , repeat after 1 minute (max dose 0.1 mg/kg)  [] will need follow up with pulm for intrapulmonary lymph nodes and nodules in the RML and chronic tree in bud opacities    DVT PPx: Lovenox 40mg subq daily    Case discussed with Dr. Justice. 76 y/o male with PMHx BPH, HTN, and HLD presents to ED with intermittent left upper extremity tonic clonic activity with progression to right upper and lower extremities and multiple falls since June, concerning for seizures. Outpatient MRI brain without contrast demonstrates hyperenhancement of right limbic lobe. Patient is not encephalopathic at this time.   Noted with perifissural nodules along right minor fissure, likely intrapulmonary lymph nodes, triangular nodule in RML, chronic tree-ib-bid opacities in RLL and scattered calcified granulomata. Due to patient's history of smoking and current encephalopathy, concern arises for neoplasm that may not be yet evident on imaging as encephalopathy symptoms may arise 2 years prior to appearance of malignancy. Highly consider follow-up with pulmonology for regular screening.    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 Differentials also includes autoimmune encephalitis paraneoplastic encephalitis vs. other inflammatory encephalitis vs. infectious encephalitis vs. less likely focal dystonia.    Plan  Likely Limbic Encephalitis  [] PLEX therapy, kendell mercado placed by IR, total 5 sessions scheduled, first session 7/23, next session 7/25  [x] LP to send for autoimmune/paraneoplatic/infectious panel, normal cell count, protein, glucose, negative cryptococcal, HSV, PCR, normal flow cyto, pending cytopath, MBP, OGB  [] Follow up serum autoimmune workup, encephalopathy panel, paraneoplastic panel, LG1b, NMDA, voltage gated K, protein electropheresis  [] home amlodipine held due to low BP and PLEX therapy, can restart if patient becomes hypertensive  [x] Repeat MRI brain with contrast, official read as above  [x] Report of outside MRI as above, read by in house neuroradiology noting enhancement in R limbic area with broad differential of autoimmune vs infectious, concerning for limbic encephalitis. C-spine MRI with chronic degenerative changes, no acute lesions noted  [x] EEG, report as above, no longer connected to EEG  [x] Seizure, fall and aspiration precautions.  Avoid sleep deprivation.   [] PT/OT    Pulmonary  [] will need follow up with pulm for intrapulmonary lymph nodes and nodules in the RML and chronic tree in bud opacities  [] spoken with [facility] about follow up    Cardiology  [x] cardiology consultation for bradycardia, recs appreciated, questionable in setting of autonomic dysfunction with patient's limbic encephalitis    Nutritional  [x] low B12, methylmalonic, homocysteine, anti-parietal cell negative, intrinsic factor pending. IM B12 x 7 days    DVT PPx: Lovenox 40mg subq daily    Case discussed with Dr. Justice. 74 y/o male with PMHx BPH, HTN, and HLD presents to ED with intermittent left upper extremity tonic clonic activity with progression to right upper and lower extremities and multiple falls since June, concerning for seizures. Outpatient MRI brain without contrast demonstrates hyperenhancement of right limbic lobe. Patient is not encephalopathic at this time.   Noted with perifissural nodules along right minor fissure, likely intrapulmonary lymph nodes, triangular nodule in RML, chronic tree-ib-bid opacities in RLL and scattered calcified granulomata. Due to patient's history of smoking and current encephalopathy, concern arises for neoplasm that may not be yet evident on imaging as encephalopathy symptoms may arise 2 years prior to appearance of malignancy. Highly consider follow-up with pulmonology for regular screening.    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 Differentials also includes autoimmune encephalitis paraneoplastic encephalitis vs. other inflammatory encephalitis vs. infectious encephalitis vs. less likely focal dystonia.    Plan  Likely Limbic Encephalitis  [] PLEX therapy, kendell mercado placed by IR, total 5 sessions scheduled, first session 7/23, next session 7/25  [x] LP to send for autoimmune/paraneoplatic/infectious panel, normal cell count, protein, glucose, negative cryptococcal, HSV, PCR, normal flow cyto, pending cytopath, MBP, OGB  [] valproate 250mg tid  [] Follow up serum autoimmune workup, encephalopathy panel, paraneoplastic panel, LG1b, NMDA, voltage gated K, protein electropheresis  [] home amlodipine held due to low BP and PLEX therapy, can restart if patient becomes hypertensive  [x] Repeat MRI brain with contrast, official read as above  [x] Report of outside MRI as above, read by in house neuroradiology noting enhancement in R limbic area with broad differential of autoimmune vs infectious, concerning for limbic encephalitis. C-spine MRI with chronic degenerative changes, no acute lesions noted  [x] EEG, report as above, no longer connected to EEG  [x] Seizure, fall and aspiration precautions.  Avoid sleep deprivation.   [] PT/OT    Pulmonary  [] will need follow up with pulm for intrapulmonary lymph nodes and nodules in the RML and chronic tree in bud opacities  [] spoken with [facility] about follow up  [] may likely need full body PET as outpatient    Cardiology  [x] cardiology consultation for bradycardia, recs appreciated, questionable in setting of autonomic dysfunction with patient's limbic encephalitis    Nutritional  [x] low B12, methylmalonic, homocysteine, anti-parietal cell negative, intrinsic factor pending. IM B12 x 7 days    DVT PPx: Lovenox 40mg subq daily    Case discussed with Dr. Justice.

## 2020-07-24 NOTE — PROGRESS NOTE ADULT - SUBJECTIVE AND OBJECTIVE BOX
Subjective: Patient seen and examined. No new events except as noted.   no cp or sob     REVIEW OF SYSTEMS:    CONSTITUTIONAL:+ weakness, fevers or chills  EYES/ENT: No visual changes;  No vertigo or throat pain   NECK: No pain or stiffness  RESPIRATORY: No cough, wheezing, hemoptysis; No shortness of breath  CARDIOVASCULAR: No chest pain or palpitations  GASTROINTESTINAL: No abdominal or epigastric pain. No nausea, vomiting, or hematemesis; No diarrhea or constipation. No melena or hematochezia.  GENITOURINARY: No dysuria, frequency or hematuria  NEUROLOGICAL: No numbness or weakness  SKIN: No itching, burning, rashes, or lesions   All other review of systems is negative unless indicated above.    MEDICATIONS:  MEDICATIONS  (STANDING):  chlorhexidine 4% Liquid 1 Application(s) Topical <User Schedule>  cyanocobalamin Injectable 1000 MICROGram(s) IntraMuscular daily  enoxaparin Injectable 40 milliGRAM(s) SubCutaneous daily  finasteride 5 milliGRAM(s) Oral daily  lidocaine 1% Injectable 20 milliLiter(s) Local Injection once  simvastatin 10 milliGRAM(s) Oral at bedtime  sodium chloride 2 Gram(s) Oral every 8 hours  sodium chloride 0.9%. 1000 milliLiter(s) (95 mL/Hr) IV Continuous <Continuous>  valproate sodium IVPB 250 milliGRAM(s) IV Intermittent three times a day      PHYSICAL EXAM:  T(C): 36.5 (07-24-20 @ 12:19), Max: 36.9 (07-23-20 @ 20:26)  HR: 56 (07-24-20 @ 12:19) (50 - 59)  BP: 101/64 (07-24-20 @ 12:19) (96/62 - 132/66)  RR: 18 (07-24-20 @ 12:19) (18 - 18)  SpO2: 97% (07-24-20 @ 12:19) (97% - 98%)  Wt(kg): --  I&O's Summary    23 Jul 2020 07:01  -  24 Jul 2020 07:00  --------------------------------------------------------  IN: 560 mL / OUT: 0 mL / NET: 560 mL    24 Jul 2020 07:01  -  24 Jul 2020 12:27  --------------------------------------------------------  IN: 260 mL / OUT: 0 mL / NET: 260 mL            Appearance: NAD	  HEENT:   Normal oral mucosa, PERRL, EOMI	  Lymphatic: No lymphadenopathy, R upper [place]   Cardiovascular: Normal S1 S2, No JVD, No murmurs, No edema  Respiratory: Lungs clear to auscultation	  Psychiatry: A & O x 3, Mood & affect appropriate  Gastrointestinal:  Soft, Non-tender, + BS	  Skin: No rashes, No ecchymoses, No cyanosis	  Extremities: Normal range of motion, No clubbing, cyanosis or edema  Vascular: Peripheral pulses palpable 2+ bilaterally  Neurologic:  EYES- non-icteric disks obscured  MENTAL STATUS- Alert, attends, fluent, non-dysarthric, follows commands, oriented, good memory and affect.?sluggish  CRANIAL NERVES-II Optic - Bilateral - Normal Visual Fields. III-IV-VI EOMI & Pupils - Bilateral - Normal Bilaterally. V Trigeminal - Bilateral - Normal bilateral facial sensation and jaw movement. VII Facial - Normal bilateral facial movement. VIII Acoustic - Bilateral - Hearing normal to voice bilaterally. IX Glossopharyngeal / X Vagus - Normal palate elevates symmetrically. XI Accessory - Normal Bilaterally. XII Hypoglossal - Tongue protrudes midline and moves symmetrically.  MOTOR-Bulk and Contour - Normal. Tone - Normal tone, no abnormal movements. Strength - 5/5 normal muscle strength - Strength full throughout.  SENSORY-Normal - LT, DSS, Vibration.  REFLEXES- DTR's 2+ throughout.  COORDINATION-Normal FFM, SAMANTHA, FNF - bilaterally.  GAIT-NA      LABS:    CARDIAC MARKERS:            07-24    131<L>  |  100  |  13  ----------------------------<  95  4.0   |  21<L>  |  0.93    Ca    8.6      24 Jul 2020 05:54      proBNP:   Lipid Profile:   HgA1c:   TSH:             TELEMETRY: 	SR SB    ECG:  	  RADIOLOGY:   DIAGNOSTIC TESTING:  [ ] Echocardiogram:  < from: Transthoracic Echocardiogram (07.22.20 @ 13:14) >    Patient name: DENA FARNSWORTH  YOB: 1945   Age: 75 (M)   MR#: [id number]  Study Date: 7/22/2020  [id number] Virginia Patterson RDCS  Study quality: Technically fair  Referring Physician: Vicky Justice MD  Blood Pressure: 105/66 mmHg  Height: 180 cm  Weight: 84 kg  BSA: 2 m2  ------------------------------------------------------------------------  PROCEDURE: Transthoracic echocardiogram with 2-D, M-Mode  and complete spectral and color flow Doppler.  INDICATION: Other cerebrovascular disease (I67.89)  ------------------------------------------------------------------------  Dimensions:    Normal Values:  LA:     3.3    2.0 - 4.0 cm  Ao:     3.4    2.0 - 3.8 cm  SEPTUM: 1.0    0.6 - 1.2 cm  PWT:    1.0    0.6 - 1.1 cm  LVIDd:  3.7    3.0 - 5.6 cm  LVIDs:  2.4    1.8 - 4.0 cm  Derived variables:  LVMI: 57 g/m2  RWT: 0.52  EF (Visual Estimate): 70 %  Doppler Peak Velocity (m/sec): AoV=1.3 TV=2.1  ------------------------------------------------------------------------  Observations:  Mitral Valve: Normal mitral valve.  Aortic Valve/Aorta: Calcified aortic valve with normal  opening.  Minimal aortic regurgitation.  Normal aortic root size.  Left Atrium: Normal left atrium.  Left Ventricle: Normal left ventricular internal dimensions  and wall thicknesses.  Normal left ventricular systolic function. No segmental  wall motion abnormalities.  Normal diastolic function.  Right Heart: Normal right atrium. Normal right ventricular  size and function.  Normal tricuspid valve. Minimal tricuspid regurgitation.  Normal pulmonic valve.  Pericardium/Pleura: Normal pericardium with no pericardial  effusion.  Hemodynamic: Estimated right atrial pressure is normal.  No evidence of pulmonary hypertension.  No PFO seen with color Doppler.  ------------------------------------------------------------------------  Conclusions:  Normal left ventricular systolic function. No segmental  wall motion abnormalities.  ------------------------------------------------------------------------  Confirmed on  7/22/2020 - 16:23:33 by Song Mcdonald MD, FASE  ------------------------------------------------------------------------    < end of copied text >    [ ]  Catheterization:  [ ] Stress Test:    OTHER:

## 2020-07-24 NOTE — PROGRESS NOTE ADULT - SUBJECTIVE AND OBJECTIVE BOX
*************************************  NEUROLOGY PROGRESS NOTE  **************************************    DENA FARNSWORTH  Male  MRN-[id number]    Subjective:  Received first PLEX session yesterday and tolerated it well. Today patient feels...    VITAL SIGNS:  Vital Signs Last 24 Hrs  T(C): 36.7 (24 Jul 2020 04:43), Max: 36.9 (23 Jul 2020 20:26)  T(F): 98.1 (24 Jul 2020 04:43), Max: 98.4 (23 Jul 2020 20:26)  HR: 50 (24 Jul 2020 04:43) (50 - 59)  BP: 120/75 (24 Jul 2020 04:43) (96/62 - 132/66)  BP(mean): --  RR: 18 (24 Jul 2020 04:43) (16 - 18)  SpO2: 98% (24 Jul 2020 04:43) (95% - 98%)    MEDICATIONS  (STANDING):  chlorhexidine 4% Liquid 1 Application(s) Topical <User Schedule>  cyanocobalamin Injectable 1000 MICROGram(s) IntraMuscular daily  enoxaparin Injectable 40 milliGRAM(s) SubCutaneous daily  finasteride 5 milliGRAM(s) Oral daily  lidocaine 1% Injectable 20 milliLiter(s) Local Injection once  simvastatin 10 milliGRAM(s) Oral at bedtime  sodium chloride 0.9%. 1000 milliLiter(s) (95 mL/Hr) IV Continuous <Continuous>    MEDICATIONS  (PRN):  LORazepam   Injectable 2 milliGRAM(s) IV Push once PRN Seizure activity  polyethylene glycol 3350 17 Gram(s) Oral daily PRN Constipation  sodium chloride 0.9% lock flush 10 milliLiter(s) IV Push every 1 hour PRN Pre/post blood products, medications, blood draw, and to maintain line patency        PHYSICAL EXAMINATION:  General: Well-developed, well nourished, in no acute distress.  Neurologic:  - Mental Status:  Alert, awake, oriented to person, place, and time; Speech is fluent but hoarse with intact naming, repetition, and comprehension. Slowed speech. Intact serial 7s, 2/3 5 minute recall. Blunted affect, hypomimic.  - Cranial Nerves II-XII:  VFF, No nystagmus or APD noted, EOMI, hypometric saccadic movements on horizontal tracking, PERRLA, V1-V3 intact, slight R nasolabial flattening, t/p midline, SCM/trap intact.  - Motor:  Strength is 5/5 throughout.  There is no pronator drift.  Normal muscle bulk. Slight rigidity in the lower extremities bilaterally R>L. Occasional dystonic movements of the R and L arms.  - Reflexes:  2+ and symmetric at the biceps, triceps, brachioradialis, knees, and ankles.  Plantar responses flexor.  - Sensory:  Intact to light touch, pin prick, and joint-position sense throughout. Decreased vibratory sense in the lower extremities bilaterally.   - Coordination:  Finger-nose-finger and heel-knee-shin intact without dysmetria.  Rapid alternating hand movements intact.  - Gait:   Deferred.    LABS:      RADIOLOGY & ADDITIONAL STUDIES:      Outside MRI report noting:   IMPRESSION:  Asymmetric FLAIR hyperintensity in the right limbic lobe involving the hippocampal gyrus. Differential diagnosis includes, but is not limited to, infectious/inflammatory encephalitis, limbic encephalitis and sequela of epilepsy/post ictal edema. Additional mild chronic microvascular changes without acute infarct.    EEG:  EEG Summary:  Abnormal EEG in the awake, drowsy and asleep states.  -  Electrographical seizures, right temporal, with spread to the left   -  Occasional sharp wave, focal, right temporal   - Bitemporal TIRDA  -  Intermittent focal slowing, bilateral temporal   -  Diffuse excess beta activity.      Impression/Clinical Correlate:    Abnormal EEG due to     1.	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	There is epileptogenic focus with structural or functional abnormality in bilateral temporal regions, right greater than left.   Diffuse excess beta activity can be seen in patient treated with sedative medications        MRI brain here as per my read noting hyperintensity in the right limbic area similar to outside MRI.   < from: MR Head w/wo IV Cont (07.22.20 @ 18:41) >  IMPRESSION:    Redemonstration of T2 and FLAIR hyperintense signal within the mesial right temporal lobe in the region of the hippocampal and parahippocampal gyri, with associated subtle swelling. Differential diagnosis includes autoimmune encephalitis, herpes encephalitis, low-grade glioblastoma, and sequelae of recentseizure.    No abnormal parenchymal or leptomeningeal enhancement.    No acute intracranial hemorrhage or acute infarction.    < end of copied text >    < from: CT Chest w/ IV Cont (07.23.20 @ 11:08) >  IMPRESSION:   No CT evidence of acute intrathoracic or intra-abdominal pathology. No solid organ lesion or lymphadenopathy.    < end of copied text >  < from: CT Chest w/ IV Cont (07.23.20 @ 11:08) >  CHEST:   LUNGS AND LARGE AIRWAYS: Patent central airways. A 0.5 cm perifissural nodule along the right minor fissure, likely intrapulmonary lymph node. Also a triangular 0.2 cm nodule in the right middle lobe (series 3, image 124), possible intrapulmonary lymph node. Chronic tree-in-bud opacities in the periphery of the right lower lobe. Scattered calcified granulomata.    < end of copied text > *************************************  NEUROLOGY PROGRESS NOTE  **************************************    DENA FARNSWORTH  Male  MRN-[id number]    Subjective:  Received first PLEX session yesterday and tolerated it well. Stated felt tired after the session yesterday. Today patient feels well with no acute complaints.     VITAL SIGNS:  Vital Signs Last 24 Hrs  T(C): 36.7 (24 Jul 2020 04:43), Max: 36.9 (23 Jul 2020 20:26)  T(F): 98.1 (24 Jul 2020 04:43), Max: 98.4 (23 Jul 2020 20:26)  HR: 50 (24 Jul 2020 04:43) (50 - 59)  BP: 120/75 (24 Jul 2020 04:43) (96/62 - 132/66)  BP(mean): --  RR: 18 (24 Jul 2020 04:43) (16 - 18)  SpO2: 98% (24 Jul 2020 04:43) (95% - 98%)    MEDICATIONS  (STANDING):  chlorhexidine 4% Liquid 1 Application(s) Topical <User Schedule>  cyanocobalamin Injectable 1000 MICROGram(s) IntraMuscular daily  enoxaparin Injectable 40 milliGRAM(s) SubCutaneous daily  finasteride 5 milliGRAM(s) Oral daily  lidocaine 1% Injectable 20 milliLiter(s) Local Injection once  simvastatin 10 milliGRAM(s) Oral at bedtime  sodium chloride 0.9%. 1000 milliLiter(s) (95 mL/Hr) IV Continuous <Continuous>    MEDICATIONS  (PRN):  LORazepam   Injectable 2 milliGRAM(s) IV Push once PRN Seizure activity  polyethylene glycol 3350 17 Gram(s) Oral daily PRN Constipation  sodium chloride 0.9% lock flush 10 milliLiter(s) IV Push every 1 hour PRN Pre/post blood products, medications, blood draw, and to maintain line patency        PHYSICAL EXAMINATION:  General: Well-developed, well nourished, in no acute distress.  Neurologic:  - Mental Status:  Alert, awake, oriented to person, place, and time; Speech is fluent but hoarse with intact naming, repetition, and comprehension. Intact serial 7s, 3/3 5 minute recall. Judgement and insight intact. Slowed speech and blunted affect. Occasional faciobrachial dystonic movements.  - Cranial Nerves II-XII:  VFF, No nystagmus or APD noted, EOMI, hypometric saccadic movements on horizontal tracking, PERRLA, V1-V3 intact, slight R nasolabial flattening, t/p midline, SCM/trap intact.  - Motor:  Strength is 5/5 throughout.  There is no pronator drift.  Normal muscle bulk. Slight rigidity in the lower extremities bilaterally R>L. Occasional dystonic movements of the R and L arms.  - Reflexes:  2+ and symmetric at the biceps, triceps, brachioradialis, knees, and ankles.  Plantar responses flexor.  - Sensory:  Intact to light touch, pin prick, and joint-position sense throughout. Decreased vibratory sense in the lower extremities bilaterally.   - Coordination:  Finger-nose-finger and heel-knee-shin intact without dysmetria.  Rapid alternating hand movements intact.  - Gait: Cautious but walks well.     LABS:      RADIOLOGY & ADDITIONAL STUDIES:      Outside MRI report noting:   IMPRESSION:  Asymmetric FLAIR hyperintensity in the right limbic lobe involving the hippocampal gyrus. Differential diagnosis includes, but is not limited to, infectious/inflammatory encephalitis, limbic encephalitis and sequela of epilepsy/post ictal edema. Additional mild chronic microvascular changes without acute infarct.    EEG:  EEG Summary:  Abnormal EEG in the awake, drowsy and asleep states.  -  Electrographical seizures, right temporal, with spread to the left   -  Occasional sharp wave, focal, right temporal   - Bitemporal TIRDA  -  Intermittent focal slowing, bilateral temporal   -  Diffuse excess beta activity.      Impression/Clinical Correlate:    Abnormal EEG due to     1.	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	There is epileptogenic focus with structural or functional abnormality in bilateral temporal regions, right greater than left.   Diffuse excess beta activity can be seen in patient treated with sedative medications        MRI brain here as per my read noting hyperintensity in the right limbic area similar to outside MRI.   < from: MR Head w/wo IV Cont (07.22.20 @ 18:41) >  IMPRESSION:    Redemonstration of T2 and FLAIR hyperintense signal within the mesial right temporal lobe in the region of the hippocampal and parahippocampal gyri, with associated subtle swelling. Differential diagnosis includes autoimmune encephalitis, herpes encephalitis, low-grade glioblastoma, and sequelae of recentseizure.    No abnormal parenchymal or leptomeningeal enhancement.    No acute intracranial hemorrhage or acute infarction.    < end of copied text >    < from: CT Chest w/ IV Cont (07.23.20 @ 11:08) >  IMPRESSION:   No CT evidence of acute intrathoracic or intra-abdominal pathology. No solid organ lesion or lymphadenopathy.    < end of copied text >  < from: CT Chest w/ IV Cont (07.23.20 @ 11:08) >  CHEST:   LUNGS AND LARGE AIRWAYS: Patent central airways. A 0.5 cm perifissural nodule along the right minor fissure, likely intrapulmonary lymph node. Also a triangular 0.2 cm nodule in the right middle lobe (series 3, image 124), possible intrapulmonary lymph node. Chronic tree-in-bud opacities in the periphery of the right lower lobe. Scattered calcified granulomata.    < end of copied text >

## 2020-07-24 NOTE — DISCHARGE NOTE PROVIDER - HOSPITAL COURSE
Shawn Alan is 76 y/o RH male with PMHx BPH, HTN, and HLD presents to ED with intermittent left upper extremity spasms and weakness since the first week of June. Patient reports that symptoms have progressed to include his right upper and lower extremity since late June. He states that in the beginning of June, he had pain below his left ear. A couple weeks later, patient started to have stiffness of his left upper extremity that felt like spasms and was accompanied by shaking. He then began to have involvement of his right side with involvement of his right upper and right lower extremity. The shaking is usually followed by weakness, which lasts a few minutes before he regains full function. The weakness in his arms has led him to break dishes multiple times. Patient states that holding a weighted object may precipitate the episodes. He does not know how often the episodes occur, but says they are very frequent when he does his daily activities. He cannot recall exactly when the last one was. The patient has had multiple falls since June, most recently a couple weeks ago that he attributes to loss of balance. He denies syncope, loss of consciousness, tongue biting, and urinary incontinence. Patient denies previous history of a neurological disorder, including seizures. Patient does not ambulate with mechanical assistance at baseline. He lives alone and says that the episodes have not been witnessed.    Patient had an MRI brain without contrast yesterday after going to a private neurologist.     Outside MRI noted: Asymmetric FLAIR hyperintensity in the right limbic lobe involving the hippocampal gyrus. Differential diagnosis includes, but is not limited to, infectious/inflammatory encephalitis, limbic encephalitis and sequela of epilepsy/post ictal edema. Additional mild chronic microvascular changes without acute infarct.        HOSPITAL COURSE:    Patient had symptoms of faciobrachial dystonic seizures with arm and face movements, flat affect, hypomimic. 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    Patient underwent an MRI at this facility which noted:    Redemonstration of T2 and FLAIR hyperintense signal within the mesial right temporal lobe in the region of the hippocampal and parahippocampal gyri, with associated subtle swelling. Differential diagnosis includes autoimmune encephalitis, herpes encephalitis, low-grade glioblastoma, and sequelae of recentseizure. No abnormal parenchymal or leptomeningeal enhancement. No acute intracranial hemorrhage or acute infarction.    EEG performed at this facility noted Electrographical seizures, right temporal, with spread to the left, Occasional sharp wave, focal, right temporal, Bitemporal TIRDA, Intermittent focal slowing, bilateral temporal, Diffuse excess beta activity. LP was performed with no signs of infection, CSF studies for encephalopathy and paraneoplastic panel pending...     Patient     Patient was started on PLEX therapy and completed a 5 session course.     CT chest/abd/pelvis was performed which noted Patent central airways. A 0.5 cm perifissural nodule along the right minor fissure, likely intrapulmonary lymph node. Also a triangular 0.2 cm nodule in the right middle lobe (series 3, image 124), possible intrapulmonary lymph node. Chronic tree-in-bud opacities in the periphery of the right lower lobe. Scattered calcified granulomata.     Concern was raised as encephalitis may precede tumor progression up to 2 years. Pulmonology was consulted through patient's internal medicine provider who recommended no further need for follow up CT for monitoring and no suspicion of active infection. During admission, patient had episodes of bradycardia which may have been in the setting of limbic encephalitis, other vitals had remained stable.        Patient improved over hospital stay and plan was discussed with him and patient was in agreement. Shawn Alan is 76 y/o RH male with PMHx BPH, HTN, and HLD presents to ED with intermittent left upper extremity spasms and weakness since the first week of June. Patient reports that symptoms have progressed to include his right upper and lower extremity since late June. He states that in the beginning of June, he had pain below his left ear. A couple weeks later, patient started to have stiffness of his left upper extremity that felt like spasms and was accompanied by shaking. He then began to have involvement of his right side with involvement of his right upper and right lower extremity. The shaking is usually followed by weakness, which lasts a few minutes before he regains full function. The weakness in his arms has led him to break dishes multiple times. Patient states that holding a weighted object may precipitate the episodes. He does not know how often the episodes occur, but says they are very frequent when he does his daily activities. He cannot recall exactly when the last one was. The patient has had multiple falls since June, most recently a couple weeks ago that he attributes to loss of balance. He denies syncope, loss of consciousness, tongue biting, and urinary incontinence. Patient denies previous history of a neurological disorder, including seizures. Patient does not ambulate with mechanical assistance at baseline. He lives alone and says that the episodes have not been witnessed.    Patient had an MRI brain without contrast yesterday after going to a private neurologist.     Outside MRI noted: Asymmetric FLAIR hyperintensity in the right limbic lobe involving the hippocampal gyrus. Differential diagnosis includes, but is not limited to, infectious/inflammatory encephalitis, limbic encephalitis and sequela of epilepsy/post ictal edema. Additional mild chronic microvascular changes without acute infarct.        HOSPITAL COURSE:    Patient had symptoms of faciobrachial dystonic seizures with arm and face movements, flat affect, hypomimic. 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    Patient underwent an MRI at this facility which noted:    Redemonstration of T2 and FLAIR hyperintense signal within the mesial right temporal lobe in the region of the hippocampal and parahippocampal gyri, with associated subtle swelling. Differential diagnosis includes autoimmune encephalitis, herpes encephalitis, low-grade glioblastoma, and sequelae of recentseizure. No abnormal parenchymal or leptomeningeal enhancement. No acute intracranial hemorrhage or acute infarction.    EEG performed at this facility noted Electrographical seizures, right temporal, with spread to the left, Occasional sharp wave, focal, right temporal, Bitemporal TIRDA, Intermittent focal slowing, bilateral temporal, Diffuse excess beta activity. LP was performed with no signs of infection, CSF studies for encephalopathy and paraneoplastic panel pending...     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         CT chest/abd/pelvis was performed which noted Patent central airways. A 0.5 cm perifissural nodule along the right minor fissure, likely intrapulmonary lymph node. Also a triangular 0.2 cm nodule in the right middle lobe (series 3, image 124), possible intrapulmonary lymph node. Chronic tree-in-bud opacities in the periphery of the right lower lobe. Scattered calcified granulomata.     Concern was raised as encephalitis may precede tumor progression up to 2 years. Pulmonology was consulted through patient's internal medicine provider who recommended no further need for follow up CT for monitoring and no suspicion of active infection. During admission, patient had episodes of bradycardia which may have been in the setting of limbic encephalitis, other vitals had remained stable.        Patient improved over hospital stay and plan was discussed with him and patient was in agreement. Shawn Alan is 76 y/o RH male with PMHx BPH, HTN, and HLD presents to ED with intermittent left upper extremity spasms and weakness since the first week of June. Patient reports that symptoms have progressed to include his right upper and lower extremity since late June. He states that in the beginning of June, he had pain below his left ear. A couple weeks later, patient started to have stiffness of his left upper extremity that felt like spasms and was accompanied by shaking. He then began to have involvement of his right side with involvement of his right upper and right lower extremity. The shaking is usually followed by weakness, which lasts a few minutes before he regains full function. The weakness in his arms has led him to break dishes multiple times. Patient states that holding a weighted object may precipitate the episodes. He does not know how often the episodes occur, but says they are very frequent when he does his daily activities. He cannot recall exactly when the last one was. The patient has had multiple falls since June, most recently a couple weeks ago that he attributes to loss of balance. He denies syncope, loss of consciousness, tongue biting, and urinary incontinence. Patient denies previous history of a neurological disorder, including seizures. Patient does not ambulate with mechanical assistance at baseline. He lives alone and says that the episodes have not been witnessed.    Patient had an MRI brain without contrast yesterday after going to a private neurologist.     Outside MRI noted: Asymmetric FLAIR hyperintensity in the right limbic lobe involving the hippocampal gyrus. Differential diagnosis includes, but is not limited to, infectious/inflammatory encephalitis, limbic encephalitis and sequela of epilepsy/post ictal edema. Additional mild chronic microvascular changes without acute infarct.        HOSPITAL COURSE:    Patient had symptoms of faciobrachial dystonic seizures with arm and face movements, flat affect, hypomimic. 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    Patient underwent an MRI at this facility which noted:    Redemonstration of T2 and FLAIR hyperintense signal within the mesial right temporal lobe in the region of the hippocampal and parahippocampal gyri, with associated subtle swelling. Differential diagnosis includes autoimmune encephalitis, herpes encephalitis, low-grade glioblastoma, and sequelae of recentseizure. No abnormal parenchymal or leptomeningeal enhancement. No acute intracranial hemorrhage or acute infarction.    EEG performed at this facility noted Electrographical seizures, right temporal, with spread to the left, Occasional sharp wave, focal, right temporal, Bitemporal TIRDA, Intermittent focal slowing, bilateral temporal, Diffuse excess beta activity. LP was performed with no signs of infection, CSF studies for encephalopathy and paraneoplastic panel pending...     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 He had an infusion of Rituxan on 8/3/20, and Solumedrol 1 gram was infused intravenously on 8/3 and again on 8/4        CT chest/abd/pelvis was performed which noted Patent central airways. A 0.5 cm perifissural nodule along the right minor fissure, likely intrapulmonary lymph node. Also a triangular 0.2 cm nodule in the right middle lobe (series 3, image 124), possible intrapulmonary lymph node. Chronic tree-in-bud opacities in the periphery of the right lower lobe. Scattered calcified granulomata.     Concern was raised as encephalitis may precede tumor progression up to 2 years. Pulmonology was consulted through patient's internal medicine provider who recommended no further need for follow up CT for monitoring and no suspicion of active infection. During admission, patient had episodes of bradycardia which may have been in the setting of limbic encephalitis, other vitals had remained stable.        Patient improved over hospital stay and plan was discussed with him and patient was in agreement.

## 2020-07-24 NOTE — PROGRESS NOTE ADULT - ATTENDING COMMENTS
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 will continue every other day for 5 rounds  his dystonic sz improved after first PLEX  his mood improved as well  start  TID and continue PLEX  his chest CT showed nonspecific calcifications    He will need whole body PET scan as outpatient due to the fact that AIE/ LG1 CAN PRECEDE NEOPLASIA BY 2 YEARS.    off EEG for now

## 2020-07-24 NOTE — PROGRESS NOTE ADULT - SUBJECTIVE AND OBJECTIVE BOX
Patient is a 75y old  Male who presents with a chief complaint of limbic encephalitis (24 Jul 2020 12:27)      SUBJECTIVE / OVERNIGHT EVENTS:    Patient seen and examined. denies complaints. having uncontrolled movements UE.      Vital Signs Last 24 Hrs  T(C): 36.5 (24 Jul 2020 12:19), Max: 36.9 (23 Jul 2020 20:26)  T(F): 97.7 (24 Jul 2020 12:19), Max: 98.4 (23 Jul 2020 20:26)  HR: 56 (24 Jul 2020 12:19) (50 - 59)  BP: 101/64 (24 Jul 2020 12:19) (96/62 - 132/66)  BP(mean): --  RR: 18 (24 Jul 2020 12:19) (18 - 18)  SpO2: 97% (24 Jul 2020 12:19) (97% - 98%)  I&O's Summary    23 Jul 2020 07:01  -  24 Jul 2020 07:00  --------------------------------------------------------  IN: 560 mL / OUT: 0 mL / NET: 560 mL    24 Jul 2020 07:01  -  24 Jul 2020 12:42  --------------------------------------------------------  IN: 260 mL / OUT: 0 mL / NET: 260 mL        PE:  GENERAL: NAD, AAOx3  HEAD:  Atraumatic, Normocephalic  CHEST/LUNG: CTABL, No wheeze  HEART: Regular rate and rhythm; no murmur  ABDOMEN: Soft, Nontender, Nondistended; Bowel sounds present  EXTREMITIES:  2+ Peripheral Pulses, No clubbing, cyanosis, or edema  SKIN: No rashes or lesions, R neck CVC  NEURO: No focal deficits    LABS:    07-24    131<L>  |  100  |  13  ----------------------------<  95  4.0   |  21<L>  |  0.93    Ca    8.6      24 Jul 2020 05:54        CAPILLARY BLOOD GLUCOSE                RADIOLOGY & ADDITIONAL TESTS:    Imaging Personally Reviewed:  [x] YES  [ ] NO    Consultant(s) Notes Reviewed:  [x] YES  [ ] NO    MEDICATIONS  (STANDING):  chlorhexidine 4% Liquid 1 Application(s) Topical <User Schedule>  cyanocobalamin Injectable 1000 MICROGram(s) IntraMuscular daily  enoxaparin Injectable 40 milliGRAM(s) SubCutaneous daily  finasteride 5 milliGRAM(s) Oral daily  lidocaine 1% Injectable 20 milliLiter(s) Local Injection once  simvastatin 10 milliGRAM(s) Oral at bedtime  sodium chloride 2 Gram(s) Oral every 8 hours  sodium chloride 0.9%. 1000 milliLiter(s) (95 mL/Hr) IV Continuous <Continuous>  valproate sodium IVPB 250 milliGRAM(s) IV Intermittent three times a day    MEDICATIONS  (PRN):  LORazepam   Injectable 2 milliGRAM(s) IV Push once PRN Seizure activity  polyethylene glycol 3350 17 Gram(s) Oral daily PRN Constipation  sodium chloride 0.9% lock flush 10 milliLiter(s) IV Push every 1 hour PRN Pre/post blood products, medications, blood draw, and to maintain line patency      Care Discussed with Consultants/Other Providers [x] YES  [ ] NO    HEALTH ISSUES - PROBLEM Dx:  Limbic encephalitis: Limbic encephalitis  Bradycardia: Bradycardia

## 2020-07-24 NOTE — DISCHARGE NOTE PROVIDER - NSDCCPCAREPLAN_GEN_ALL_CORE_FT
PRINCIPAL DISCHARGE DIAGNOSIS  Diagnosis: Limbic encephalitis  Assessment and Plan of Treatment: You were admitted for abnormal movements of your arms and face. You were found to have abnormalities on your MRI which are likely causing the abnormal movements stemming from an autoimmune inflammation of your brain. You were treated with plasma exchange therapy and improved over your hospital stay. Please follow up with neurologist in 1 week. Continue taking medications as prescribed.  Limit alcohol to minimum and do not smoke.  If you experience any symptoms of facial drooping, slurred speech, arm or leg weakness, severe headache, vision changes or any worsening symptoms, notify provider immediatley and return to ER.        SECONDARY DISCHARGE DIAGNOSES  Diagnosis: Upper extremity dysfunction  Assessment and Plan of Treatment:

## 2020-07-24 NOTE — DISCHARGE NOTE PROVIDER - NSDCFUSCHEDAPPT_GEN_ALL_CORE_FT
DENA FARNSWORTH ; 09/02/2020 ; [facility] [address] [facility] [facility] ; 09/02/2020 ; [facility] [address] [facility] ; 09/02/2020 ; [facility] [address] [facility] ; 09/02/2020 ; [facility] [address] ; 09/02/2020 ; [facility] [address] [facility] [facility] ; 09/02/2020 ; [facility] [address] [facility]

## 2020-07-24 NOTE — DISCHARGE NOTE PROVIDER - CARE PROVIDER_API CALL
Melvina Scott  NEUROLOGY  [address] [place] [address]  Phone: [phone]  Fax: [phone]  Follow Up Time:     ARMAAN MAGUIRE  Internal Medicine  [address] [place] [address]  Phone: ([phone]  Fax: ([phone]  Follow Up Time:

## 2020-07-24 NOTE — CONSULT NOTE ADULT - SUBJECTIVE AND OBJECTIVE BOX
PULMONARY CONSULT  David Asif MD  [phone]    Initial HPI on admission:  HPI:  HPI:  76 y/o RH male with PMHx BPH, HTN, and HLD presents to ED with intermittent left upper extremity spasms and weakness since the first week of June. Patient reports that symptoms have progressed to include his right upper and lower extremity since late June. He states that in the beginning of June, he had pain below his left ear. A couple weeks later, patient started to have stiffness of his left upper extremity that felt like spasms and was accompanied by shaking. He then began to have involvement of his right side with involvement of his right upper and right lower extremity. The shaking is usually followed by weakness, which lasts a few minutes before he regains full function. The weakness in his arms has led him to break dishes multiple times. Patient states that holding a weighted object may precipitate the episodes. He does not know how often the episodes occur, but says they are very frequent when he does his daily activities. He cannot recall exactly when the last one was. The patient has had multiple falls since June, most recently a couple weeks ago that he attributes to loss of balance. He denies syncope, loss of consciousness, tongue biting, and urinary incontinence. Patient denies previous history of a neurological disorder, including seizures. Patient does not ambulate with mechanical assistance at baseline. He lives alone and says that the episodes have not been witnessed. Patient had an MRI brain without contrast yesterday after going to a private neurologist. He states that his friend Belinda ([phone]) will bring in CD with images to be reviewed. Patient denies weight loss, fevers, chills, abdominal pain, cough, chest pain, nausea, vomiting, numbness, tingling, headaches, vision changes, sick contacts, confusion, memory difficulties, change in behavior/personality, and sleep disturbances.    Asked to evaluate lung nodules and tree-in-bud opacities noted on CT chest  CT reviewed: 2 small triangular nodular opacities (2mm, and 5 mm) which are c/w intrapulmonary LN: no suspicion of malignancy and normal finding. Some R basilar scattered tree in bud opacities - indterminate, including microaspiration, mucoid impaction, post viral URI. No clinical suspicion of pneumonia or active pulm infection    Home medications:  Finasteride 5mg daily  Amlodipine 5mg daily  Simvastatin 10mg nightly  Tizanidine 2mg at bedtime PRN    PAST MEDICAL & SURGICAL HISTORY:  HLD (hyperlipidemia)  BPH (benign prostatic hyperplasia)  No significant past surgical history    Allergies  No Known Allergies    SHx - No smoking, No ETOH, No drug abuse    Review of Systems:  CONSTITUTIONAL:   HEENT:  No visual loss, blurred vision, double vision.  No hearing loss, sneezing, congestion, runny nose or sore throat.  SKIN:  No rash or itching.  CARDIOVASCULAR:  No chest pain, chest pressure or chest discomfort. No palpitations or edema.  RESPIRATORY:  No shortness of breath, cough or sputum.  GASTROINTESTINAL:  No anorexia, nausea, vomiting or diarrhea. No abdominal pain.  GENITOURINARY:  No dysuria. No increased frequency. No retention. No incontinence.  NEUROLOGICAL:  See HPI  MUSCULOSKELETAL:  No muscle, back pain, joint pain or stiffness. (21 Jul 2020 09:54)      PAST MEDICAL & SURGICAL HISTORY:  Hypertension  HLD (hyperlipidemia)  BPH (benign prostatic hyperplasia)  No significant past surgical history    FAMILY HISTORY:    SH: non smoker    Review of Systems: HEENT:  No visual loss, blurred vision, double vision.  No hearing loss, sneezing, congestion, runny nose or sore throat.  SKIN:  No rash or itching.  CARDIOVASCULAR:  No chest pain, chest pressure or chest discomfort. No palpitations or edema.  RESPIRATORY:  No shortness of breath, cough or sputum.  GASTROINTESTINAL:  No anorexia, nausea, vomiting or diarrhea. No abdominal pain.  GENITOURINARY:  NO dysuria. No increased frequency. No retention. No incontinence.  NEUROLOGICAL:  See HPI  MUSCULOSKELETAL:  No muscle, back pain, joint pain or stiffness.  HEMATOLOGIC:  No anemia, bleeding or bruising   ENDOCRINOLOGIC:  No reports of sweating, cold or heat intolerance. No polyuria or polydipsia.	      Allergies and Intolerances:        Allergies:  	No Known Allergies:       Medications:  MEDICATIONS  (STANDING):  chlorhexidine 4% Liquid 1 Application(s) Topical <User Schedule>  cyanocobalamin Injectable 1000 MICROGram(s) IntraMuscular daily  enoxaparin Injectable 40 milliGRAM(s) SubCutaneous daily  finasteride 5 milliGRAM(s) Oral daily  lidocaine 1% Injectable 20 milliLiter(s) Local Injection once  simvastatin 10 milliGRAM(s) Oral at bedtime  sodium chloride 2 Gram(s) Oral every 8 hours  sodium chloride 0.9%. 1000 milliLiter(s) (95 mL/Hr) IV Continuous <Continuous>  valproate sodium IVPB 250 milliGRAM(s) IV Intermittent three times a day    MEDICATIONS  (PRN):  LORazepam   Injectable 2 milliGRAM(s) IV Push once PRN Seizure activity  polyethylene glycol 3350 17 Gram(s) Oral daily PRN Constipation  sodium chloride 0.9% lock flush 10 milliLiter(s) IV Push every 1 hour PRN Pre/post blood products, medications, blood draw, and to maintain line patency    Vital Signs Last 24 Hrs  T(C): 36.5 (24 Jul 2020 12:19), Max: 36.9 (23 Jul 2020 20:26)  T(F): 97.7 (24 Jul 2020 12:19), Max: 98.4 (23 Jul 2020 20:26)  HR: 56 (24 Jul 2020 12:19) (50 - 59)  BP: 101/64 (24 Jul 2020 12:19) (96/62 - 132/66)  BP(mean): --  RR: 18 (24 Jul 2020 12:19) (18 - 18)  SpO2: 97% (24 Jul 2020 12:19) (97% - 98%)          07-23 @ 07:01  -  07-24 @ 07:00  --------------------------------------------------------  IN: 560 mL / OUT: 0 mL / NET: 560 mL      LABS:    07-24    131<L>  |  100  |  13  ----------------------------<  95  4.0   |  21<L>  |  0.93    Ca    8.6      24 Jul 2020 05:54    TPro  x   /  Alb  3346<L>  /  TBili  x   /  DBili  x   /  AST  x   /  ALT  x   /  AlkPhos  x   07-23      CULTURES:  Culture Results:   No growth (07-23 @ 11:01)  Culture Results:   Testing in progress (07-23 @ 11:01)      Physical Examination:    General: Non toxic, No acute distress.      HEENT: Pupils equal, reactive to light.  Symmetric.    PULM: Clear without wheeze or rhonchi    CVS: Regular rate and rhythm, no murmurs, rubs, or gallops    ABD: Soft, nondistended, nontender, normoactive bowel sounds, no masses    EXT: No edema, nontender    SKIN: Warm and well perfused, no rashes noted.    NEURO: alert, depressed affect, L arm weakness    RADIOLOGY REVIEWED PERSONALLY  CXR:    CT chest:    PROCEDURE DATE:  07/23/2020        INTERPRETATION:  CLINICAL INFORMATION: LImbic encephalitis. Assess for malignancy.    COMPARISON: None.    PROCEDURE:   CT of the Chest, Abdomen and Pelvis was performed with intravenous contrast.   Intravenous contrast: 90 ml Omnipaque 350. 10 ml discarded.  Oral contrast: None.  Sagittal and coronal reformats were performed.    FINDINGS:  CHEST:   LUNGS AND LARGE AIRWAYS: Patent central airways. A 0.5 cm perifissural nodule along the right minor fissure, likely intrapulmonary lymph node. Also a triangular 0.2 cm nodule in the right middle lobe (series 3, image 124), possible intrapulmonary lymph node. Chronic tree-in-bud opacities in the periphery of the right lower lobe. Scattered calcified granulomata.  PLEURA: No pleural effusion.  VESSELS: Thoracic aorta and main pulmonary artery are normal in caliber.  HEART: Heart size is normal. No pericardial effusion. Aortic root and coronary artery atherosclerotic calcifications.  MEDIASTINUM AND JOE: No lymphadenopathy.  CHEST WALL AND LOWER NECK: A right-sided central line terminates in the SVC.    ABDOMEN AND PELVIS:  LIVER: Within normal limits.  BILE DUCTS: Normal caliber.  GALLBLADDER: Within normal limits.  SPLEEN: Within normal limits.  PANCREAS: Within normal limits.  ADRENALS: Within normal limits.  KIDNEYS/URETERS: Within normal limits.    BLADDER: Trabeculated urinary bladder, likely on the basis of chronic bladder outlet obstruction.  REPRODUCTIVE ORGANS: Prostatomegaly measuring 5.4 cm in transverse dimension.    BOWEL: Colonic diverticulosis, concentrated in the sigmoid, without acute diverticulitis. No bowel wall thickening or obstruction. Moderate stool throughout the colon. Appendix is normal.  PERITONEUM: No ascites.  VESSELS: Atherosclerotic change. Duplicated IVC.  RETROPERITONEUM/LYMPH NODES: No lymphadenopathy.    ABDOMINAL WALL: Within normal limits.  BONES: Degenerative changes.    IMPRESSION:   No CT evidence of acute intrathoracic or intra-abdominal pathology. No solid organ lesion or lymphadenopathy.    TTE:

## 2020-07-25 LAB
ANION GAP SERPL CALC-SCNC: 10 MMOL/L — SIGNIFICANT CHANGE UP (ref 5–17)
BLD GP AB SCN SERPL QL: NEGATIVE — SIGNIFICANT CHANGE UP
BUN SERPL-MCNC: 13 MG/DL — SIGNIFICANT CHANGE UP (ref 7–23)
CA-I BLD-SCNC: 1.18 MMOL/L — SIGNIFICANT CHANGE UP (ref 1.12–1.3)
CALCIUM SERPL-MCNC: 9 MG/DL — SIGNIFICANT CHANGE UP (ref 8.4–10.5)
CHLORIDE SERPL-SCNC: 100 MMOL/L — SIGNIFICANT CHANGE UP (ref 96–108)
CO2 SERPL-SCNC: 25 MMOL/L — SIGNIFICANT CHANGE UP (ref 22–31)
CREAT SERPL-MCNC: 0.97 MG/DL — SIGNIFICANT CHANGE UP (ref 0.5–1.3)
FIBRINOGEN PPP-MCNC: 500 MG/DL — SIGNIFICANT CHANGE UP (ref 290–520)
GLUCOSE SERPL-MCNC: 95 MG/DL — SIGNIFICANT CHANGE UP (ref 70–99)
HCT VFR BLD CALC: 42.4 % — SIGNIFICANT CHANGE UP (ref 39–50)
HGB BLD-MCNC: 14.1 G/DL — SIGNIFICANT CHANGE UP (ref 13–17)
MCHC RBC-ENTMCNC: 29.5 PG — SIGNIFICANT CHANGE UP (ref 27–34)
MCHC RBC-ENTMCNC: 33.3 GM/DL — SIGNIFICANT CHANGE UP (ref 32–36)
MCV RBC AUTO: 88.7 FL — SIGNIFICANT CHANGE UP (ref 80–100)
NRBC # BLD: 0 /100 WBCS — SIGNIFICANT CHANGE UP (ref 0–0)
PLATELET # BLD AUTO: 204 K/UL — SIGNIFICANT CHANGE UP (ref 150–400)
POTASSIUM SERPL-MCNC: 4.3 MMOL/L — SIGNIFICANT CHANGE UP (ref 3.5–5.3)
POTASSIUM SERPL-SCNC: 4.3 MMOL/L — SIGNIFICANT CHANGE UP (ref 3.5–5.3)
RBC # BLD: 4.78 M/UL — SIGNIFICANT CHANGE UP (ref 4.2–5.8)
RBC # FLD: 13.1 % — SIGNIFICANT CHANGE UP (ref 10.3–14.5)
RH IG SCN BLD-IMP: POSITIVE — SIGNIFICANT CHANGE UP
SODIUM SERPL-SCNC: 135 MMOL/L — SIGNIFICANT CHANGE UP (ref 135–145)
WBC # BLD: 7.81 K/UL — SIGNIFICANT CHANGE UP (ref 3.8–10.5)
WBC # FLD AUTO: 7.81 K/UL — SIGNIFICANT CHANGE UP (ref 3.8–10.5)

## 2020-07-25 PROCEDURE — 36514 APHERESIS PLASMA: CPT

## 2020-07-25 PROCEDURE — 99233 SBSQ HOSP IP/OBS HIGH 50: CPT

## 2020-07-25 RX ADMIN — CHLORHEXIDINE GLUCONATE 1 APPLICATION(S): 213 SOLUTION TOPICAL at 05:33

## 2020-07-25 RX ADMIN — PREGABALIN 1000 MICROGRAM(S): 225 CAPSULE ORAL at 12:30

## 2020-07-25 RX ADMIN — SODIUM CHLORIDE 2 GRAM(S): 9 INJECTION INTRAMUSCULAR; INTRAVENOUS; SUBCUTANEOUS at 12:30

## 2020-07-25 RX ADMIN — SIMVASTATIN 10 MILLIGRAM(S): 20 TABLET, FILM COATED ORAL at 21:24

## 2020-07-25 RX ADMIN — Medication 25 MILLIGRAM(S): at 21:24

## 2020-07-25 RX ADMIN — SODIUM CHLORIDE 2 GRAM(S): 9 INJECTION INTRAMUSCULAR; INTRAVENOUS; SUBCUTANEOUS at 05:33

## 2020-07-25 RX ADMIN — Medication 25 MILLIGRAM(S): at 12:29

## 2020-07-25 RX ADMIN — FINASTERIDE 5 MILLIGRAM(S): 5 TABLET, FILM COATED ORAL at 12:31

## 2020-07-25 RX ADMIN — SODIUM CHLORIDE 2 GRAM(S): 9 INJECTION INTRAMUSCULAR; INTRAVENOUS; SUBCUTANEOUS at 21:24

## 2020-07-25 RX ADMIN — Medication 25 MILLIGRAM(S): at 05:33

## 2020-07-25 RX ADMIN — ENOXAPARIN SODIUM 40 MILLIGRAM(S): 100 INJECTION SUBCUTANEOUS at 12:30

## 2020-07-25 NOTE — PROGRESS NOTE ADULT - ASSESSMENT
74 y/o male with PMHx BPH, HTN, and HLD presents to ED with intermittent left upper extremity tonic clonic activity with progression to right upper and lower extremities and multiple falls since June, concerning for seizures. Outpatient MRI brain without contrast demonstrates hyperenhancement of right limbic lobe.     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 Differentials also includes autoimmune encephalitis paraneoplastic encephalitis vs. other inflammatory encephalitis vs. infectious encephalitis vs. less likely focal dystonia.    Plan  [] PLEX therapy, kendell mercado placed by IR, total 5 sessions scheduled, first session 7/23, next session 7/25  [] valproate 250mg tid  [] Follow up serum autoimmune workup, encephalopathy panel, paraneoplastic panel, LG1b, NMDA, voltage gated K, protein electropheresis  [] home amlodipine held due to low BP and PLEX therapy, can restart if patient becomes hypertensive  [x] Repeat MRI brain with contrast, official read as above  [x] Report of outside MRI as above, read by in house neuroradiology noting enhancement in R limbic area with broad differential of autoimmune vs infectious, concerning for limbic encephalitis. C-spine MRI with chronic degenerative changes, no acute lesions noted  [x] EEG, report as above, no longer connected to EEG  [] PT/OT

## 2020-07-25 NOTE — PROGRESS NOTE ADULT - SUBJECTIVE AND OBJECTIVE BOX
Neurology Progress    DENA FARNSWORTH75yMale    HPI:  HPI:  74 y/o RH male with PMHx BPH, HTN, and HLD presents to ED with intermittent left upper extremity spasms and weakness since the first week of June. Patient reports that symptoms have progressed to include his right upper and lower extremity since late June. He states that in the beginning of June, he had pain below his left ear. A couple weeks later, patient started to have stiffness of his left upper extremity that felt like spasms and was accompanied by shaking. He then began to have involvement of his right side with involvement of his right upper and right lower extremity. The shaking is usually followed by weakness, which lasts a few minutes before he regains full function. The weakness in his arms has led him to break dishes multiple times. Patient states that holding a weighted object may precipitate the episodes. He does not know how often the episodes occur, but says they are very frequent when he does his daily activities. He cannot recall exactly when the last one was. The patient has had multiple falls since June, most recently a couple weeks ago that he attributes to loss of balance. He denies syncope, loss of consciousness, tongue biting, and urinary incontinence. Patient denies previous history of a neurological disorder, including seizures. Patient does not ambulate with mechanical assistance at baseline. He lives alone and says that the episodes have not been witnessed.  Patient had an MRI brain without contrast yesterday after going to a private neurologist. He states that his friend Belinda ([phone]) will bring in CD with images to be reviewed.    Patient denies weight loss, fevers, chills, abdominal pain, cough, chest pain, nausea, vomiting, numbness, tingling, headaches, vision changes, sick contacts, confusion, memory difficulties, change in behavior/personality, and sleep disturbances.    Home medications:  Finasteride 5mg daily  Amlodipine 5mg daily  Simvastatin 10mg nightly  Tizanidine 2mg at bedtime PRN    PAST MEDICAL & SURGICAL HISTORY:  HLD (hyperlipidemia)  BPH (benign prostatic hyperplasia)  No significant past surgical history    Allergies  No Known Allergies    SHx - No smoking, No ETOH, No drug abuse    Review of Systems:  CONSTITUTIONAL:   HEENT:  No visual loss, blurred vision, double vision.  No hearing loss, sneezing, congestion, runny nose or sore throat.  SKIN:  No rash or itching.  CARDIOVASCULAR:  No chest pain, chest pressure or chest discomfort. No palpitations or edema.  RESPIRATORY:  No shortness of breath, cough or sputum.  GASTROINTESTINAL:  No anorexia, nausea, vomiting or diarrhea. No abdominal pain.  GENITOURINARY:  No dysuria. No increased frequency. No retention. No incontinence.  NEUROLOGICAL:  See HPI  MUSCULOSKELETAL:  No muscle, back pain, joint pain or stiffness. (21 Jul 2020 09:54)      Past Medical History  Hypertension  HLD (hyperlipidemia)  BPH (benign prostatic hyperplasia)      Past Surgical History  No significant past surgical history      MEDICATIONS    chlorhexidine 4% Liquid 1 Application(s) Topical <User Schedule>  cyanocobalamin Injectable 1000 MICROGram(s) IntraMuscular daily  enoxaparin Injectable 40 milliGRAM(s) SubCutaneous daily  finasteride 5 milliGRAM(s) Oral daily  lidocaine 1% Injectable 20 milliLiter(s) Local Injection once  LORazepam   Injectable 2 milliGRAM(s) IV Push once PRN  polyethylene glycol 3350 17 Gram(s) Oral daily PRN  simvastatin 10 milliGRAM(s) Oral at bedtime  sodium chloride 2 Gram(s) Oral every 8 hours  sodium chloride 0.9% lock flush 10 milliLiter(s) IV Push every 1 hour PRN  sodium chloride 0.9%. 1000 milliLiter(s) IV Continuous <Continuous>  valproate sodium IVPB 250 milliGRAM(s) IV Intermittent three times a day         Family history: No history of dementia, strokes, or seizures   FAMILY HISTORY:    SOCIAL HISTORY -- No history of tobacco or alcohol use     Allergies    No Known Allergies    Intolerances            Vital Signs Last 24 Hrs  T(C): 36.6 (25 Jul 2020 07:34), Max: 36.7 (24 Jul 2020 15:16)  T(F): 97.9 (25 Jul 2020 07:34), Max: 98.1 (24 Jul 2020 15:16)  HR: 51 (25 Jul 2020 07:34) (50 - 59)  BP: 132/80 (25 Jul 2020 07:34) (101/64 - 139/79)  BP(mean): --  RR: 18 (25 Jul 2020 07:34) (18 - 18)  SpO2: 99% (25 Jul 2020 07:34) (95% - 99%)        On Neurological Examination:    Mental Status - Patient is alert, awake, oriented X3. .   Follows commands well and able to answer questions appropriately. Mood and affect  normal  Follow simple commands able to repeat  able to name.  Speech - Fluent no Dysarthria  no  Aphasia                              Cranial Nerves - Extraocular muscle intact  DANIKA Facial symmetry Tongue midline, CnV1to V3 intact gross hearing intact      Motor Exam -   Right upper  5/5 throughout  Left upper 5/5 throughtout  Right lower- 5/5 throughout  Left lower 5/5 throughout  Coordination -finger to nose intact  Muscle tone - is normal all over. No asymmetry is seen.      Sensory    Bilateral intact to light touch    Gait -  normal  no ataxia     GENERAL Exam:     Nontoxic , No Acute Distress   	  HEENT:  normocephalic, atraumatic  		  LUNGS:	Clear bilaterally  No Wheeze      VASCULAR: no carotid brui  	  HEART:	 Normal S1S2   No murmur RRR        	  MUSCULOSKELETAL: Normal Range of Motion  	   SKIN:      Normal   No Ecchymosis               LABS:  CBC Full  -  ( 25 Jul 2020 06:30 )  WBC Count : 7.81 K/uL  RBC Count : 4.78 M/uL  Hemoglobin : 14.1 g/dL  Hematocrit : 42.4 %  Platelet Count - Automated : 204 K/uL  Mean Cell Volume : 88.7 fl  Mean Cell Hemoglobin : 29.5 pg  Mean Cell Hemoglobin Concentration : 33.3 gm/dL  Auto Neutrophil # : x  Auto Lymphocyte # : x  Auto Monocyte # : x  Auto Eosinophil # : x  Auto Basophil # : x  Auto Neutrophil % : x  Auto Lymphocyte % : x  Auto Monocyte % : x  Auto Eosinophil % : x  Auto Basophil % : x      07-25    135  |  100  |  13  ----------------------------<  95  4.3   |  25  |  0.97    Ca    9.0      25 Jul 2020 06:28    TPro  x   /  Alb  3346<L>  /  TBili  x   /  DBili  x   /  AST  x   /  ALT  x   /  AlkPhos  x   07-23    Hemoglobin A1C:     LIVER FUNCTIONS - ( 23 Jul 2020 13:53 )  Alb: 3346 mg/dL / Pro: x     / ALK PHOS: x     / ALT: x     / AST: x     / GGT: x           Vitamin B12         RADIOLOGY    EKG      IMPRESSION  This is a  year old           schoenberg

## 2020-07-25 NOTE — PROGRESS NOTE ADULT - SUBJECTIVE AND OBJECTIVE BOX
Subjective: Patient seen and examined. No new events except as noted.     REVIEW OF SYSTEMS:    CONSTITUTIONAL:+ weakness, fevers or chills  EYES/ENT: No visual changes;  No vertigo or throat pain   NECK: No pain or stiffness  RESPIRATORY: No cough, wheezing, hemoptysis; No shortness of breath  CARDIOVASCULAR: No chest pain or palpitations  GASTROINTESTINAL: No abdominal or epigastric pain. No nausea, vomiting, or hematemesis; No diarrhea or constipation. No melena or hematochezia.  GENITOURINARY: No dysuria, frequency or hematuria  NEUROLOGICAL: No numbness or weakness  SKIN: No itching, burning, rashes, or lesions   All other review of systems is negative unless indicated above.    MEDICATIONS:  MEDICATIONS  (STANDING):  chlorhexidine 4% Liquid 1 Application(s) Topical <User Schedule>  cyanocobalamin Injectable 1000 MICROGram(s) IntraMuscular daily  enoxaparin Injectable 40 milliGRAM(s) SubCutaneous daily  finasteride 5 milliGRAM(s) Oral daily  lidocaine 1% Injectable 20 milliLiter(s) Local Injection once  simvastatin 10 milliGRAM(s) Oral at bedtime  sodium chloride 2 Gram(s) Oral every 8 hours  sodium chloride 0.9%. 1000 milliLiter(s) (95 mL/Hr) IV Continuous <Continuous>  valproate sodium IVPB 250 milliGRAM(s) IV Intermittent three times a day      PHYSICAL EXAM:  T(C): 36.3 (07-25-20 @ 19:19), Max: 36.7 (07-24-20 @ 23:23)  HR: 59 (07-25-20 @ 19:19) (50 - 60)  BP: 136/80 (07-25-20 @ 19:19) (103/63 - 139/79)  RR: 18 (07-25-20 @ 19:19) (18 - 18)  SpO2: 98% (07-25-20 @ 19:19) (97% - 99%)  Wt(kg): --  I&O's Summary    24 Jul 2020 07:01  -  25 Jul 2020 07:00  --------------------------------------------------------  IN: 260 mL / OUT: 0 mL / NET: 260 mL    25 Jul 2020 07:01  -  25 Jul 2020 22:31  --------------------------------------------------------  IN: 200 mL / OUT: 0 mL / NET: 200 mL            Appearance: NAD	  HEENT:   Normal oral mucosa, PERRL, EOMI	  Lymphatic: No lymphadenopathy, R upper [place]   Cardiovascular: Normal S1 S2, No JVD, No murmurs, No edema  Respiratory: Lungs clear to auscultation	  Psychiatry: A & O x 3, Mood & affect appropriate  Gastrointestinal:  Soft, Non-tender, + BS	  Skin: No rashes, No ecchymoses, No cyanosis	  Extremities: Normal range of motion, No clubbing, cyanosis or edema  Vascular: Peripheral pulses palpable 2+ bilaterally  Neurologic:  EYES- non-icteric disks obscured  MENTAL STATUS- Alert, attends, fluent, non-dysarthric, follows commands, oriented, good memory and affect.?sluggish  CRANIAL NERVES-II Optic - Bilateral - Normal Visual Fields. III-IV-VI EOMI & Pupils - Bilateral - Normal Bilaterally. V Trigeminal - Bilateral - Normal bilateral facial sensation and jaw movement. VII Facial - Normal bilateral facial movement. VIII Acoustic - Bilateral - Hearing normal to voice bilaterally. IX Glossopharyngeal / X Vagus - Normal palate elevates symmetrically. XI Accessory - Normal Bilaterally. XII Hypoglossal - Tongue protrudes midline and moves symmetrically.  MOTOR-Bulk and Contour - Normal. Tone - Normal tone, no abnormal movements. Strength - 5/5 normal muscle strength - Strength full throughout.  SENSORY-Normal - LT, DSS, Vibration.  REFLEXES- DTR's 2+ throughout.  COORDINATION-Normal FFM, SAMANTHA, FNF - bilaterally.  GAIT-NA        LABS:    CARDIAC MARKERS:                                14.1   7.81  )-----------( 204      ( 25 Jul 2020 06:30 )             42.4     07-25    135  |  100  |  13  ----------------------------<  95  4.3   |  25  |  0.97    Ca    9.0      25 Jul 2020 06:28      proBNP:   Lipid Profile:   HgA1c:   TSH:             TELEMETRY: 	    ECG:  	  RADIOLOGY:   DIAGNOSTIC TESTING:  [ ] Echocardiogram:  [ ]  Catheterization:  [ ] Stress Test:    OTHER:

## 2020-07-25 NOTE — PROGRESS NOTE ADULT - SUBJECTIVE AND OBJECTIVE BOX
PLEX on 7/23 well tolerated. No interval events. Interviewed the patient over phone this morning prior to the PLEX, with apheresis nurse by his side. He states he has no neurological changes since last procedure. He was tired after PLEX but otherwise no bleeding, no HA, no visual disturbance or seizure like activity.     MEDICATIONS  (STANDING):  chlorhexidine 4% Liquid 1 Application(s) Topical <User Schedule>  cyanocobalamin Injectable 1000 MICROGram(s) IntraMuscular daily  enoxaparin Injectable 40 milliGRAM(s) SubCutaneous daily  finasteride 5 milliGRAM(s) Oral daily  lidocaine 1% Injectable 20 milliLiter(s) Local Injection once  simvastatin 10 milliGRAM(s) Oral at bedtime  sodium chloride 2 Gram(s) Oral every 8 hours  sodium chloride 0.9%. 1000 milliLiter(s) (95 mL/Hr) IV Continuous <Continuous>  valproate sodium IVPB 250 milliGRAM(s) IV Intermittent three times a day      Vital Signs Last 24 Hrs  T(C): 36.3 (25 Jul 2020 19:19), Max: 36.7 (24 Jul 2020 23:23)  T(F): 97.3 (25 Jul 2020 19:19), Max: 98.1 (24 Jul 2020 23:23)  HR: 59 (25 Jul 2020 19:19) (50 - 60)  BP: 136/80 (25 Jul 2020 19:19) (103/63 - 139/79  RR: 18 (25 Jul 2020 19:19) (18 - 18)  SpO2: 98% (25 Jul 2020 19:19) (97% - 99%)                          14.1   7.81  )-----------( 204      ( 25 Jul 2020 06:30 )             42.4     07-25    135  |  100  |  13  ----------------------------<  95  4.3   |  25  |  0.97    Ca    9.0      25 Jul 2020 06:28    Fibrinogen Assay: 500 mg/dL (07-25 @ 08:46)

## 2020-07-25 NOTE — PROGRESS NOTE ADULT - ASSESSMENT
75 year old male with no significant past history presented with a month old history of involuntary movement of the LUE along with weakness that has now progressed to involve his RUE and LE.  The patient describes it as initial spasms that later progresses to shaking. His weakness has led to inability to hold heavy objects. Had multiple falls in June. He denies syncope, loss of consciousness, tongue biting, and urinary incontinence. Lives alone and ambulates without assistance.  Patient denies weight loss, fevers, chills, abdominal pain, cough, chest pain, nausea, vomiting, numbness, tingling, headaches, vision changes, sick contacts, confusion, memory difficulties, change in behavior/personality, and sleep disturbances.  S/p LP pending AIE panel and ID panel. S/p CTAP negative for malignancy or lymph node enlargement (to r/o paraneoplastic syndrome).   Working diagnosis as supported by  MRI on 7/22: hyperintense signal within the mesial right temporal lobe in the region of the hippocampal and parahippocampal gyri, with associated subtle swelling. Differential diagnosis includes autoimmune encephalitis, herpes encephalitis, low-grade glioblastoma, and sequelae of recent seizure. 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 Hence neuro consulted blood bank for PLEX for limbic encephalitis.    PLEX#1 on 7/23 well tolerated no interval events or neurological changes.    PLEX#2 today well tolerated with 5% albumin as replacement fluid. (Fibrinogen 500). Processed 1.0 plasma volume. PLEX#3 scheduled for 7/27.

## 2020-07-25 NOTE — PROGRESS NOTE ADULT - SUBJECTIVE AND OBJECTIVE BOX
*************************************  NEUROLOGY PROGRESS NOTE  **************************************    DENA FARNSWORTH  Male    Subjective:    MEDICATIONS  (STANDING):  chlorhexidine 4% Liquid 1 Application(s) Topical <User Schedule>  cyanocobalamin Injectable 1000 MICROGram(s) IntraMuscular daily  enoxaparin Injectable 40 milliGRAM(s) SubCutaneous daily  finasteride 5 milliGRAM(s) Oral daily  lidocaine 1% Injectable 20 milliLiter(s) Local Injection once  simvastatin 10 milliGRAM(s) Oral at bedtime  sodium chloride 2 Gram(s) Oral every 8 hours  sodium chloride 0.9%. 1000 milliLiter(s) (95 mL/Hr) IV Continuous <Continuous>  valproate sodium IVPB 250 milliGRAM(s) IV Intermittent three times a day    MEDICATIONS  (PRN):  LORazepam   Injectable 2 milliGRAM(s) IV Push once PRN Seizure activity  polyethylene glycol 3350 17 Gram(s) Oral daily PRN Constipation  sodium chloride 0.9% lock flush 10 milliLiter(s) IV Push every 1 hour PRN Pre/post blood products, medications, blood draw, and to maintain line patency    Vital Signs (24 Hrs):  T(C): 36.6 (07-25-20 @ 07:34), Max: 36.7 (07-24-20 @ 15:16)  HR: 51 (07-25-20 @ 07:34) (50 - 59)  BP: 132/80 (07-25-20 @ 07:34) (101/64 - 139/79)  RR: 18 (07-25-20 @ 07:34) (18 - 18)  SpO2: 99% (07-25-20 @ 07:34) (95% - 99%)  Wt(kg): --  Daily     Daily     I&O's Summary    24 Jul 2020 07:01  -  25 Jul 2020 07:00  --------------------------------------------------------  IN: 260 mL / OUT: 0 mL / NET: 260 mL        PHYSICAL EXAMINATION:  General: Well-developed, well nourished, in no acute distress.  Neurologic:  - Mental Status:  Alert, awake, oriented to person, place, and time; Speech is fluent but hoarse with intact naming, repetition, and comprehension. Intact serial 7s, 3/3 5 minute recall. Judgement and insight intact. Slowed speech and blunted affect. Occasional faciobrachial dystonic movements.  - Cranial Nerves II-XII:  VFF, No nystagmus or APD noted, EOMI, hypometric saccadic movements on horizontal tracking, PERRLA, V1-V3 intact, slight R nasolabial flattening, t/p midline, SCM/trap intact.  - Motor:  Strength is 5/5 throughout.  There is no pronator drift.  Normal muscle bulk. Slight rigidity in the lower extremities bilaterally R>L. Occasional dystonic movements of the R and L arms.  - Reflexes:  2+ and symmetric at the biceps, triceps, brachioradialis, knees, and ankles.  Plantar responses flexor.  - Sensory:  Intact to light touch, pin prick, and joint-position sense throughout. Decreased vibratory sense in the lower extremities bilaterally.   - Coordination:  Finger-nose-finger and heel-knee-shin intact without dysmetria.  Rapid alternating hand movements intact.  - Gait: Cautious but walks well.     LABS:      RADIOLOGY & ADDITIONAL STUDIES:      Outside MRI report noting:   IMPRESSION:  Asymmetric FLAIR hyperintensity in the right limbic lobe involving the hippocampal gyrus. Differential diagnosis includes, but is not limited to, infectious/inflammatory encephalitis, limbic encephalitis and sequela of epilepsy/post ictal edema. Additional mild chronic microvascular changes without acute infarct.    EEG:  EEG Summary:  Abnormal EEG in the awake, drowsy and asleep states.  -  Electrographical seizures, right temporal, with spread to the left   -  Occasional sharp wave, focal, right temporal   - Bitemporal TIRDA  -  Intermittent focal slowing, bilateral temporal   -  Diffuse excess beta activity.      Impression/Clinical Correlate:    Abnormal EEG due to     1.	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	There is epileptogenic focus with structural or functional abnormality in bilateral temporal regions, right greater than left.   Diffuse excess beta activity can be seen in patient treated with sedative medications        MRI brain here as per my read noting hyperintensity in the right limbic area similar to outside MRI.   < from: MR Head w/wo IV Cont (07.22.20 @ 18:41) >  IMPRESSION:    Redemonstration of T2 and FLAIR hyperintense signal within the mesial right temporal lobe in the region of the hippocampal and parahippocampal gyri, with associated subtle swelling. Differential diagnosis includes autoimmune encephalitis, herpes encephalitis, low-grade glioblastoma, and sequelae of recentseizure.    No abnormal parenchymal or leptomeningeal enhancement.    No acute intracranial hemorrhage or acute infarction.    < end of copied text >    < from: CT Chest w/ IV Cont (07.23.20 @ 11:08) >  IMPRESSION:   No CT evidence of acute intrathoracic or intra-abdominal pathology. No solid organ lesion or lymphadenopathy.    < end of copied text >  < from: CT Chest w/ IV Cont (07.23.20 @ 11:08) >  CHEST:   LUNGS AND LARGE AIRWAYS: Patent central airways. A 0.5 cm perifissural nodule along the right minor fissure, likely intrapulmonary lymph node. Also a triangular 0.2 cm nodule in the right middle lobe (series 3, image 124), possible intrapulmonary lymph node. Chronic tree-in-bud opacities in the periphery of the right lower lobe. Scattered calcified granulomata.    < end of copied text > *************************************  NEUROLOGY PROGRESS NOTE  **************************************    DENA FARNSWORTH  Male    Subjective:  No event overnight.     MEDICATIONS  (STANDING):  chlorhexidine 4% Liquid 1 Application(s) Topical <User Schedule>  cyanocobalamin Injectable 1000 MICROGram(s) IntraMuscular daily  enoxaparin Injectable 40 milliGRAM(s) SubCutaneous daily  finasteride 5 milliGRAM(s) Oral daily  lidocaine 1% Injectable 20 milliLiter(s) Local Injection once  simvastatin 10 milliGRAM(s) Oral at bedtime  sodium chloride 2 Gram(s) Oral every 8 hours  sodium chloride 0.9%. 1000 milliLiter(s) (95 mL/Hr) IV Continuous <Continuous>  valproate sodium IVPB 250 milliGRAM(s) IV Intermittent three times a day    MEDICATIONS  (PRN):  LORazepam   Injectable 2 milliGRAM(s) IV Push once PRN Seizure activity  polyethylene glycol 3350 17 Gram(s) Oral daily PRN Constipation  sodium chloride 0.9% lock flush 10 milliLiter(s) IV Push every 1 hour PRN Pre/post blood products, medications, blood draw, and to maintain line patency    Vital Signs (24 Hrs):  T(C): 36.6 (07-25-20 @ 07:34), Max: 36.7 (07-24-20 @ 15:16)  HR: 51 (07-25-20 @ 07:34) (50 - 59)  BP: 132/80 (07-25-20 @ 07:34) (101/64 - 139/79)  RR: 18 (07-25-20 @ 07:34) (18 - 18)  SpO2: 99% (07-25-20 @ 07:34) (95% - 99%)  Wt(kg): --  Daily     Daily     I&O's Summary    24 Jul 2020 07:01  -  25 Jul 2020 07:00  --------------------------------------------------------  IN: 260 mL / OUT: 0 mL / NET: 260 mL        PHYSICAL EXAMINATION:  General: Well-developed, well nourished, in no acute distress.  Neurologic:  - Mental Status:  Alert, awake, oriented to person, place, and time; Speech is fluent but hoarse with intact naming, repetition, and comprehension. Intact serial 7s, 3/3 5 minute recall. Judgement and insight intact. Slowed speech and blunted affect. Occasional faciobrachial dystonic movements.  - Cranial Nerves II-XII:  VFF, No nystagmus or APD noted, EOMI, hypometric saccadic movements on horizontal tracking, PERRLA, V1-V3 intact, slight R nasolabial flattening, t/p midline, SCM/trap intact.  - Motor:  Strength is 5/5 throughout.  There is no pronator drift.  Normal muscle bulk. Slight rigidity in the lower extremities bilaterally R>L. Occasional dystonic movements of the R and L arms.  - Reflexes:  2+ and symmetric at the biceps, triceps, brachioradialis, knees, and ankles.  Plantar responses flexor.  - Sensory:  Intact to light touch, pin prick, and joint-position sense throughout. Decreased vibratory sense in the lower extremities bilaterally.   - Coordination:  Finger-nose-finger and heel-knee-shin intact without dysmetria.  Rapid alternating hand movements intact.  - Gait: Cautious but walks well.     LABS:      RADIOLOGY & ADDITIONAL STUDIES:      Outside MRI report noting:   IMPRESSION:  Asymmetric FLAIR hyperintensity in the right limbic lobe involving the hippocampal gyrus. Differential diagnosis includes, but is not limited to, infectious/inflammatory encephalitis, limbic encephalitis and sequela of epilepsy/post ictal edema. Additional mild chronic microvascular changes without acute infarct.    EEG:  EEG Summary:  Abnormal EEG in the awake, drowsy and asleep states.  -  Electrographical seizures, right temporal, with spread to the left   -  Occasional sharp wave, focal, right temporal   - Bitemporal TIRDA  -  Intermittent focal slowing, bilateral temporal   -  Diffuse excess beta activity.      Impression/Clinical Correlate:    Abnormal EEG due to     1.	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	There is epileptogenic focus with structural or functional abnormality in bilateral temporal regions, right greater than left.   Diffuse excess beta activity can be seen in patient treated with sedative medications        MRI brain here as per my read noting hyperintensity in the right limbic area similar to outside MRI.   < from: MR Head w/wo IV Cont (07.22.20 @ 18:41) >  IMPRESSION:    Redemonstration of T2 and FLAIR hyperintense signal within the mesial right temporal lobe in the region of the hippocampal and parahippocampal gyri, with associated subtle swelling. Differential diagnosis includes autoimmune encephalitis, herpes encephalitis, low-grade glioblastoma, and sequelae of recentseizure.    No abnormal parenchymal or leptomeningeal enhancement.    No acute intracranial hemorrhage or acute infarction.    < end of copied text >    < from: CT Chest w/ IV Cont (07.23.20 @ 11:08) >  IMPRESSION:   No CT evidence of acute intrathoracic or intra-abdominal pathology. No solid organ lesion or lymphadenopathy.    < end of copied text >  < from: CT Chest w/ IV Cont (07.23.20 @ 11:08) >  CHEST:   LUNGS AND LARGE AIRWAYS: Patent central airways. A 0.5 cm perifissural nodule along the right minor fissure, likely intrapulmonary lymph node. Also a triangular 0.2 cm nodule in the right middle lobe (series 3, image 124), possible intrapulmonary lymph node. Chronic tree-in-bud opacities in the periphery of the right lower lobe. Scattered calcified granulomata.    < end of copied text > *************************************  NEUROLOGY PROGRESS NOTE  **************************************    DENA FARNSWORTH  Male    Subjective:  No event overnight. Feels well, no complaints    MEDICATIONS  (STANDING):  chlorhexidine 4% Liquid 1 Application(s) Topical <User Schedule>  cyanocobalamin Injectable 1000 MICROGram(s) IntraMuscular daily  enoxaparin Injectable 40 milliGRAM(s) SubCutaneous daily  finasteride 5 milliGRAM(s) Oral daily  lidocaine 1% Injectable 20 milliLiter(s) Local Injection once  simvastatin 10 milliGRAM(s) Oral at bedtime  sodium chloride 2 Gram(s) Oral every 8 hours  sodium chloride 0.9%. 1000 milliLiter(s) (95 mL/Hr) IV Continuous <Continuous>  valproate sodium IVPB 250 milliGRAM(s) IV Intermittent three times a day    MEDICATIONS  (PRN):  LORazepam   Injectable 2 milliGRAM(s) IV Push once PRN Seizure activity  polyethylene glycol 3350 17 Gram(s) Oral daily PRN Constipation  sodium chloride 0.9% lock flush 10 milliLiter(s) IV Push every 1 hour PRN Pre/post blood products, medications, blood draw, and to maintain line patency    Vital Signs (24 Hrs):  T(C): 36.6 (07-25-20 @ 07:34), Max: 36.7 (07-24-20 @ 15:16)  HR: 51 (07-25-20 @ 07:34) (50 - 59)  BP: 132/80 (07-25-20 @ 07:34) (101/64 - 139/79)  RR: 18 (07-25-20 @ 07:34) (18 - 18)  SpO2: 99% (07-25-20 @ 07:34) (95% - 99%)  Wt(kg): --  Daily     Daily     I&O's Summary    24 Jul 2020 07:01  -  25 Jul 2020 07:00  --------------------------------------------------------  IN: 260 mL / OUT: 0 mL / NET: 260 mL        PE:  MS: Awake, alert. Normal affect. Follows all commands, answering questions    Language: Speech is clear, fluent with good comprehension     CNs: eyes moving spontaneously in all directions. well developed masseter muscles b/l. No facial asymmetry b/l. Symmetric palate elevation in midline. Gag reflex deferred. Tongue midline, normal movements, no atrophy.    Motor: Normal muscle bulk & tone. No noticeable tremor or seizure. No pronator drift.  Moving all extremities equally without laterality      RADIOLOGY & ADDITIONAL STUDIES:      Outside MRI report noting:   IMPRESSION:  Asymmetric FLAIR hyperintensity in the right limbic lobe involving the hippocampal gyrus. Differential diagnosis includes, but is not limited to, infectious/inflammatory encephalitis, limbic encephalitis and sequela of epilepsy/post ictal edema. Additional mild chronic microvascular changes without acute infarct.    EEG:  EEG Summary:  Abnormal EEG in the awake, drowsy and asleep states.  -  Electrographical seizures, right temporal, with spread to the left   -  Occasional sharp wave, focal, right temporal   - Bitemporal TIRDA  -  Intermittent focal slowing, bilateral temporal   -  Diffuse excess beta activity.      Impression/Clinical Correlate:    Abnormal EEG due to     1.	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	There is epileptogenic focus with structural or functional abnormality in bilateral temporal regions, right greater than left.   Diffuse excess beta activity can be seen in patient treated with sedative medications        MRI brain here as per my read noting hyperintensity in the right limbic area similar to outside MRI.   < from: MR Head w/wo IV Cont (07.22.20 @ 18:41) >  IMPRESSION:    Redemonstration of T2 and FLAIR hyperintense signal within the mesial right temporal lobe in the region of the hippocampal and parahippocampal gyri, with associated subtle swelling. Differential diagnosis includes autoimmune encephalitis, herpes encephalitis, low-grade glioblastoma, and sequelae of recentseizure.    No abnormal parenchymal or leptomeningeal enhancement.    No acute intracranial hemorrhage or acute infarction.    < end of copied text >    < from: CT Chest w/ IV Cont (07.23.20 @ 11:08) >  IMPRESSION:   No CT evidence of acute intrathoracic or intra-abdominal pathology. No solid organ lesion or lymphadenopathy.    < end of copied text >  < from: CT Chest w/ IV Cont (07.23.20 @ 11:08) >  CHEST:   LUNGS AND LARGE AIRWAYS: Patent central airways. A 0.5 cm perifissural nodule along the right minor fissure, likely intrapulmonary lymph node. Also a triangular 0.2 cm nodule in the right middle lobe (series 3, image 124), possible intrapulmonary lymph node. Chronic tree-in-bud opacities in the periphery of the right lower lobe. Scattered calcified granulomata.    < end of copied text >

## 2020-07-25 NOTE — PROGRESS NOTE ADULT - ASSESSMENT
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

## 2020-07-26 LAB
ANION GAP SERPL CALC-SCNC: 9 MMOL/L — SIGNIFICANT CHANGE UP (ref 5–17)
BUN SERPL-MCNC: 12 MG/DL — SIGNIFICANT CHANGE UP (ref 7–23)
CALCIUM SERPL-MCNC: 8.9 MG/DL — SIGNIFICANT CHANGE UP (ref 8.4–10.5)
CHLORIDE SERPL-SCNC: 103 MMOL/L — SIGNIFICANT CHANGE UP (ref 96–108)
CO2 SERPL-SCNC: 25 MMOL/L — SIGNIFICANT CHANGE UP (ref 22–31)
CREAT SERPL-MCNC: 1.01 MG/DL — SIGNIFICANT CHANGE UP (ref 0.5–1.3)
CULTURE RESULTS: SIGNIFICANT CHANGE UP
GLUCOSE BLDC GLUCOMTR-MCNC: 122 MG/DL — HIGH (ref 70–99)
GLUCOSE SERPL-MCNC: 95 MG/DL — SIGNIFICANT CHANGE UP (ref 70–99)
POTASSIUM SERPL-MCNC: 4.6 MMOL/L — SIGNIFICANT CHANGE UP (ref 3.5–5.3)
POTASSIUM SERPL-SCNC: 4.6 MMOL/L — SIGNIFICANT CHANGE UP (ref 3.5–5.3)
SODIUM SERPL-SCNC: 137 MMOL/L — SIGNIFICANT CHANGE UP (ref 135–145)
SPECIMEN SOURCE: SIGNIFICANT CHANGE UP

## 2020-07-26 PROCEDURE — 99233 SBSQ HOSP IP/OBS HIGH 50: CPT

## 2020-07-26 RX ADMIN — CHLORHEXIDINE GLUCONATE 1 APPLICATION(S): 213 SOLUTION TOPICAL at 05:25

## 2020-07-26 RX ADMIN — PREGABALIN 1000 MICROGRAM(S): 225 CAPSULE ORAL at 12:01

## 2020-07-26 RX ADMIN — FINASTERIDE 5 MILLIGRAM(S): 5 TABLET, FILM COATED ORAL at 22:39

## 2020-07-26 RX ADMIN — ENOXAPARIN SODIUM 40 MILLIGRAM(S): 100 INJECTION SUBCUTANEOUS at 12:01

## 2020-07-26 RX ADMIN — Medication 25 MILLIGRAM(S): at 22:38

## 2020-07-26 RX ADMIN — SODIUM CHLORIDE 2 GRAM(S): 9 INJECTION INTRAMUSCULAR; INTRAVENOUS; SUBCUTANEOUS at 22:39

## 2020-07-26 RX ADMIN — SIMVASTATIN 10 MILLIGRAM(S): 20 TABLET, FILM COATED ORAL at 22:39

## 2020-07-26 RX ADMIN — SODIUM CHLORIDE 2 GRAM(S): 9 INJECTION INTRAMUSCULAR; INTRAVENOUS; SUBCUTANEOUS at 15:23

## 2020-07-26 RX ADMIN — Medication 25 MILLIGRAM(S): at 15:22

## 2020-07-26 RX ADMIN — SODIUM CHLORIDE 2 GRAM(S): 9 INJECTION INTRAMUSCULAR; INTRAVENOUS; SUBCUTANEOUS at 05:25

## 2020-07-26 RX ADMIN — Medication 25 MILLIGRAM(S): at 05:25

## 2020-07-26 NOTE — PROGRESS NOTE ADULT - ASSESSMENT
74 yo male PMHx BPH, HTN, and HLD presents to ED with intermittent left upper extremity tonic clonic activity with progression to right upper and lower extremities and multiple falls since June, concerning for seizures. Outpatient MRI brain without contrast demonstrates hyperenhancement of right limbic lobe.     # Bilateral upper extremity tonic clonic activity   # autoimmune encephalitis  # HTN  # HLD  # BPH  # lung nodules and tree in bud opacities    Appreciate neurology care  Follow up serum autoimmune workup  MRI brain signal in right temporal lobe concern for LGI1 encephalitis ro paraneoplastic process  cont proscar  cont statin  holding amlodipine  cont PLEX per neuro    PCP Dr. Tevin Ovalle    [facility] [phone].

## 2020-07-26 NOTE — PROGRESS NOTE ADULT - SUBJECTIVE AND OBJECTIVE BOX
Patient is a 75y old  Male who presents with a chief complaint of limbic encephalitis (26 Jul 2020 09:54)      SUBJECTIVE / OVERNIGHT EVENTS:    Patient seen and examined. denies cp sob nvd. co uncontrolled movements UE.      Vital Signs Last 24 Hrs  T(C): 36.7 (26 Jul 2020 08:04), Max: 37 (25 Jul 2020 23:13)  T(F): 98.1 (26 Jul 2020 08:04), Max: 98.6 (25 Jul 2020 23:13)  HR: 59 (26 Jul 2020 08:04) (55 - 85)  BP: 114/67 (26 Jul 2020 08:04) (103/63 - 136/80)  BP(mean): --  RR: 18 (26 Jul 2020 08:04) (18 - 18)  SpO2: 95% (26 Jul 2020 08:04) (95% - 98%)  I&O's Summary    25 Jul 2020 07:01  -  26 Jul 2020 07:00  --------------------------------------------------------  IN: 200 mL / OUT: 0 mL / NET: 200 mL        PE:  GENERAL: NAD, AAOx3  HEAD:  Atraumatic, Normocephalic  CHEST/LUNG: CTABL, No wheeze  HEART: Regular rate and rhythm; no murmur  ABDOMEN: Soft, Nontender, Nondistended; Bowel sounds present  EXTREMITIES:  2+ Peripheral Pulses, No clubbing, cyanosis, or edema  SKIN: No rashes or lesions  NEURO: visible UE contractions    LABS:                        14.1   7.81  )-----------( 204      ( 25 Jul 2020 06:30 )             42.4     07-26    137  |  103  |  12  ----------------------------<  95  4.6   |  25  |  1.01    Ca    8.9      26 Jul 2020 05:45        CAPILLARY BLOOD GLUCOSE                RADIOLOGY & ADDITIONAL TESTS:    Imaging Personally Reviewed:  [x] YES  [ ] NO    Consultant(s) Notes Reviewed:  [x] YES  [ ] NO    MEDICATIONS  (STANDING):  chlorhexidine 4% Liquid 1 Application(s) Topical <User Schedule>  cyanocobalamin Injectable 1000 MICROGram(s) IntraMuscular daily  enoxaparin Injectable 40 milliGRAM(s) SubCutaneous daily  finasteride 5 milliGRAM(s) Oral daily  lidocaine 1% Injectable 20 milliLiter(s) Local Injection once  simvastatin 10 milliGRAM(s) Oral at bedtime  sodium chloride 2 Gram(s) Oral every 8 hours  sodium chloride 0.9%. 1000 milliLiter(s) (95 mL/Hr) IV Continuous <Continuous>  valproate sodium IVPB 250 milliGRAM(s) IV Intermittent three times a day    MEDICATIONS  (PRN):  LORazepam   Injectable 2 milliGRAM(s) IV Push once PRN Seizure activity  polyethylene glycol 3350 17 Gram(s) Oral daily PRN Constipation  sodium chloride 0.9% lock flush 10 milliLiter(s) IV Push every 1 hour PRN Pre/post blood products, medications, blood draw, and to maintain line patency      Care Discussed with Consultants/Other Providers [x] YES  [ ] NO    HEALTH ISSUES - PROBLEM Dx:  Limbic encephalitis: Limbic encephalitis  Bradycardia: Bradycardia

## 2020-07-26 NOTE — PROGRESS NOTE ADULT - ASSESSMENT
76 y/o male with PMHx BPH, HTN, and HLD presents to ED with intermittent left upper extremity tonic clonic activity with progression to right upper and lower extremities and multiple falls since June, concerning for seizures. Outpatient MRI brain without contrast demonstrates hyperenhancement of right limbic lobe.     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 Differentials also includes autoimmune encephalitis paraneoplastic encephalitis vs. other inflammatory encephalitis vs. infectious encephalitis vs. less likely focal dystonia.    Plan  [] PLEX therapy, kendell mercado placed by IR, total 5 sessions scheduled, first session 7/23, next session 7/27  [] valproate 250mg tid  [] Follow up serum autoimmune workup, encephalopathy panel, paraneoplastic panel, LG1b, NMDA, voltage gated K, protein electropheresis  [] home amlodipine held due to low BP and PLEX therapy, can restart if patient becomes hypertensive  [x] Repeat MRI brain with contrast, official read as above  [x] Report of outside MRI as above, read by in house neuroradiology noting enhancement in R limbic area with broad differential of autoimmune vs infectious, concerning for limbic encephalitis. C-spine MRI with chronic degenerative changes, no acute lesions noted  [x] EEG, report as above, no longer connected to EEG  [x] pulm c/s - no further imaging needed for pulmonary nodules  [] PT/OT

## 2020-07-26 NOTE — PROGRESS NOTE ADULT - SUBJECTIVE AND OBJECTIVE BOX
· Subjective and Objective: 	  Neurology Progress    DENA FARNSWORTH75yMale    HPI:  HPI:  76 y/o RH male with PMHx BPH, HTN, and HLD presents to ED with intermittent left upper extremity spasms and weakness since the first week of June. Patient reports that symptoms have progressed to include his right upper and lower extremity since late June. He states that in the beginning of June, he had pain below his left ear. A couple weeks later, patient started to have stiffness of his left upper extremity that felt like spasms and was accompanied by shaking. He then began to have involvement of his right side with involvement of his right upper and right lower extremity. The shaking is usually followed by weakness, which lasts a few minutes before he regains full function. The weakness in his arms has led him to break dishes multiple times. Patient states that holding a weighted object may precipitate the episodes. He does not know how often the episodes occur, but says they are very frequent when he does his daily activities. He cannot recall exactly when the last one was. The patient has had multiple falls since June, most recently a couple weeks ago that he attributes to loss of balance. He denies syncope, loss of consciousness, tongue biting, and urinary incontinence. Patient denies previous history of a neurological disorder, including seizures. Patient does not ambulate with mechanical assistance at baseline. He lives alone and says that the episodes have not been witnessed.  Patient had an MRI brain without contrast yesterday after going to a private neurologist. He states that his friend Belinda ([phone]) will bring in CD with images to be reviewed.    Patient denies weight loss, fevers, chills, abdominal pain, cough, chest pain, nausea, vomiting, numbness, tingling, headaches, vision changes, sick contacts, confusion, memory difficulties, change in behavior/personality, and sleep disturbances.    Home medications:  Finasteride 5mg daily  Amlodipine 5mg daily  Simvastatin 10mg nightly  Tizanidine 2mg at bedtime PRN    PAST MEDICAL & SURGICAL HISTORY:  HLD (hyperlipidemia)  BPH (benign prostatic hyperplasia)  No significant past surgical history    Allergies  No Known Allergies    SHx - No smoking, No ETOH, No drug abuse    Review of Systems:  CONSTITUTIONAL:   HEENT:  No visual loss, blurred vision, double vision.  No hearing loss, sneezing, congestion, runny nose or sore throat.  SKIN:  No rash or itching.  CARDIOVASCULAR:  No chest pain, chest pressure or chest discomfort. No palpitations or edema.  RESPIRATORY:  No shortness of breath, cough or sputum.  GASTROINTESTINAL:  No anorexia, nausea, vomiting or diarrhea. No abdominal pain.  GENITOURINARY:  No dysuria. No increased frequency. No retention. No incontinence.  NEUROLOGICAL:  See HPI  MUSCULOSKELETAL:  No muscle, back pain, joint pain or stiffness. (21 Jul 2020 09:54)      Past Medical History  Hypertension  HLD (hyperlipidemia)  BPH (benign prostatic hyperplasia)      Past Surgical History  No significant past surgical history      MEDICATIONS    chlorhexidine 4% Liquid 1 Application(s) Topical <User Schedule>  cyanocobalamin Injectable 1000 MICROGram(s) IntraMuscular daily  enoxaparin Injectable 40 milliGRAM(s) SubCutaneous daily  finasteride 5 milliGRAM(s) Oral daily  lidocaine 1% Injectable 20 milliLiter(s) Local Injection once  LORazepam   Injectable 2 milliGRAM(s) IV Push once PRN  polyethylene glycol 3350 17 Gram(s) Oral daily PRN  simvastatin 10 milliGRAM(s) Oral at bedtime  sodium chloride 2 Gram(s) Oral every 8 hours  sodium chloride 0.9% lock flush 10 milliLiter(s) IV Push every 1 hour PRN  sodium chloride 0.9%. 1000 milliLiter(s) IV Continuous <Continuous>  valproate sodium IVPB 250 milliGRAM(s) IV Intermittent three times a day         Family history: No history of dementia, strokes, or seizures   FAMILY HISTORY:    SOCIAL HISTORY -- No history of tobacco or alcohol use     Allergies    No Known Allergies    Intolerances                On Neurological Examination:    Mental Status - Patient is alert, awake, oriented X3. .   Follows commands well and able to answer questions appropriately. Mood and affect  normal  Follow simple commands able to repeat  able to name.  Speech - Fluent no Dysarthria  no  Aphasia                              Cranial Nerves - Extraocular muscle intact  DANIKA Facial symmetry Tongue midline, CnV1to V3 intact gross hearing intact      Motor Exam -   Right upper  5/5 throughout  Left upper 5/5 throughtout  Right lower- 5/5 throughout  Left lower 5/5 throughout  Coordination -finger to nose intact  Muscle tone - is normal all over. No asymmetry is seen.      Sensory    Bilateral intact to light touch    Gait -  normal  no ataxia     GENERAL Exam:     Nontoxic , No Acute Distress   	  HEENT:  normocephalic, atraumatic  		  LUNGS:	Clear bilaterally  No Wheeze      VASCULAR: no carotid brui  	  HEART:	 Normal S1S2   No murmur RRR        	  MUSCULOSKELETAL: Normal Range of Motion  	   SKIN:      Normal   No Ecchymosis               LABS:  CBC Full  -  ( 25 Jul 2020 06:30 )  WBC Count : 7.81 K/uL  RBC Count : 4.78 M/uL  Hemoglobin : 14.1 g/dL  Hematocrit : 42.4 %  Platelet Count - Automated : 204 K/uL  Mean Cell Volume : 88.7 fl  Mean Cell Hemoglobin : 29.5 pg  Mean Cell Hemoglobin Concentration : 33.3 gm/dL  Auto Neutrophil # : x  Auto Lymphocyte # : x  Auto Monocyte # : x  Auto Eosinophil # : x  Auto Basophil # : x  Auto Neutrophil % : x  Auto Lymphocyte % : x  Auto Monocyte % : x  Auto Eosinophil % : x  Auto Basophil % : x      07-25    135  |  100  |  13  ----------------------------<  95  4.3   |  25  |  0.97    Ca    9.0      25 Jul 2020 06:28    TPro  x   /  Alb  3346<L>  /  TBili  x   /  DBili  x   /  AST  x   /  ALT  x   /  AlkPhos  x   07-23    Hemoglobin A1C:     LIVER FUNCTIONS - ( 23 Jul 2020 13:53 )  Alb: 3346 mg/dL / Pro: x     / ALK PHOS: x     / ALT: x     / AST: x     / GGT: x           Vitamin B12         RADIOLOGY    EKG              schoenberg     Assessment and Plan:   · Assessment		  This is a 75 year old male wth presumed limbic encephalitis     plex therapy in place     follow up LP results   appreciate medicine pulmonary, cardiology input      Electronic Signatures:  Schoenberg, Laura Gray (MD)

## 2020-07-26 NOTE — PROGRESS NOTE ADULT - SUBJECTIVE AND OBJECTIVE BOX
Subjective: Patient seen and examined. No new events except as noted.   still with spasms   REVIEW OF SYSTEMS:    CONSTITUTIONAL: + weakness, fevers or chills  EYES/ENT: No visual changes;  No vertigo or throat pain   NECK: No pain or stiffness  RESPIRATORY: No cough, wheezing, hemoptysis; No shortness of breath  CARDIOVASCULAR: No chest pain or palpitations  GASTROINTESTINAL: No abdominal or epigastric pain. No nausea, vomiting, or hematemesis; No diarrhea or constipation. No melena or hematochezia.  GENITOURINARY: No dysuria, frequency or hematuria  NEUROLOGICAL: No numbness or weakness  SKIN: No itching, burning, rashes, or lesions   All other review of systems is negative unless indicated above.    MEDICATIONS:  MEDICATIONS  (STANDING):  chlorhexidine 4% Liquid 1 Application(s) Topical <User Schedule>  cyanocobalamin Injectable 1000 MICROGram(s) IntraMuscular daily  enoxaparin Injectable 40 milliGRAM(s) SubCutaneous daily  finasteride 5 milliGRAM(s) Oral daily  lidocaine 1% Injectable 20 milliLiter(s) Local Injection once  simvastatin 10 milliGRAM(s) Oral at bedtime  sodium chloride 2 Gram(s) Oral every 8 hours  sodium chloride 0.9%. 1000 milliLiter(s) (95 mL/Hr) IV Continuous <Continuous>  valproate sodium IVPB 250 milliGRAM(s) IV Intermittent three times a day      PHYSICAL EXAM:  T(C): 36.7 (07-26-20 @ 08:04), Max: 37 (07-25-20 @ 23:13)  HR: 59 (07-26-20 @ 08:04) (55 - 85)  BP: 114/67 (07-26-20 @ 08:04) (103/63 - 136/80)  RR: 18 (07-26-20 @ 08:04) (18 - 18)  SpO2: 95% (07-26-20 @ 08:04) (95% - 98%)  Wt(kg): --  I&O's Summary    25 Jul 2020 07:01  -  26 Jul 2020 07:00  --------------------------------------------------------  IN: 200 mL / OUT: 0 mL / NET: 200 mL          Appearance: Normal	  HEENT:   Normal oral mucosa, PERRL, EOMI	  Lymphatic: No lymphadenopathy , R IJ shiley   Cardiovascular: Normal S1 S2, No JVD, No murmurs , Peripheral pulses palpable 2+ bilaterally  Respiratory: Lungs clear to auscultation, normal effort 	  Gastrointestinal:  Soft, Non-tender, + BS	  Skin: No rashes, No ecchymoses, No cyanosis, warm to touch  Musculoskeletal: Normal range of motion, normal strength  Psychiatry:  Mood & affect appropriate  Ext: No edema      LABS:    CARDIAC MARKERS:                                14.1   7.81  )-----------( 204      ( 25 Jul 2020 06:30 )             42.4     07-26    137  |  103  |  12  ----------------------------<  95  4.6   |  25  |  1.01    Ca    8.9      26 Jul 2020 05:45      proBNP:   Lipid Profile:   HgA1c:   TSH:             TELEMETRY: 	    ECG:  	  RADIOLOGY:   DIAGNOSTIC TESTING:  [ ] Echocardiogram:  [ ]  Catheterization:  [ ] Stress Test:    OTHER:

## 2020-07-26 NOTE — PROGRESS NOTE ADULT - SUBJECTIVE AND OBJECTIVE BOX
MRN-[id number]  Patient is a 75y old  Male who presents with a chief complaint of limbic encephalitis (26 Jul 2020 08:14)    HPI:  HPI:  76 y/o RH male with PMHx BPH, HTN, and HLD presents to ED with intermittent left upper extremity spasms and weakness since the first week of June. Patient reports that symptoms have progressed to include his right upper and lower extremity since late June. He states that in the beginning of June, he had pain below his left ear. A couple weeks later, patient started to have stiffness of his left upper extremity that felt like spasms and was accompanied by shaking. He then began to have involvement of his right side with involvement of his right upper and right lower extremity. The shaking is usually followed by weakness, which lasts a few minutes before he regains full function. The weakness in his arms has led him to break dishes multiple times. Patient states that holding a weighted object may precipitate the episodes. He does not know how often the episodes occur, but says they are very frequent when he does his daily activities. He cannot recall exactly when the last one was. The patient has had multiple falls since June, most recently a couple weeks ago that he attributes to loss of balance. He denies syncope, loss of consciousness, tongue biting, and urinary incontinence. Patient denies previous history of a neurological disorder, including seizures. Patient does not ambulate with mechanical assistance at baseline. He lives alone and says that the episodes have not been witnessed.  Patient had an MRI brain without contrast yesterday after going to a private neurologist. He states that his friend Belinda ([phone]) will bring in CD with images to be reviewed.    Patient denies weight loss, fevers, chills, abdominal pain, cough, chest pain, nausea, vomiting, numbness, tingling, headaches, vision changes, sick contacts, confusion, memory difficulties, change in behavior/personality, and sleep disturbances.    Home medications:  Finasteride 5mg daily  Amlodipine 5mg daily  Simvastatin 10mg nightly  Tizanidine 2mg at bedtime PRN    PAST MEDICAL & SURGICAL HISTORY:  HLD (hyperlipidemia)  BPH (benign prostatic hyperplasia)  No significant past surgical history    Allergies  No Known Allergies    SHx - No smoking, No ETOH, No drug abuse    Review of Systems:  CONSTITUTIONAL:   HEENT:  No visual loss, blurred vision, double vision.  No hearing loss, sneezing, congestion, runny nose or sore throat.  SKIN:  No rash or itching.  CARDIOVASCULAR:  No chest pain, chest pressure or chest discomfort. No palpitations or edema.  RESPIRATORY:  No shortness of breath, cough or sputum.  GASTROINTESTINAL:  No anorexia, nausea, vomiting or diarrhea. No abdominal pain.  GENITOURINARY:  No dysuria. No increased frequency. No retention. No incontinence.  NEUROLOGICAL:  See HPI  MUSCULOSKELETAL:  No muscle, back pain, joint pain or stiffness. (21 Jul 2020 09:54)    Interval Hisxory:      PAST MEDICAL & SURGICAL HISTORY:  Hypertension  HLD (hyperlipidemia)  BPH (benign prostatic hyperplasia)  No significant past surgical history    FAMILY HISTORY:    Social Hx:  Nonsmoker, no drug or alcohol use    Home Medications:  amLODIPine 5 mg oral tablet: 1 tab(s) orally once a day (22 Jul 2020 00:46)  finasteride 5 mg oral tablet: 1 tab(s) orally once a day (22 Jul 2020 00:46)  simvastatin 10 mg oral tablet: 1 tab(s) orally once a day (at bedtime) (22 Jul 2020 00:46)  tiZANidine 2 mg oral capsule: orally prn (22 Jul 2020 00:46)    MEDICATIONS  (STANDING):  chlorhexidine 4% Liquid 1 Application(s) Topical <User Schedule>  cyanocobalamin Injectable 1000 MICROGram(s) IntraMuscular daily  enoxaparin Injectable 40 milliGRAM(s) SubCutaneous daily  finasteride 5 milliGRAM(s) Oral daily  lidocaine 1% Injectable 20 milliLiter(s) Local Injection once  simvastatin 10 milliGRAM(s) Oral at bedtime  sodium chloride 2 Gram(s) Oral every 8 hours  sodium chloride 0.9%. 1000 milliLiter(s) (95 mL/Hr) IV Continuous <Continuous>  valproate sodium IVPB 250 milliGRAM(s) IV Intermittent three times a day    MEDICATIONS  (PRN):  LORazepam   Injectable 2 milliGRAM(s) IV Push once PRN Seizure activity  polyethylene glycol 3350 17 Gram(s) Oral daily PRN Constipation  sodium chloride 0.9% lock flush 10 milliLiter(s) IV Push every 1 hour PRN Pre/post blood products, medications, blood draw, and to maintain line patency    Allergies  No Known Allergies    Intolerances      REVIEW OF SYSTEMS    ROS: Pertinent positives in HPI, all other ROS were reviewed and are negative.      Vital Signs Last 24 Hrs  T(C): 36.7 (26 Jul 2020 08:04), Max: 37 (25 Jul 2020 23:13)  T(F): 98.1 (26 Jul 2020 08:04), Max: 98.6 (25 Jul 2020 23:13)  HR: 59 (26 Jul 2020 08:04) (55 - 85)  BP: 114/67 (26 Jul 2020 08:04) (103/63 - 136/80)  BP(mean): --  RR: 18 (26 Jul 2020 08:04) (18 - 18)  SpO2: 95% (26 Jul 2020 08:04) (95% - 98%)    PE:  MS: Awake, alert. Normal affect. Follows all commands, answering questions    Language: Speech is clear, fluent with good comprehension     CNs: eyes moving spontaneously in all directions. well developed masseter muscles b/l. No facial asymmetry b/l. Symmetric palate elevation in midline. Gag reflex deferred. Tongue midline, normal movements, no atrophy.    Motor: Normal muscle bulk & tone. No noticeable tremor or seizure. No pronator drift.  Moving all extremities equally without laterality      Labs:   cbc                      14.1   7.81  )-----------( 204      ( 25 Jul 2020 06:30 )             42.4     [id number]    137  |  103  |  12  ----------------------------<  95  4.6   |  25  |  1.01    Ca    8.9      26 Jul 2020 05:45 [id number]  Patient is a 75y old  Male who presents with a chief complaint of limbic encephalitis (26 Jul 2020 08:14)    HPI:    76 y/o RH male with PMHx BPH, HTN, and HLD presents to ED with intermittent left upper extremity spasms and weakness since the first week of June. Patient reports that symptoms have progressed to include his right upper and lower extremity since late June. He states that in the beginning of June, he had pain below his left ear. A couple weeks later, patient started to have stiffness of his left upper extremity that felt like spasms and was accompanied by shaking. He then began to have involvement of his right side with involvement of his right upper and right lower extremity. The shaking is usually followed by weakness, which lasts a few minutes before he regains full function. The weakness in his arms has led him to break dishes multiple times. Patient states that holding a weighted object may precipitate the episodes. He does not know how often the episodes occur, but says they are very frequent when he does his daily activities. He cannot recall exactly when the last one was. The patient has had multiple falls since June, most recently a couple weeks ago that he attributes to loss of balance. He denies syncope, loss of consciousness, tongue biting, and urinary incontinence. Patient denies previous history of a neurological disorder, including seizures. Patient does not ambulate with mechanical assistance at baseline. He lives alone and says that the episodes have not been witnessed.  Patient had an MRI brain without contrast yesterday after going to a private neurologist. He states that his friend Belinda ([phone]) will bring in CD with images to be reviewed.    Patient denies weight loss, fevers, chills, abdominal pain, cough, chest pain, nausea, vomiting, numbness, tingling, headaches, vision changes, sick contacts, confusion, memory difficulties, change in behavior/personality, and sleep disturbances.    Interval History: no new events o/n      PAST MEDICAL & SURGICAL HISTORY:  Hypertension  HLD (hyperlipidemia)  BPH (benign prostatic hyperplasia)  No significant past surgical history    FAMILY HISTORY:    Social Hx:  Nonsmoker, no drug or alcohol use    Home Medications:  amLODIPine 5 mg oral tablet: 1 tab(s) orally once a day (22 Jul 2020 00:46)  finasteride 5 mg oral tablet: 1 tab(s) orally once a day (22 Jul 2020 00:46)  simvastatin 10 mg oral tablet: 1 tab(s) orally once a day (at bedtime) (22 Jul 2020 00:46)  tiZANidine 2 mg oral capsule: orally prn (22 Jul 2020 00:46)    MEDICATIONS  (STANDING):  chlorhexidine 4% Liquid 1 Application(s) Topical <User Schedule>  cyanocobalamin Injectable 1000 MICROGram(s) IntraMuscular daily  enoxaparin Injectable 40 milliGRAM(s) SubCutaneous daily  finasteride 5 milliGRAM(s) Oral daily  lidocaine 1% Injectable 20 milliLiter(s) Local Injection once  simvastatin 10 milliGRAM(s) Oral at bedtime  sodium chloride 2 Gram(s) Oral every 8 hours  sodium chloride 0.9%. 1000 milliLiter(s) (95 mL/Hr) IV Continuous <Continuous>  valproate sodium IVPB 250 milliGRAM(s) IV Intermittent three times a day    MEDICATIONS  (PRN):  LORazepam   Injectable 2 milliGRAM(s) IV Push once PRN Seizure activity  polyethylene glycol 3350 17 Gram(s) Oral daily PRN Constipation  sodium chloride 0.9% lock flush 10 milliLiter(s) IV Push every 1 hour PRN Pre/post blood products, medications, blood draw, and to maintain line patency    Allergies  No Known Allergies    Intolerances      REVIEW OF SYSTEMS    ROS: Pertinent positives in HPI, all other ROS were reviewed and are negative.      Vital Signs Last 24 Hrs  T(C): 36.7 (26 Jul 2020 08:04), Max: 37 (25 Jul 2020 23:13)  T(F): 98.1 (26 Jul 2020 08:04), Max: 98.6 (25 Jul 2020 23:13)  HR: 59 (26 Jul 2020 08:04) (55 - 85)  BP: 114/67 (26 Jul 2020 08:04) (103/63 - 136/80)  BP(mean): --  RR: 18 (26 Jul 2020 08:04) (18 - 18)  SpO2: 95% (26 Jul 2020 08:04) (95% - 98%)    PE:  MS: Awake, alert. Normal affect. Follows all commands, answering questions    Language: Speech is clear, fluent with good comprehension     CNs: eyes moving spontaneously in all directions. well developed masseter muscles b/l. No facial asymmetry b/l. Symmetric palate elevation in midline. Gag reflex deferred. Tongue midline, normal movements, no atrophy.    Motor: Normal muscle bulk & tone. No noticeable tremor or seizure. No pronator drift.  Moving all extremities equally without laterality      Labs:   cbc                      14.1   7.81  )-----------( 204      ( 25 Jul 2020 06:30 )             42.4     [id number]    137  |  103  |  12  ----------------------------<  95  4.6   |  25  |  1.01    Ca    8.9      26 Jul 2020 05:45

## 2020-07-27 LAB
ANION GAP SERPL CALC-SCNC: 11 MMOL/L — SIGNIFICANT CHANGE UP (ref 5–17)
BUN SERPL-MCNC: 13 MG/DL — SIGNIFICANT CHANGE UP (ref 7–23)
CALCIUM SERPL-MCNC: 8.8 MG/DL — SIGNIFICANT CHANGE UP (ref 8.4–10.5)
CHLORIDE SERPL-SCNC: 100 MMOL/L — SIGNIFICANT CHANGE UP (ref 96–108)
CO2 SERPL-SCNC: 26 MMOL/L — SIGNIFICANT CHANGE UP (ref 22–31)
CREAT SERPL-MCNC: 0.88 MG/DL — SIGNIFICANT CHANGE UP (ref 0.5–1.3)
GLUCOSE SERPL-MCNC: 106 MG/DL — HIGH (ref 70–99)
HCT VFR BLD CALC: 39.9 % — SIGNIFICANT CHANGE UP (ref 39–50)
HGB BLD-MCNC: 13.4 G/DL — SIGNIFICANT CHANGE UP (ref 13–17)
HOMOCYSTEINE LEVEL: 13.2 UMOL/L — HIGH
IF BLOCK AB SER-ACNC: SIGNIFICANT CHANGE UP
MCHC RBC-ENTMCNC: 30 PG — SIGNIFICANT CHANGE UP (ref 27–34)
MCHC RBC-ENTMCNC: 33.6 GM/DL — SIGNIFICANT CHANGE UP (ref 32–36)
MCV RBC AUTO: 89.3 FL — SIGNIFICANT CHANGE UP (ref 80–100)
METHYLMALONIC ACID LEVEL: 131 NMOL/L — SIGNIFICANT CHANGE UP (ref 87–318)
NRBC # BLD: 0 /100 WBCS — SIGNIFICANT CHANGE UP (ref 0–0)
PLATELET # BLD AUTO: 191 K/UL — SIGNIFICANT CHANGE UP (ref 150–400)
POTASSIUM SERPL-MCNC: 4.2 MMOL/L — SIGNIFICANT CHANGE UP (ref 3.5–5.3)
POTASSIUM SERPL-SCNC: 4.2 MMOL/L — SIGNIFICANT CHANGE UP (ref 3.5–5.3)
RBC # BLD: 4.47 M/UL — SIGNIFICANT CHANGE UP (ref 4.2–5.8)
RBC # FLD: 13.5 % — SIGNIFICANT CHANGE UP (ref 10.3–14.5)
SODIUM SERPL-SCNC: 137 MMOL/L — SIGNIFICANT CHANGE UP (ref 135–145)
WBC # BLD: 7.8 K/UL — SIGNIFICANT CHANGE UP (ref 3.8–10.5)
WBC # FLD AUTO: 7.8 K/UL — SIGNIFICANT CHANGE UP (ref 3.8–10.5)

## 2020-07-27 PROCEDURE — 36514 APHERESIS PLASMA: CPT

## 2020-07-27 PROCEDURE — 99233 SBSQ HOSP IP/OBS HIGH 50: CPT

## 2020-07-27 RX ADMIN — SIMVASTATIN 10 MILLIGRAM(S): 20 TABLET, FILM COATED ORAL at 21:20

## 2020-07-27 RX ADMIN — SODIUM CHLORIDE 2 GRAM(S): 9 INJECTION INTRAMUSCULAR; INTRAVENOUS; SUBCUTANEOUS at 05:00

## 2020-07-27 RX ADMIN — Medication 25 MILLIGRAM(S): at 14:04

## 2020-07-27 RX ADMIN — PREGABALIN 1000 MICROGRAM(S): 225 CAPSULE ORAL at 13:03

## 2020-07-27 RX ADMIN — Medication 25 MILLIGRAM(S): at 21:20

## 2020-07-27 RX ADMIN — ENOXAPARIN SODIUM 40 MILLIGRAM(S): 100 INJECTION SUBCUTANEOUS at 13:04

## 2020-07-27 RX ADMIN — FINASTERIDE 5 MILLIGRAM(S): 5 TABLET, FILM COATED ORAL at 21:19

## 2020-07-27 RX ADMIN — Medication 25 MILLIGRAM(S): at 05:00

## 2020-07-27 RX ADMIN — CHLORHEXIDINE GLUCONATE 1 APPLICATION(S): 213 SOLUTION TOPICAL at 05:01

## 2020-07-27 NOTE — DIETITIAN INITIAL EVALUATION ADULT. - REASON INDICATOR FOR ASSESSMENT
Pt seen for length of stay.   Source: pt and EMR  Pertinent chart information: 75y old  Male who presents with a chief complaint of limbic encephalitis

## 2020-07-27 NOTE — DIETITIAN INITIAL EVALUATION ADULT. - OTHER INFO
Visited pt at bedside. Pt reports having a good appetite both PTA and in-house; consuming >75% of most meals. Pt denies any known food allergies or intolerances. Pt does endorse some chewing difficulty stating that the food sometimes gets stuck in between gums and causes some pain/discomfort. Pt denies any swallowing difficulty, self-feeding difficulty, nausea/vomiting, constipation, or diarrhea. Pt denies any micronutrient supplementation at home. Pt reports UBW of 185lbs; consistent with dosing weight. Denies any recent weight changes.     Education: pt made aware of option for soft diet to help minimize chewing difficulty; pt amenable to trial.

## 2020-07-27 NOTE — PROGRESS NOTE ADULT - SUBJECTIVE AND OBJECTIVE BOX
Patient is a 75y old  Male who presents with a chief complaint of limbic encephalitis (27 Jul 2020 09:48)      SUBJECTIVE / OVERNIGHT EVENTS:    Patient seen and examined. denies cp sob. undergoing plasmapheresis.      Vital Signs Last 24 Hrs  T(C): 36.4 (27 Jul 2020 07:37), Max: 37.2 (26 Jul 2020 19:28)  T(F): 97.5 (27 Jul 2020 07:37), Max: 99 (26 Jul 2020 19:28)  HR: 56 (27 Jul 2020 07:37) (56 - 64)  BP: 129/73 (27 Jul 2020 07:37) (103/67 - 129/73)  BP(mean): --  RR: 18 (27 Jul 2020 07:37) (17 - 18)  SpO2: 97% (27 Jul 2020 07:37) (95% - 98%)  I&O's Summary    26 Jul 2020 07:01  -  27 Jul 2020 07:00  --------------------------------------------------------  IN: 580 mL / OUT: 0 mL / NET: 580 mL        PE:  GENERAL: NAD, AAOx3  HEAD:  Atraumatic, Normocephalic  CHEST/LUNG: CTABL, No wheeze  HEART: Regular rate and rhythm; no murmur  ABDOMEN: Soft, Nontender, Nondistended; Bowel sounds present  EXTREMITIES:  2+ Peripheral Pulses, No clubbing, cyanosis, or edema  SKIN: No rashes or lesions, right CVC    LABS:                        13.4   7.80  )-----------( 191      ( 27 Jul 2020 08:58 )             39.9     07-27    137  |  100  |  13  ----------------------------<  106<H>  4.2   |  26  |  0.88    Ca    8.8      27 Jul 2020 08:58        CAPILLARY BLOOD GLUCOSE      POCT Blood Glucose.: 122 mg/dL (26 Jul 2020 17:29)            RADIOLOGY & ADDITIONAL TESTS:    Imaging Personally Reviewed:  [x] YES  [ ] NO    Consultant(s) Notes Reviewed:  [x] YES  [ ] NO    MEDICATIONS  (STANDING):  chlorhexidine 4% Liquid 1 Application(s) Topical <User Schedule>  cyanocobalamin Injectable 1000 MICROGram(s) IntraMuscular daily  enoxaparin Injectable 40 milliGRAM(s) SubCutaneous daily  finasteride 5 milliGRAM(s) Oral daily  lidocaine 1% Injectable 20 milliLiter(s) Local Injection once  simvastatin 10 milliGRAM(s) Oral at bedtime  sodium chloride 0.9%. 1000 milliLiter(s) (95 mL/Hr) IV Continuous <Continuous>  valproate sodium IVPB 250 milliGRAM(s) IV Intermittent three times a day    MEDICATIONS  (PRN):  LORazepam   Injectable 2 milliGRAM(s) IV Push once PRN Seizure activity  polyethylene glycol 3350 17 Gram(s) Oral daily PRN Constipation  sodium chloride 0.9% lock flush 10 milliLiter(s) IV Push every 1 hour PRN Pre/post blood products, medications, blood draw, and to maintain line patency      Care Discussed with Consultants/Other Providers [x] YES  [ ] NO    HEALTH ISSUES - PROBLEM Dx:  Limbic encephalitis: Limbic encephalitis  Bradycardia: Bradycardia

## 2020-07-27 NOTE — PROGRESS NOTE ADULT - ASSESSMENT
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

## 2020-07-27 NOTE — DIETITIAN INITIAL EVALUATION ADULT. - PHYSICAL APPEARANCE
other (specify)/well nourished/Pt not amenable to Nutrition-focused physical exam at this time. Ht: 71in, Wt: 184lbs, BMI: 25.8kg/m2, IBW: 172lbs +/- 10%  Edema: none noted  Skin per nursing flowsheets: no pressure injury documented

## 2020-07-27 NOTE — PROGRESS NOTE ADULT - ASSESSMENT
76 y/o M with PMHx BPH, HTN, and HLD presented to ED with intermittent left upper extremity spasms and weakness since the first week of June. His symptoms progressed to his right upper and lower extremity in late June with weakness, spasms and shaking. The weakness led to difficulty holding objects. He also had multiple falls. Outpatient MRI brain without contrast demonstrated hyperenhancement of right limbic lobe. 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  Autoimmune/paraneoplastic panels pending.     s/p PLEX#2 on 7/25, tolerated well.    PLEX#3 of 5 today, 1 plasma volume with 5% albumin as replacement solution.    PLEX#4 currently scheduled for 7/27.

## 2020-07-27 NOTE — PROGRESS NOTE ADULT - SUBJECTIVE AND OBJECTIVE BOX
Neurology Progress Note      the patient's symptoms  ---abnl movements continue    MEDICATIONS  (STANDING):  chlorhexidine 4% Liquid 1 Application(s) Topical <User Schedule>  cyanocobalamin Injectable 1000 MICROGram(s) IntraMuscular daily  enoxaparin Injectable 40 milliGRAM(s) SubCutaneous daily  finasteride 5 milliGRAM(s) Oral daily  lidocaine 1% Injectable 20 milliLiter(s) Local Injection once  simvastatin 10 milliGRAM(s) Oral at bedtime  sodium chloride 0.9%. 1000 milliLiter(s) (95 mL/Hr) IV Continuous <Continuous>  valproate sodium IVPB 250 milliGRAM(s) IV Intermittent three times a day    MEDICATIONS  (PRN):  LORazepam   Injectable 2 milliGRAM(s) IV Push once PRN Seizure activity  polyethylene glycol 3350 17 Gram(s) Oral daily PRN Constipation  sodium chloride 0.9% lock flush 10 milliLiter(s) IV Push every 1 hour PRN Pre/post blood products, medications, blood draw, and to maintain line patency              Vital Signs Last 24 Hrs  T(C): 36.4 (27 Jul 2020 07:37), Max: 37.2 (26 Jul 2020 19:28)  T(F): 97.5 (27 Jul 2020 07:37), Max: 99 (26 Jul 2020 19:28)  HR: 56 (27 Jul 2020 07:37) (56 - 64)  BP: 129/73 (27 Jul 2020 07:37) (103/67 - 129/73)  BP(mean): --  RR: 18 (27 Jul 2020 07:37) (17 - 18)  SpO2: 97% (27 Jul 2020 07:37) (95% - 98%)    Physical exam  MENTAL STATUS- Alert, attends, fluent, non-dysarthric, follows commands, oriented, good memory and affect.  CRANIAL NERVES-II Optic - Bilateral - Normal Visual Fields. III-IV-VI EOMI & Pupils - Bilateral - Normal Bilaterally. V Trigeminal - Bilateral - Normal bilateral facial sensation and jaw movement. VII Facial - Normal bilateral facial movement. VIII Acoustic - Bilateral - Hearing normal to voice bilaterally. IX Glossopharyngeal / X Vagus - Normal palate elevates symmetrically. XI Accessory - Normal Bilaterally. XII Hypoglossal - Tongue protrudes midline and moves symmetrically.  MOTOR-Bulk and Contour - Normal. Tone - Normal tone, no abnormal movements. Strength - 5/5 normal muscle strength - Strength full throughout.  SENSORY-Normal - LT, DSS, Vibration.  REFLEXES- DTR's 2+ throughout.  COORDINATION-Normal FFM, SAMANTHA, FNF - bilaterally.  GAIT-Normal base, heel, toe, tandem.        07-26    137  |  103  |  12  ----------------------------<  95  4.6   |  25  |  1.01    Ca    8.9      26 Jul 2020 05:45                radiology type ( ~?rad)

## 2020-07-27 NOTE — PROGRESS NOTE ADULT - ASSESSMENT
76 yo male PMHx BPH, HTN, and HLD presents to ED with intermittent left upper extremity tonic clonic activity with progression to right upper and lower extremities and multiple falls since June, concerning for seizures. Outpatient MRI brain without contrast demonstrates hyperenhancement of right limbic lobe.     # Bilateral upper extremity tonic clonic activity   # autoimmune encephalitis  # HTN  # HLD  # BPH  # lung nodules and tree in bud opacities    Appreciate neurology care  Follow up serum autoimmune workup  cont proscar  cont statin  holding amlodipine  cont PLEX per neuro    PCP Dr. Tevin Ovalle    Please call [facility] with questions [phone].

## 2020-07-27 NOTE — PROGRESS NOTE ADULT - SUBJECTIVE AND OBJECTIVE BOX
Patient is a 75y old  Male who presents with a chief complaint of limbic encephalitis (27 Jul 2020 09:44)      HPI:  74 y/o M with PMHx BPH, HTN, and HLD presented to ED with intermittent left upper extremity spasms and weakness since the first week of June. His symptoms progressed to his right upper and lower extremity in late June with weakness, spasms and shaking. The weakness led to difficulty holding objects. He also had multiple falls. Outpatient MRI brain without contrast demonstrated hyperenhancement of right limbic lobe. 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  Autoimmune/paraneoplastic panels pending.     Interval events: s/p PLEX#2 on 7/25, tolerated well. No acute events overnight. Endorses that he is "walking a little better" otherwise symptoms unchanged. He denies, fever, chills, HA, numbness, visual changes, CP, SOB, cough, bleeding, GI or  changes.      PAST MEDICAL & SURGICAL HISTORY:  Hypertension  HLD (hyperlipidemia)  BPH (benign prostatic hyperplasia)  No significant past surgical history      MEDICATIONS  (STANDING):  chlorhexidine 4% Liquid 1 Application(s) Topical <User Schedule>  cyanocobalamin Injectable 1000 MICROGram(s) IntraMuscular daily  enoxaparin Injectable 40 milliGRAM(s) SubCutaneous daily  finasteride 5 milliGRAM(s) Oral daily  lidocaine 1% Injectable 20 milliLiter(s) Local Injection once  simvastatin 10 milliGRAM(s) Oral at bedtime  sodium chloride 0.9%. 1000 milliLiter(s) (95 mL/Hr) IV Continuous <Continuous>  valproate sodium IVPB 250 milliGRAM(s) IV Intermittent three times a day    MEDICATIONS  (PRN):  LORazepam   Injectable 2 milliGRAM(s) IV Push once PRN Seizure activity  polyethylene glycol 3350 17 Gram(s) Oral daily PRN Constipation  sodium chloride 0.9% lock flush 10 milliLiter(s) IV Push every 1 hour PRN Pre/post blood products, medications, blood draw, and to maintain line patency      Allergies  No Known Allergies      REVIEW OF SYSTEMS:  CONSTITUTIONAL: No fevers or chills  EYES/ENT: No visual changes,  No vertigo or throat pain   RESPIRATORY: No shortness of breath, No cough, wheezing, hemoptysis   CARDIOVASCULAR: No chest pain or palpitations  GASTROINTESTINAL: No pain. No nausea, vomiting, or hematemesis; No diarrhea or constipation. No melena or hematochezia  GENITOURINARY: No pain, No hematuria  MUSCULOSKELETAL: No joint or muscle pain  NEUROLOGICAL: See HPI, No HA, No numbness  HEMATOLOGY: No bleeding  SKIN: No itching, burning, rashes, petechia, or lesions  ENDOCRINE: No heat or cold intolerance      Vital Signs Last 24 Hrs  T(C): 36.4 (27 Jul 2020 07:37), Max: 37.2 (26 Jul 2020 19:28)  T(F): 97.5 (27 Jul 2020 07:37), Max: 99 (26 Jul 2020 19:28)  HR: 56 (27 Jul 2020 07:37) (56 - 64)  BP: 129/73 (27 Jul 2020 07:37) (103/67 - 129/73)  RR: 18 (27 Jul 2020 07:37) (17 - 18)  SpO2: 97% (27 Jul 2020 07:37) (95% - 98%)                            13.4   7.80  )-----------( 191      ( 27 Jul 2020 08:58 )             39.9       Hematocrit: 39.9 % (07-27 @ 08:58)  Hematocrit: 42.4 % (07-25 @ 06:30)    07-27    137  |  100  |  13  ----------------------------<  106<H>  4.2   |  26  |  0.88    Ca    8.8      27 Jul 2020 08:58    Fibrinogen Assay: 500 mg/dL (07-25 @ 08:46)

## 2020-07-27 NOTE — PROGRESS NOTE ADULT - SUBJECTIVE AND OBJECTIVE BOX
Subjective: Patient seen and examined. No new events except as noted.   No cp or sob   + spasms   REVIEW OF SYSTEMS:    CONSTITUTIONAL:+ weakness, fevers or chills  EYES/ENT: No visual changes;  No vertigo or throat pain   NECK: No pain or stiffness  RESPIRATORY: No cough, wheezing, hemoptysis; No shortness of breath  CARDIOVASCULAR: No chest pain or palpitations  GASTROINTESTINAL: No abdominal or epigastric pain. No nausea, vomiting, or hematemesis; No diarrhea or constipation. No melena or hematochezia.  GENITOURINARY: No dysuria, frequency or hematuria  NEUROLOGICAL: No numbness or weakness  SKIN: No itching, burning, rashes, or lesions   All other review of systems is negative unless indicated above.    MEDICATIONS:  MEDICATIONS  (STANDING):  chlorhexidine 4% Liquid 1 Application(s) Topical <User Schedule>  cyanocobalamin Injectable 1000 MICROGram(s) IntraMuscular daily  enoxaparin Injectable 40 milliGRAM(s) SubCutaneous daily  finasteride 5 milliGRAM(s) Oral daily  lidocaine 1% Injectable 20 milliLiter(s) Local Injection once  simvastatin 10 milliGRAM(s) Oral at bedtime  sodium chloride 0.9%. 1000 milliLiter(s) (95 mL/Hr) IV Continuous <Continuous>  valproate sodium IVPB 250 milliGRAM(s) IV Intermittent three times a day      PHYSICAL EXAM:  T(C): 36.4 (07-27-20 @ 07:37), Max: 37.2 (07-26-20 @ 19:28)  HR: 56 (07-27-20 @ 07:37) (56 - 64)  BP: 129/73 (07-27-20 @ 07:37) (103/67 - 129/73)  RR: 18 (07-27-20 @ 07:37) (17 - 18)  SpO2: 97% (07-27-20 @ 07:37) (95% - 98%)  Wt(kg): --  I&O's Summary    26 Jul 2020 07:01  -  27 Jul 2020 07:00  --------------------------------------------------------  IN: 580 mL / OUT: 0 mL / NET: 580 mL          Appearance: Normal	  HEENT:   Normal oral mucosa, PERRL, EOMI	  Lymphatic: No lymphadenopathy , no edema  Cardiovascular: Normal S1 S2, No JVD, No murmurs , Peripheral pulses palpable 2+ bilaterally  Respiratory: Lungs clear to auscultation, normal effort 	  Gastrointestinal:  Soft, Non-tender, + BS	  Skin: No rashes, No ecchymoses, No cyanosis, warm to touch  Musculoskeletal: Normal range of motion, normal strength  Psychiatry:  Mood & affect appropriate  Ext: No edema      LABS:    CARDIAC MARKERS:                                13.4   7.80  )-----------( 191      ( 27 Jul 2020 08:58 )             39.9     07-27    137  |  100  |  13  ----------------------------<  106<H>  4.2   |  26  |  0.88    Ca    8.8      27 Jul 2020 08:58      proBNP:   Lipid Profile:   HgA1c:   TSH:             TELEMETRY: 	    ECG:  	  RADIOLOGY:   DIAGNOSTIC TESTING:  [ ] Echocardiogram:  [ ]  Catheterization:  [ ] Stress Test:    OTHER:

## 2020-07-27 NOTE — DIETITIAN INITIAL EVALUATION ADULT. - PERTINENT MEDS FT
MEDICATIONS  (STANDING):  chlorhexidine 4% Liquid 1 Application(s) Topical <User Schedule>  cyanocobalamin Injectable 1000 MICROGram(s) IntraMuscular daily  enoxaparin Injectable 40 milliGRAM(s) SubCutaneous daily  finasteride 5 milliGRAM(s) Oral daily  lidocaine 1% Injectable 20 milliLiter(s) Local Injection once  simvastatin 10 milliGRAM(s) Oral at bedtime  sodium chloride 0.9%. 1000 milliLiter(s) (95 mL/Hr) IV Continuous <Continuous>  valproate sodium IVPB 250 milliGRAM(s) IV Intermittent three times a day    MEDICATIONS  (PRN):  LORazepam   Injectable 2 milliGRAM(s) IV Push once PRN Seizure activity  polyethylene glycol 3350 17 Gram(s) Oral daily PRN Constipation  sodium chloride 0.9% lock flush 10 milliLiter(s) IV Push every 1 hour PRN Pre/post blood products, medications, blood draw, and to maintain line patency

## 2020-07-27 NOTE — PROGRESS NOTE ADULT - SUBJECTIVE AND OBJECTIVE BOX
Neurology  Progress Note  07-27-20    Name:  DENA FARNSWORTH; 75y    Interval History: No overnight events. Patient reports constipation with last bowel movement 3 days ago, but denies acute complaints including pain, visual disturbances, weakness, numbness, tonic-clonic movements, tongue biting, urinary or bowel incontinence.    HPI:  74 y/o RH male with PMHx BPH, HTN, and HLD presents to ED with intermittent left upper extremity spasms and weakness since the first week of June. Patient reports that symptoms have progressed to include his right upper and lower extremity since late June. He states that in the beginning of June, he had pain below his left ear. A couple weeks later, patient started to have stiffness of his left upper extremity that felt like spasms and was accompanied by shaking. He then began to have involvement of his right side with involvement of his right upper and right lower extremity. The shaking is usually followed by weakness, which lasts a few minutes before he regains full function. The weakness in his arms has led him to break dishes multiple times. Patient states that holding a weighted object may precipitate the episodes. He does not know how often the episodes occur, but says they are very frequent when he does his daily activities. He cannot recall exactly when the last one was. The patient has had multiple falls since June, most recently a couple weeks ago that he attributes to loss of balance. He denies syncope, loss of consciousness, tongue biting, and urinary incontinence. Patient denies previous history of a neurological disorder, including seizures. Patient does not ambulate with mechanical assistance at baseline. He lives alone and says that the episodes have not been witnessed.  Patient had an MRI brain without contrast yesterday after going to a private neurologist. He states that his friend Belinda ([phone]) will bring in CD with images to be reviewed.    Patient denies weight loss, fevers, chills, abdominal pain, cough, chest pain, nausea, vomiting, numbness, tingling, headaches, vision changes, sick contacts, confusion, memory difficulties, change in behavior/personality, and sleep disturbances.    Home medications:  Finasteride 5mg daily  Amlodipine 5mg daily  Simvastatin 10mg nightly  Tizanidine 2mg at bedtime PRN    PAST MEDICAL & SURGICAL HISTORY:  HLD (hyperlipidemia)  BPH (benign prostatic hyperplasia)  No significant past surgical history    Allergies  No Known Allergies    SHx - No smoking, No ETOH, No drug abuse    REVIEW OF SYSTEMS:  As states in HPI.    Medications:  chlorhexidine 4% Liquid 1 Application(s) Topical <User Schedule>  cyanocobalamin Injectable 1000 MICROGram(s) IntraMuscular daily  enoxaparin Injectable 40 milliGRAM(s) SubCutaneous daily  finasteride 5 milliGRAM(s) Oral daily  lidocaine 1% Injectable 20 milliLiter(s) Local Injection once  LORazepam   Injectable 1 milliGRAM(s) IV Push once PRN  LORazepam   Injectable 2 milliGRAM(s) IV Push once PRN  polyethylene glycol 3350 17 Gram(s) Oral daily PRN  simvastatin 10 milliGRAM(s) Oral at bedtime  sodium chloride 2 Gram(s) Oral every 8 hours  sodium chloride 0.9% lock flush 10 milliLiter(s) IV Push every 1 hour PRN  sodium chloride 0.9%. 1000 milliLiter(s) IV Continuous <Continuous>  valproate sodium IVPB 250 milliGRAM(s) IV Intermittent three times a day    Vitals:  T(C): 36.4 (07-27-20 @ 07:37), Max: 37.2 (07-26-20 @ 19:28)  HR: 56 (07-27-20 @ 07:37) (56 - 64)  BP: 129/73 (07-27-20 @ 07:37) (103/67 - 129/73)  RR: 18 (07-27-20 @ 07:37) (17 - 18)  SpO2: 97% (07-27-20 @ 07:37) (95% - 98%)    Labs:    07-26    137  |  103  |  12  ----------------------------<  95  4.6   |  25  |  1.01    Ca    8.9      26 Jul 2020 05:45      CAPILLARY BLOOD GLUCOSE      POCT Blood Glucose.: 122 mg/dL (26 Jul 2020 17:29)          CSF:    Total Nucleated Cell Count, CSF: 1 /uL (07-23-20 @ 08:24)  RBC Count - Spinal Fluid: 0 /uL (07-23-20 @ 08:24)  Protein, CSF: 18 mg/dL (07-23-20 @ 13:53)  Protein, CSF: 18 mg/dL (07-23-20 @ 08:25)        PHYSICAL EXAMINATION:  General: Well-developed, well nourished, in no acute distress.  Neurologic:  - Mental Status:  Alert, awake, oriented to person, place, and time; Speech is fluent with intact comprehension. Answering all questions.  - Cranial Nerves II-XII: Eyes moving spontaneously in all directions without nystagmus. Facial sensation is intact in the V1-V3 distribution bilaterally.  No facial asymmetry with eye closure and smile. Hearing is intact to finger rub. Head turning and shoulder shrug are intact.  - Motor:  Moving all extremities spontaneously. Strength is 5/5 in all extremities.  There is no noticeable tremor or prontator drift.  Normal muscle bulk and tone throughout.  - Sensory:  Intact throughout to LT  - Gait:   deferred    Radiology:  < from: MR Head w/wo IV Cont (07.22.20 @ 18:41) >  Redemonstration of T2 and FLAIR hyperintense signal within the mesial right temporal lobe in the region of the hippocampal and parahippocampal gyri, with associated subtle swelling. Differential diagnosis includes autoimmune encephalitis, herpes encephalitis, low-grade glioblastoma, and sequelae of recentseizure.    No abnormal parenchymal or leptomeningeal enhancement.    No acute intracranial hemorrhage or acute infarction. Neurology  Progress Note  07-27-20    Name:  DENA FARNSWORTH; 75y    Interval History: No overnight events. Patient reports constipation with last bowel movement 3 days ago, but denies acute complaints including pain, visual disturbances, weakness, numbness, tonic-clonic movements, tongue biting, urinary or bowel incontinence.    HPI:  74 y/o RH male with PMHx BPH, HTN, and HLD presents to ED with intermittent left upper extremity spasms and weakness since the first week of June. Patient reports that symptoms have progressed to include his right upper and lower extremity since late June. He states that in the beginning of June, he had pain below his left ear. A couple weeks later, patient started to have stiffness of his left upper extremity that felt like spasms and was accompanied by shaking. He then began to have involvement of his right side with involvement of his right upper and right lower extremity. The shaking is usually followed by weakness, which lasts a few minutes before he regains full function. The weakness in his arms has led him to break dishes multiple times. Patient states that holding a weighted object may precipitate the episodes. He does not know how often the episodes occur, but says they are very frequent when he does his daily activities. He cannot recall exactly when the last one was. The patient has had multiple falls since June, most recently a couple weeks ago that he attributes to loss of balance. He denies syncope, loss of consciousness, tongue biting, and urinary incontinence. Patient denies previous history of a neurological disorder, including seizures. Patient does not ambulate with mechanical assistance at baseline. He lives alone and says that the episodes have not been witnessed.  Patient had an MRI brain without contrast yesterday after going to a private neurologist. He states that his friend Belinda ([phone]) will bring in CD with images to be reviewed.    Patient denies weight loss, fevers, chills, abdominal pain, cough, chest pain, nausea, vomiting, numbness, tingling, headaches, vision changes, sick contacts, confusion, memory difficulties, change in behavior/personality, and sleep disturbances.    Home medications:  Finasteride 5mg daily  Amlodipine 5mg daily  Simvastatin 10mg nightly  Tizanidine 2mg at bedtime PRN    PAST MEDICAL & SURGICAL HISTORY:  HLD (hyperlipidemia)  BPH (benign prostatic hyperplasia)  No significant past surgical history    Allergies  No Known Allergies    SHx - No smoking, No ETOH, No drug abuse    REVIEW OF SYSTEMS:  As states in HPI.    Medications:  chlorhexidine 4% Liquid 1 Application(s) Topical <User Schedule>  cyanocobalamin Injectable 1000 MICROGram(s) IntraMuscular daily  enoxaparin Injectable 40 milliGRAM(s) SubCutaneous daily  finasteride 5 milliGRAM(s) Oral daily  lidocaine 1% Injectable 20 milliLiter(s) Local Injection once  LORazepam   Injectable 1 milliGRAM(s) IV Push once PRN  LORazepam   Injectable 2 milliGRAM(s) IV Push once PRN  polyethylene glycol 3350 17 Gram(s) Oral daily PRN  simvastatin 10 milliGRAM(s) Oral at bedtime  sodium chloride 2 Gram(s) Oral every 8 hours  sodium chloride 0.9% lock flush 10 milliLiter(s) IV Push every 1 hour PRN  sodium chloride 0.9%. 1000 milliLiter(s) IV Continuous <Continuous>  valproate sodium IVPB 250 milliGRAM(s) IV Intermittent three times a day    Vitals:  T(C): 36.4 (07-27-20 @ 07:37), Max: 37.2 (07-26-20 @ 19:28)  HR: 56 (07-27-20 @ 07:37) (56 - 64)  BP: 129/73 (07-27-20 @ 07:37) (103/67 - 129/73)  RR: 18 (07-27-20 @ 07:37) (17 - 18)  SpO2: 97% (07-27-20 @ 07:37) (95% - 98%)    Labs:    07-26    137  |  103  |  12  ----------------------------<  95  4.6   |  25  |  1.01    Ca    8.9      26 Jul 2020 05:45      CAPILLARY BLOOD GLUCOSE    POCT Blood Glucose.: 122 mg/dL (26 Jul 2020 17:29)    CSF:    Total Nucleated Cell Count, CSF: 1 /uL (07-23-20 @ 08:24)  RBC Count - Spinal Fluid: 0 /uL (07-23-20 @ 08:24)  Protein, CSF: 18 mg/dL (07-23-20 @ 13:53)  Protein, CSF: 18 mg/dL (07-23-20 @ 08:25)        PHYSICAL EXAMINATION:  General: Well-developed, well nourished, in no acute distress.  Neurologic:  - Mental Status:  Alert, awake, oriented to person, place, and time; Speech is fluent with intact comprehension. Answering all questions.  - Cranial Nerves II-XII: Eyes moving spontaneously in all directions without nystagmus. Facial sensation is intact in the V1-V3 distribution bilaterally.  No facial asymmetry with eye closure and smile. Hearing is intact to finger rub. Head turning and shoulder shrug are intact.  - Motor:  Moving all extremities spontaneously. Strength is 5/5 in all extremities.  There is no noticeable tremor or prontator drift.  Normal muscle bulk and tone throughout.  - Sensory:  Intact throughout to LT  - Gait:   deferred    Radiology:  < from: MR Head w/wo IV Cont (07.22.20 @ 18:41) >  Redemonstration of T2 and FLAIR hyperintense signal within the mesial right temporal lobe in the region of the hippocampal and parahippocampal gyri, with associated subtle swelling. Differential diagnosis includes autoimmune encephalitis, herpes encephalitis, low-grade glioblastoma, and sequelae of recentseizure.    No abnormal parenchymal or leptomeningeal enhancement.    No acute intracranial hemorrhage or acute infarction.

## 2020-07-28 LAB
APPEARANCE UR: CLEAR — SIGNIFICANT CHANGE UP
BILIRUB UR-MCNC: NEGATIVE — SIGNIFICANT CHANGE UP
COLOR SPEC: SIGNIFICANT CHANGE UP
DIFF PNL FLD: NEGATIVE — SIGNIFICANT CHANGE UP
GLUCOSE UR QL: NEGATIVE — SIGNIFICANT CHANGE UP
KETONES UR-MCNC: NEGATIVE — SIGNIFICANT CHANGE UP
LEUKOCYTE ESTERASE UR-ACNC: NEGATIVE — SIGNIFICANT CHANGE UP
NITRITE UR-MCNC: NEGATIVE — SIGNIFICANT CHANGE UP
OLIGOCLONAL BANDS CSF ELPH-IMP: SIGNIFICANT CHANGE UP
PH UR: 5.5 — SIGNIFICANT CHANGE UP (ref 5–8)
PROT UR-MCNC: NEGATIVE — SIGNIFICANT CHANGE UP
SP GR SPEC: 1.01 — SIGNIFICANT CHANGE UP (ref 1.01–1.02)
UROBILINOGEN FLD QL: SIGNIFICANT CHANGE UP

## 2020-07-28 PROCEDURE — 99233 SBSQ HOSP IP/OBS HIGH 50: CPT

## 2020-07-28 PROCEDURE — 95720 EEG PHY/QHP EA INCR W/VEEG: CPT

## 2020-07-28 RX ORDER — VALPROIC ACID (AS SODIUM SALT) 250 MG/5ML
250 SOLUTION, ORAL ORAL THREE TIMES A DAY
Refills: 0 | Status: DISCONTINUED | OUTPATIENT
Start: 2020-07-28 | End: 2020-07-30

## 2020-07-28 RX ADMIN — Medication 250 MILLIGRAM(S): at 13:47

## 2020-07-28 RX ADMIN — ENOXAPARIN SODIUM 40 MILLIGRAM(S): 100 INJECTION SUBCUTANEOUS at 11:29

## 2020-07-28 RX ADMIN — FINASTERIDE 5 MILLIGRAM(S): 5 TABLET, FILM COATED ORAL at 11:29

## 2020-07-28 RX ADMIN — PREGABALIN 1000 MICROGRAM(S): 225 CAPSULE ORAL at 11:29

## 2020-07-28 RX ADMIN — CHLORHEXIDINE GLUCONATE 1 APPLICATION(S): 213 SOLUTION TOPICAL at 05:13

## 2020-07-28 RX ADMIN — SIMVASTATIN 10 MILLIGRAM(S): 20 TABLET, FILM COATED ORAL at 21:58

## 2020-07-28 RX ADMIN — Medication 25 MILLIGRAM(S): at 05:13

## 2020-07-28 RX ADMIN — Medication 250 MILLIGRAM(S): at 21:58

## 2020-07-28 NOTE — PROGRESS NOTE ADULT - ASSESSMENT
76yo man with PMHx BPH, HTN, and HLD presents to ED with intermittent left upper extremity tonic clonic activity with progression to right upper and lower extremities and multiple falls since June, concerning for seizures. Outpatient MRI brain without contrast demonstrates hyperenhancement of right limbic lobe.     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 Differentials also includes autoimmune encephalitis paraneoplastic encephalitis vs. other inflammatory encephalitis vs. infectious encephalitis vs. less likely focal dystonia.    Plan:  [] PLEX therapy, kendell mercado placed by IR, total 5 sessions scheduled, first session 7/23  [] valproate 250mg tid  [] Follow up serum autoimmune workup, encephalopathy panel, paraneoplastic panel, LG1b, NMDA, voltage gated K, protein electropheresis  [] home amlodipine held due to low BP and PLEX therapy, can restart if patient becomes hypertensive  [x] Repeat MRI brain with contrast, official read as above  [x] Report of outside MRI as above, read by in house neuroradiology noting enhancement in R limbic area with broad differential of autoimmune vs infectious, concerning for limbic encephalitis. C-spine MRI with chronic degenerative changes, no acute lesions noted  [x] pulm c/s - no further imaging needed for pulmonary nodules  [] EEG, report as above, no longer connected to EEG. Repeat EEG Thursday-Friday prior to discharge to evaluate resolution  [x] PT/OT evaluation: no skilled PT needs    Case discussed with attending Dr. Justice. 76yo man with PMHx BPH, HTN, and HLD presents to ED with intermittent left upper extremity tonic clonic activity with progression to right upper and lower extremities and multiple falls since June, concerning for seizures. Outpatient MRI brain without contrast demonstrates hyperenhancement of right limbic lobe.     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 Differentials also includes autoimmune encephalitis paraneoplastic encephalitis vs. other inflammatory encephalitis vs. infectious encephalitis vs. less likely focal dystonia.    Plan:  [] PLEX therapy, kendell mercado placed by IR, total 5 sessions scheduled, first session 7/23  [] valproate 250mg PO TID  [] Follow up serum autoimmune workup, encephalopathy panel, paraneoplastic panel, LG1b, NMDA, voltage gated K, protein electropheresis  [] home amlodipine held due to low BP and PLEX therapy, can restart if patient becomes hypertensive  [x] UA: negative  [x] Repeat MRI brain with contrast, official read as above  [x] Report of outside MRI as above, read by in house neuroradiology noting enhancement in R limbic area with broad differential of autoimmune vs infectious, concerning for limbic encephalitis. C-spine MRI with chronic degenerative changes, no acute lesions noted  [x] pulm c/s - no further imaging needed for pulmonary nodules  [] EEG, report as above. Repeat EEG in light of overnight events.  [x] PT/OT evaluation: no skilled PT needs    Case discussed with attending Dr. Justice.

## 2020-07-28 NOTE — PROGRESS NOTE ADULT - SUBJECTIVE AND OBJECTIVE BOX
INCOMPLETE    Neurology  Progress Note  07-28-20    Name:  DENA FARNSWORTH; 75y    Interval History: No overnight events. Patient continues to reports increased urinary frequency, but denies acute complaints.    HPI:  HPI:  74 y/o RH male with PMHx BPH, HTN, and HLD presents to ED with intermittent left upper extremity spasms and weakness since the first week of June. Patient reports that symptoms have progressed to include his right upper and lower extremity since late June. He states that in the beginning of June, he had pain below his left ear. A couple weeks later, patient started to have stiffness of his left upper extremity that felt like spasms and was accompanied by shaking. He then began to have involvement of his right side with involvement of his right upper and right lower extremity. The shaking is usually followed by weakness, which lasts a few minutes before he regains full function. The weakness in his arms has led him to break dishes multiple times. Patient states that holding a weighted object may precipitate the episodes. He does not know how often the episodes occur, but says they are very frequent when he does his daily activities. He cannot recall exactly when the last one was. The patient has had multiple falls since June, most recently a couple weeks ago that he attributes to loss of balance. He denies syncope, loss of consciousness, tongue biting, and urinary incontinence. Patient denies previous history of a neurological disorder, including seizures. Patient does not ambulate with mechanical assistance at baseline. He lives alone and says that the episodes have not been witnessed.  Patient had an MRI brain without contrast yesterday after going to a private neurologist. He states that his friend Belinda ([phone]) will bring in CD with images to be reviewed.    Patient denies weight loss, fevers, chills, abdominal pain, cough, chest pain, nausea, vomiting, numbness, tingling, headaches, vision changes, sick contacts, confusion, memory difficulties, change in behavior/personality, and sleep disturbances.    Home medications:  Finasteride 5mg daily  Amlodipine 5mg daily  Simvastatin 10mg nightly  Tizanidine 2mg at bedtime PRN    PAST MEDICAL & SURGICAL HISTORY:  HLD (hyperlipidemia)  BPH (benign prostatic hyperplasia)  No significant past surgical history    Allergies  No Known Allergies    SHx - No smoking, No ETOH, No drug abuse    Review of Systems:  CONSTITUTIONAL:   HEENT:  No visual loss, blurred vision, double vision.  No hearing loss, sneezing, congestion, runny nose or sore throat.  SKIN:  No rash or itching.  CARDIOVASCULAR:  No chest pain, chest pressure or chest discomfort. No palpitations or edema.  RESPIRATORY:  No shortness of breath, cough or sputum.  GASTROINTESTINAL:  No anorexia, nausea, vomiting or diarrhea. No abdominal pain.  GENITOURINARY:  No dysuria. No increased frequency. No retention. No incontinence.  NEUROLOGICAL:  See HPI  MUSCULOSKELETAL:  No muscle, back pain, joint pain or stiffness. (21 Jul 2020 09:54)    REVIEW OF SYSTEMS:  As states in HPI.    Medications:  chlorhexidine 4% Liquid 1 Application(s) Topical <User Schedule>  cyanocobalamin Injectable 1000 MICROGram(s) IntraMuscular daily  enoxaparin Injectable 40 milliGRAM(s) SubCutaneous daily  finasteride 5 milliGRAM(s) Oral daily  lidocaine 1% Injectable 20 milliLiter(s) Local Injection once  LORazepam   Injectable 1 milliGRAM(s) IV Push once PRN  LORazepam   Injectable 2 milliGRAM(s) IV Push once PRN  polyethylene glycol 3350 17 Gram(s) Oral daily PRN  simvastatin 10 milliGRAM(s) Oral at bedtime  sodium chloride 2 Gram(s) Oral every 8 hours  sodium chloride 0.9% lock flush 10 milliLiter(s) IV Push every 1 hour PRN  sodium chloride 0.9%. 1000 milliLiter(s) IV Continuous <Continuous>  valproate sodium IVPB 250 milliGRAM(s) IV Intermittent three times a day    Vitals:  T(C): 37.2 (07-28-20 @ 04:52), Max: 37.2 (07-28-20 @ 04:52)  HR: 61 (07-28-20 @ 04:52) (56 - 83)  BP: 110/69 (07-28-20 @ 04:52) (110/66 - 138/71)  RR: 18 (07-28-20 @ 04:52) (16 - 18)  SpO2: 97% (07-28-20 @ 04:52) (97% - 99%)    Labs:                        13.4   7.80  )-----------( 191      ( 27 Jul 2020 08:58 )             39.9     07-27    137  |  100  |  13  ----------------------------<  106<H>  4.2   |  26  |  0.88    Ca    8.8      27 Jul 2020 08:58      CAPILLARY BLOOD GLUCOSE    POCT Blood Glucose.: 122 mg/dL (26 Jul 2020 17:29)      CSF:  Total Nucleated Cell Count, CSF: 1 /uL (07-23-20 @ 08:24)  RBC Count - Spinal Fluid: 0 /uL (07-23-20 @ 08:24)  Protein, CSF: 18 mg/dL (07-23-20 @ 13:53)  Protein, CSF: 18 mg/dL (07-23-20 @ 08:25)        PHYSICAL EXAMINATION:  General: Well-developed, well nourished, in no acute distress.  Neurologic:  - Mental Status:  Alert, awake, oriented to person, place, and time; Speech is fluent with intact comprehension.  - Cranial Nerves II-XII: Visual fields are full to confronation; Pupils are equal, round, and reactive to light; Visual acuity is 20/20 bilaterally; Fundoscopic exam is normal with sharp discs. Extraocular movements are intact without nystagmus. Facial sensation is intact in the V1-V3 distribution bilaterally. Face is symmetric with normal eye closure and smile. Hearing is intact to finger rub. Head turning and shoulder shrug are intact. Tongue protudes in the midline.  - Motor:  Strength is 5/5 throughout.  There is no prontator drift.  Normal muscle bulk and tone throughout.  - Reflexes:  2+ and symmetric at the biceps, triceps, brachioradialis, knees, and ankles.  Plantar responses flexor.  - Sensory:  Intact throughout to LT.  - Coordination:  Finger-nose-finger without dysmetria.  - Gait: deferred    Radiology:  MR Head w/wo IV Cont:  (22 Jul 2020 18:41)  IMPRESSION:    Redemonstration of T2 and FLAIR hyperintense signal within the mesial right temporal lobe in the region of the hippocampal and parahippocampal gyri, with associated subtle swelling. Differential diagnosis includes autoimmune encephalitis, herpes encephalitis, low-grade glioblastoma, and sequelae of recentseizure.    No abnormal parenchymal or leptomeningeal enhancement.    No acute intracranial hemorrhage or acute infarction. ***INCOMPLETE***    Neurology  Progress Note  07-28-20    Name:  DENA FARNSWORTH; 75y    Interval History: Patient reports increased urinary frequency and bowel incontinence overnight. This morning patient reports to be feeling better and denies acute complaints.    HPI:  74 y/o RH male with PMHx BPH, HTN, and HLD presents to ED with intermittent left upper extremity spasms and weakness since the first week of June. Patient reports that symptoms have progressed to include his right upper and lower extremity since late June. He states that in the beginning of June, he had pain below his left ear. A couple weeks later, patient started to have stiffness of his left upper extremity that felt like spasms and was accompanied by shaking. He then began to have involvement of his right side with involvement of his right upper and right lower extremity. The shaking is usually followed by weakness, which lasts a few minutes before he regains full function. The weakness in his arms has led him to break dishes multiple times. Patient states that holding a weighted object may precipitate the episodes. He does not know how often the episodes occur, but says they are very frequent when he does his daily activities. He cannot recall exactly when the last one was. The patient has had multiple falls since June, most recently a couple weeks ago that he attributes to loss of balance. He denies syncope, loss of consciousness, tongue biting, and urinary incontinence. Patient denies previous history of a neurological disorder, including seizures. Patient does not ambulate with mechanical assistance at baseline. He lives alone and says that the episodes have not been witnessed.  Patient had an MRI brain without contrast yesterday after going to a private neurologist. He states that his friend Belinda ([phone]) will bring in CD with images to be reviewed.    Patient denies weight loss, fevers, chills, abdominal pain, cough, chest pain, nausea, vomiting, numbness, tingling, headaches, vision changes, sick contacts, confusion, memory difficulties, change in behavior/personality, and sleep disturbances.    Home medications:  Finasteride 5mg daily  Amlodipine 5mg daily  Simvastatin 10mg nightly  Tizanidine 2mg at bedtime PRN    PAST MEDICAL & SURGICAL HISTORY:  HLD (hyperlipidemia)  BPH (benign prostatic hyperplasia)  No significant past surgical history    Allergies  No Known Allergies    SHx - No smoking, No ETOH, No drug abuse    REVIEW OF SYSTEMS:  As states in HPI.    Medications:  chlorhexidine 4% Liquid 1 Application(s) Topical <User Schedule>  cyanocobalamin Injectable 1000 MICROGram(s) IntraMuscular daily  enoxaparin Injectable 40 milliGRAM(s) SubCutaneous daily  finasteride 5 milliGRAM(s) Oral daily  lidocaine 1% Injectable 20 milliLiter(s) Local Injection once  LORazepam   Injectable 1 milliGRAM(s) IV Push once PRN  LORazepam   Injectable 2 milliGRAM(s) IV Push once PRN  polyethylene glycol 3350 17 Gram(s) Oral daily PRN  simvastatin 10 milliGRAM(s) Oral at bedtime  sodium chloride 2 Gram(s) Oral every 8 hours  sodium chloride 0.9% lock flush 10 milliLiter(s) IV Push every 1 hour PRN  sodium chloride 0.9%. 1000 milliLiter(s) IV Continuous <Continuous>  valproate sodium IVPB 250 milliGRAM(s) IV Intermittent three times a day    Vitals:  T(C): 37.2 (07-28-20 @ 04:52), Max: 37.2 (07-28-20 @ 04:52)  HR: 61 (07-28-20 @ 04:52) (56 - 83)  BP: 110/69 (07-28-20 @ 04:52) (110/66 - 138/71)  RR: 18 (07-28-20 @ 04:52) (16 - 18)  SpO2: 97% (07-28-20 @ 04:52) (97% - 99%)    Labs:                        13.4   7.80  )-----------( 191      ( 27 Jul 2020 08:58 )             39.9     07-27    137  |  100  |  13  ----------------------------<  106<H>  4.2   |  26  |  0.88    Ca    8.8      27 Jul 2020 08:58      CAPILLARY BLOOD GLUCOSE    POCT Blood Glucose.: 122 mg/dL (26 Jul 2020 17:29)      CSF:  Total Nucleated Cell Count, CSF: 1 /uL (07-23-20 @ 08:24)  RBC Count - Spinal Fluid: 0 /uL (07-23-20 @ 08:24)  Protein, CSF: 18 mg/dL (07-23-20 @ 13:53)  Protein, CSF: 18 mg/dL (07-23-20 @ 08:25)        PHYSICAL EXAMINATION:  General: Well-developed, well nourished, in no acute distress.  Neurologic:  - Mental Status:  Alert, awake, oriented to person, place, and time; Speech is fluent with intact comprehension.  - Cranial Nerves II-XII: Visual fields are full to confronation; Pupils are equal, round, and reactive to light; Extraocular movements are intact without nystagmus. Facial sensation is intact in the V1-V3 distribution bilaterally. Face is symmetric with normal eye closure and smile. Hearing is intact to finger rub. Head turning and shoulder shrug are intact.  - Motor:  Strength is 5/5 throughout.  There is no prontator drift.  Normal muscle bulk and tone throughout.  - Sensory:  Intact throughout to LT.  - Gait: deferred    Radiology:  MR Head w/wo IV Cont:  (22 Jul 2020 18:41)  IMPRESSION:    Redemonstration of T2 and FLAIR hyperintense signal within the mesial right temporal lobe in the region of the hippocampal and parahippocampal gyri, with associated subtle swelling. Differential diagnosis includes autoimmune encephalitis, herpes encephalitis, low-grade glioblastoma, and sequelae of recentseizure.    No abnormal parenchymal or leptomeningeal enhancement.    No acute intracranial hemorrhage or acute infarction. Neurology  Progress Note  07-28-20    Name:  DENA FARNSWORTH; 75y    Interval History: Patient reports increased urinary frequency and bowel incontinence overnight. This morning patient reports to be feeling better and denies acute complaints.    HPI:  76 y/o RH male with PMHx BPH, HTN, and HLD presents to ED with intermittent left upper extremity spasms and weakness since the first week of June. Patient reports that symptoms have progressed to include his right upper and lower extremity since late June. He states that in the beginning of June, he had pain below his left ear. A couple weeks later, patient started to have stiffness of his left upper extremity that felt like spasms and was accompanied by shaking. He then began to have involvement of his right side with involvement of his right upper and right lower extremity. The shaking is usually followed by weakness, which lasts a few minutes before he regains full function. The weakness in his arms has led him to break dishes multiple times. Patient states that holding a weighted object may precipitate the episodes. He does not know how often the episodes occur, but says they are very frequent when he does his daily activities. He cannot recall exactly when the last one was. The patient has had multiple falls since June, most recently a couple weeks ago that he attributes to loss of balance. He denies syncope, loss of consciousness, tongue biting, and urinary incontinence. Patient denies previous history of a neurological disorder, including seizures. Patient does not ambulate with mechanical assistance at baseline. He lives alone and says that the episodes have not been witnessed.  Patient had an MRI brain without contrast yesterday after going to a private neurologist. He states that his friend Belinda ([phone]) will bring in CD with images to be reviewed.    Patient denies weight loss, fevers, chills, abdominal pain, cough, chest pain, nausea, vomiting, numbness, tingling, headaches, vision changes, sick contacts, confusion, memory difficulties, change in behavior/personality, and sleep disturbances.    Home medications:  Finasteride 5mg daily  Amlodipine 5mg daily  Simvastatin 10mg nightly  Tizanidine 2mg at bedtime PRN    PAST MEDICAL & SURGICAL HISTORY:  HLD (hyperlipidemia)  BPH (benign prostatic hyperplasia)  No significant past surgical history    Allergies  No Known Allergies    SHx - No smoking, No ETOH, No drug abuse    REVIEW OF SYSTEMS:  As states in HPI.    Medications:  chlorhexidine 4% Liquid 1 Application(s) Topical <User Schedule>  cyanocobalamin Injectable 1000 MICROGram(s) IntraMuscular daily  enoxaparin Injectable 40 milliGRAM(s) SubCutaneous daily  finasteride 5 milliGRAM(s) Oral daily  lidocaine 1% Injectable 20 milliLiter(s) Local Injection once  LORazepam   Injectable 1 milliGRAM(s) IV Push once PRN  LORazepam   Injectable 2 milliGRAM(s) IV Push once PRN  polyethylene glycol 3350 17 Gram(s) Oral daily PRN  simvastatin 10 milliGRAM(s) Oral at bedtime  sodium chloride 2 Gram(s) Oral every 8 hours  sodium chloride 0.9% lock flush 10 milliLiter(s) IV Push every 1 hour PRN  sodium chloride 0.9%. 1000 milliLiter(s) IV Continuous <Continuous>  valproate sodium IVPB 250 milliGRAM(s) IV Intermittent three times a day    Vitals:  T(C): 37.2 (07-28-20 @ 04:52), Max: 37.2 (07-28-20 @ 04:52)  HR: 61 (07-28-20 @ 04:52) (56 - 83)  BP: 110/69 (07-28-20 @ 04:52) (110/66 - 138/71)  RR: 18 (07-28-20 @ 04:52) (16 - 18)  SpO2: 97% (07-28-20 @ 04:52) (97% - 99%)    Labs:                        13.4   7.80  )-----------( 191      ( 27 Jul 2020 08:58 )             39.9     07-27    137  |  100  |  13  ----------------------------<  106<H>  4.2   |  26  |  0.88    Ca    8.8      27 Jul 2020 08:58      CAPILLARY BLOOD GLUCOSE    POCT Blood Glucose.: 122 mg/dL (26 Jul 2020 17:29)      CSF:  Total Nucleated Cell Count, CSF: 1 /uL (07-23-20 @ 08:24)  RBC Count - Spinal Fluid: 0 /uL (07-23-20 @ 08:24)  Protein, CSF: 18 mg/dL (07-23-20 @ 13:53)  Protein, CSF: 18 mg/dL (07-23-20 @ 08:25)        PHYSICAL EXAMINATION:  General: Well-developed, well nourished, in no acute distress.  Neurologic:  - Mental Status:  Alert, awake, oriented to person, place, and time; Speech is fluent with intact comprehension.  - Cranial Nerves II-XII: Visual fields are full to confronation; Pupils are equal, round, and reactive to light; Extraocular movements are intact without nystagmus. Facial sensation is intact in the V1-V3 distribution bilaterally. Face is symmetric with normal eye closure and smile. Hearing is intact to finger rub. Head turning and shoulder shrug are intact.  - Motor:  Strength is 5/5 throughout.  There is no prontator drift.  Normal muscle bulk and tone throughout.  - Sensory:  Intact throughout to LT.  - Gait: deferred    Radiology:  MR Head w/wo IV Cont:  (22 Jul 2020 18:41)  IMPRESSION:    Redemonstration of T2 and FLAIR hyperintense signal within the mesial right temporal lobe in the region of the hippocampal and parahippocampal gyri, with associated subtle swelling. Differential diagnosis includes autoimmune encephalitis, herpes encephalitis, low-grade glioblastoma, and sequelae of recentseizure.    No abnormal parenchymal or leptomeningeal enhancement.    No acute intracranial hemorrhage or acute infarction.

## 2020-07-28 NOTE — PROGRESS NOTE ADULT - ASSESSMENT
74 yo male PMHx BPH, HTN, and HLD presents to ED with intermittent left upper extremity tonic clonic activity with progression to right upper and lower extremities and multiple falls since June, concerning for seizures. Outpatient MRI brain without contrast demonstrates hyperenhancement of right limbic lobe.     # Bilateral upper extremity tonic clonic activity   # autoimmune encephalitis  # HTN  # HLD  # BPH  # lung nodules and tree in bud opacities    Appreciate neurology care  Follow up serum autoimmune workup  cont proscar  cont statin  holding amlodipine  cont PLEX per neuro  cont depacon    PCP Dr. Tevin Chambers will be covering me tomorrow. Please call [facility] with questions [phone].

## 2020-07-28 NOTE — PROGRESS NOTE ADULT - SUBJECTIVE AND OBJECTIVE BOX
Subjective: Patient seen and examined. No new events except as noted.     REVIEW OF SYSTEMS:    CONSTITUTIONAL:+ weakness, fevers or chills  EYES/ENT: No visual changes;  No vertigo or throat pain   NECK: No pain or stiffness  RESPIRATORY: No cough, wheezing, hemoptysis; No shortness of breath  CARDIOVASCULAR: No chest pain or palpitations  GASTROINTESTINAL: No abdominal or epigastric pain. No nausea, vomiting, or hematemesis; No diarrhea or constipation. No melena or hematochezia.  GENITOURINARY: No dysuria, frequency or hematuria  NEUROLOGICAL: No numbness or weakness  SKIN: No itching, burning, rashes, or lesions   All other review of systems is negative unless indicated above.    MEDICATIONS:  MEDICATIONS  (STANDING):  chlorhexidine 4% Liquid 1 Application(s) Topical <User Schedule>  cyanocobalamin Injectable 1000 MICROGram(s) IntraMuscular daily  enoxaparin Injectable 40 milliGRAM(s) SubCutaneous daily  finasteride 5 milliGRAM(s) Oral daily  lidocaine 1% Injectable 20 milliLiter(s) Local Injection once  simvastatin 10 milliGRAM(s) Oral at bedtime  sodium chloride 0.9%. 1000 milliLiter(s) (95 mL/Hr) IV Continuous <Continuous>  valproate sodium IVPB 250 milliGRAM(s) IV Intermittent three times a day      PHYSICAL EXAM:  T(C): 36.6 (07-28-20 @ 07:45), Max: 37.2 (07-28-20 @ 04:52)  HR: 60 (07-28-20 @ 07:45) (60 - 83)  BP: 126/79 (07-28-20 @ 07:45) (110/66 - 138/71)  RR: 18 (07-28-20 @ 07:45) (16 - 18)  SpO2: 96% (07-28-20 @ 07:45) (96% - 99%)  Wt(kg): --  I&O's Summary    27 Jul 2020 07:01  -  28 Jul 2020 07:00  --------------------------------------------------------  IN: 890 mL / OUT: 0 mL / NET: 890 mL          Appearance: Normal	  HEENT:   Normal oral mucosa, PERRL, EOMI	  Lymphatic: No lymphadenopathy , no edema  Cardiovascular: Normal S1 S2, No JVD, No murmurs , Peripheral pulses palpable 2+ bilaterally  Respiratory: Lungs clear to auscultation, normal effort 	  Gastrointestinal:  Soft, Non-tender, + BS	  Skin: No rashes, No ecchymoses, No cyanosis, warm to touch  Musculoskeletal: Normal range of motion, normal strength  Psychiatry:  Mood & affect appropriate  Ext: No edema      LABS:    CARDIAC MARKERS:                                13.4   7.80  )-----------( 191      ( 27 Jul 2020 08:58 )             39.9     07-27    137  |  100  |  13  ----------------------------<  106<H>  4.2   |  26  |  0.88    Ca    8.8      27 Jul 2020 08:58      proBNP:   Lipid Profile:   HgA1c:   TSH:     Negative          TELEMETRY: 	    ECG:  	  RADIOLOGY:   DIAGNOSTIC TESTING:  [ ] Echocardiogram:  [ ]  Catheterization:  [ ] Stress Test:    OTHER:

## 2020-07-28 NOTE — PROGRESS NOTE ADULT - SUBJECTIVE AND OBJECTIVE BOX
Patient is a 75y old  Male who presents with a chief complaint of limbic encephalitis (2020 11:00)      SUBJECTIVE / OVERNIGHT EVENTS:    Patient seen and examined. denies cp sob nvd. no acute events.      Vital Signs Last 24 Hrs  T(C): 36.6 (2020 07:45), Max: 37.2 (2020 04:52)  T(F): 97.8 (2020 07:45), Max: 99 (2020 04:52)  HR: 60 (2020 07:45) (60 - 83)  BP: 126/79 (2020 07:45) (110/66 - 138/71)  BP(mean): --  RR: 18 (2020 07:45) (16 - 18)  SpO2: 96% (2020 07:45) (96% - 99%)  I&O's Summary    2020 07:01  -  2020 07:00  --------------------------------------------------------  IN: 890 mL / OUT: 0 mL / NET: 890 mL        PE:  GENERAL: NAD, AAOx3  HEAD:  Atraumatic, Normocephalic  CHEST/LUNG: CTABL, No wheeze  HEART: Regular rate and rhythm; no murmur  ABDOMEN: Soft, Nontender, Nondistended; Bowel sounds present  EXTREMITIES:  2+ Peripheral Pulses, No clubbing, cyanosis, or edema  SKIN: No rashes or lesions, right CVC    LABS:                        13.4   7.80  )-----------( 191      ( 2020 08:58 )             39.9     07-    137  |  100  |  13  ----------------------------<  106<H>  4.2   |  26  |  0.88    Ca    8.8      2020 08:58        CAPILLARY BLOOD GLUCOSE            Urinalysis Basic - ( 2020 08:29 )    Color: Light Yellow / Appearance: Clear / S.014 / pH: x  Gluc: x / Ketone: Negative  / Bili: Negative / Urobili: <2 mg/dL   Blood: x / Protein: Negative / Nitrite: Negative   Leuk Esterase: Negative / RBC: x / WBC x   Sq Epi: x / Non Sq Epi: x / Bacteria: x        RADIOLOGY & ADDITIONAL TESTS:    Imaging Personally Reviewed:  [x] YES  [ ] NO    Consultant(s) Notes Reviewed:  [x] YES  [ ] NO    MEDICATIONS  (STANDING):  chlorhexidine 4% Liquid 1 Application(s) Topical <User Schedule>  cyanocobalamin Injectable 1000 MICROGram(s) IntraMuscular daily  enoxaparin Injectable 40 milliGRAM(s) SubCutaneous daily  finasteride 5 milliGRAM(s) Oral daily  lidocaine 1% Injectable 20 milliLiter(s) Local Injection once  simvastatin 10 milliGRAM(s) Oral at bedtime  sodium chloride 0.9%. 1000 milliLiter(s) (95 mL/Hr) IV Continuous <Continuous>  valproate sodium IVPB 250 milliGRAM(s) IV Intermittent three times a day    MEDICATIONS  (PRN):  LORazepam   Injectable 1 milliGRAM(s) IV Push once PRN for gtc seizure > 2 minutes  polyethylene glycol 3350 17 Gram(s) Oral daily PRN Constipation  sodium chloride 0.9% lock flush 10 milliLiter(s) IV Push every 1 hour PRN Pre/post blood products, medications, blood draw, and to maintain line patency      Care Discussed with Consultants/Other Providers [x] YES  [ ] NO    HEALTH ISSUES - PROBLEM Dx:  Limbic encephalitis: Limbic encephalitis  Bradycardia: Bradycardia

## 2020-07-29 LAB
ANION GAP SERPL CALC-SCNC: 13 MMOL/L — SIGNIFICANT CHANGE UP (ref 5–17)
BUN SERPL-MCNC: 14 MG/DL — SIGNIFICANT CHANGE UP (ref 7–23)
CALCIUM SERPL-MCNC: 9.1 MG/DL — SIGNIFICANT CHANGE UP (ref 8.4–10.5)
CHLORIDE SERPL-SCNC: 100 MMOL/L — SIGNIFICANT CHANGE UP (ref 96–108)
CO2 SERPL-SCNC: 24 MMOL/L — SIGNIFICANT CHANGE UP (ref 22–31)
CREAT SERPL-MCNC: 0.9 MG/DL — SIGNIFICANT CHANGE UP (ref 0.5–1.3)
FSP PPP-MCNC: < 5 — SIGNIFICANT CHANGE UP
GLUCOSE SERPL-MCNC: 98 MG/DL — SIGNIFICANT CHANGE UP (ref 70–99)
HCT VFR BLD CALC: 39.4 % — SIGNIFICANT CHANGE UP (ref 39–50)
HGB BLD-MCNC: 13.1 G/DL — SIGNIFICANT CHANGE UP (ref 13–17)
MBP CSF-MCNC: <2 MCG/L — SIGNIFICANT CHANGE UP (ref 2–4)
MCHC RBC-ENTMCNC: 29.5 PG — SIGNIFICANT CHANGE UP (ref 27–34)
MCHC RBC-ENTMCNC: 33.2 GM/DL — SIGNIFICANT CHANGE UP (ref 32–36)
MCV RBC AUTO: 88.7 FL — SIGNIFICANT CHANGE UP (ref 80–100)
NRBC # BLD: 0 /100 WBCS — SIGNIFICANT CHANGE UP (ref 0–0)
PLATELET # BLD AUTO: 171 K/UL — SIGNIFICANT CHANGE UP (ref 150–400)
POTASSIUM SERPL-MCNC: 4.4 MMOL/L — SIGNIFICANT CHANGE UP (ref 3.5–5.3)
POTASSIUM SERPL-SCNC: 4.4 MMOL/L — SIGNIFICANT CHANGE UP (ref 3.5–5.3)
RBC # BLD: 4.44 M/UL — SIGNIFICANT CHANGE UP (ref 4.2–5.8)
RBC # FLD: 13.8 % — SIGNIFICANT CHANGE UP (ref 10.3–14.5)
SARS-COV-2 RNA SPEC QL NAA+PROBE: SIGNIFICANT CHANGE UP
SODIUM SERPL-SCNC: 137 MMOL/L — SIGNIFICANT CHANGE UP (ref 135–145)
WBC # BLD: 8.58 K/UL — SIGNIFICANT CHANGE UP (ref 3.8–10.5)
WBC # FLD AUTO: 8.58 K/UL — SIGNIFICANT CHANGE UP (ref 3.8–10.5)

## 2020-07-29 PROCEDURE — 36514 APHERESIS PLASMA: CPT

## 2020-07-29 PROCEDURE — 99233 SBSQ HOSP IP/OBS HIGH 50: CPT

## 2020-07-29 PROCEDURE — 95718 EEG PHYS/QHP 2-12 HR W/VEEG: CPT

## 2020-07-29 RX ADMIN — Medication 250 MILLIGRAM(S): at 21:09

## 2020-07-29 RX ADMIN — FINASTERIDE 5 MILLIGRAM(S): 5 TABLET, FILM COATED ORAL at 13:03

## 2020-07-29 RX ADMIN — PREGABALIN 1000 MICROGRAM(S): 225 CAPSULE ORAL at 13:04

## 2020-07-29 RX ADMIN — ENOXAPARIN SODIUM 40 MILLIGRAM(S): 100 INJECTION SUBCUTANEOUS at 13:03

## 2020-07-29 RX ADMIN — SIMVASTATIN 10 MILLIGRAM(S): 20 TABLET, FILM COATED ORAL at 21:09

## 2020-07-29 RX ADMIN — CHLORHEXIDINE GLUCONATE 1 APPLICATION(S): 213 SOLUTION TOPICAL at 04:31

## 2020-07-29 RX ADMIN — Medication 250 MILLIGRAM(S): at 13:03

## 2020-07-29 RX ADMIN — Medication 250 MILLIGRAM(S): at 05:26

## 2020-07-29 NOTE — PROGRESS NOTE ADULT - SUBJECTIVE AND OBJECTIVE BOX
Patient is a 75y old  Male who presents with a chief complaint of limbic encephalitis (2020 12:34)      INTERVAL HPI/OVERNIGHT EVENTS: noted  pt seen and examined  feels well, no complaints      Vital Signs Last 24 Hrs  T(C): 36.7 (2020 21:00), Max: 36.9 (2020 23:34)  T(F): 98 (2020 21:00), Max: 98.4 (2020 23:34)  HR: 73 (2020 21:00) (59 - 89)  BP: 126/66 (2020 21:00) (111/64 - 155/83)  BP(mean): --  RR: 18 (2020 21:00) (18 - 18)  SpO2: 95% (2020 21:00) (95% - 99%)    chlorhexidine 4% Liquid 1 Application(s) Topical <User Schedule>  enoxaparin Injectable 40 milliGRAM(s) SubCutaneous daily  finasteride 5 milliGRAM(s) Oral daily  lidocaine 1% Injectable 20 milliLiter(s) Local Injection once  LORazepam   Injectable 1 milliGRAM(s) IV Push once PRN  polyethylene glycol 3350 17 Gram(s) Oral daily PRN  simvastatin 10 milliGRAM(s) Oral at bedtime  sodium chloride 0.9% lock flush 10 milliLiter(s) IV Push every 1 hour PRN  valproic acid 250 milliGRAM(s) Oral three times a day      PHYSICAL EXAM:  GENERAL: NAD,   EYES: conjunctiva and sclera clear  ENMT: Moist mucous membranes  NECK: Supple, No JVD, Normal thyroid  CHEST/LUNG: non labored, cta b/l  HEART: Regular rate and rhythm; No murmurs, rubs, or gallops  ABDOMEN: Soft, Nontender, Nondistended; Bowel sounds present  EXTREMITIES:  2+ Peripheral Pulses, No clubbing, cyanosis, or edema  LYMPH: No lymphadenopathy noted  SKIN: No rashes or lesions    Consultant(s) Notes Reviewed:  [x ] YES  [ ] NO  Care Discussed with Consultants/Other Providers [ x] YES  [ ] NO    LABS:                        13.1   8.58  )-----------( 171      ( 2020 05:58 )             39.4     07-29    137  |  100  |  14  ----------------------------<  98  4.4   |  24  |  0.90    Ca    9.1      2020 05:58        Urinalysis Basic - ( 2020 08:29 )    Color: Light Yellow / Appearance: Clear / S.014 / pH: x  Gluc: x / Ketone: Negative  / Bili: Negative / Urobili: <2 mg/dL   Blood: x / Protein: Negative / Nitrite: Negative   Leuk Esterase: Negative / RBC: x / WBC x   Sq Epi: x / Non Sq Epi: x / Bacteria: x      CAPILLARY BLOOD GLUCOSE            Urinalysis Basic - ( 2020 08:29 )    Color: Light Yellow / Appearance: Clear / S.014 / pH: x  Gluc: x / Ketone: Negative  / Bili: Negative / Urobili: <2 mg/dL   Blood: x / Protein: Negative / Nitrite: Negative   Leuk Esterase: Negative / RBC: x / WBC x   Sq Epi: x / Non Sq Epi: x / Bacteria: x          RADIOLOGY & ADDITIONAL TESTS:    Imaging Personally Reviewed:  [x ] YES  [ ] NO

## 2020-07-29 NOTE — PROGRESS NOTE ADULT - SUBJECTIVE AND OBJECTIVE BOX
Neurology Progress Note      the patient's symptoms continue    MEDICATIONS  (STANDING):  chlorhexidine 4% Liquid 1 Application(s) Topical <User Schedule>  cyanocobalamin Injectable 1000 MICROGram(s) IntraMuscular daily  enoxaparin Injectable 40 milliGRAM(s) SubCutaneous daily  finasteride 5 milliGRAM(s) Oral daily  lidocaine 1% Injectable 20 milliLiter(s) Local Injection once  simvastatin 10 milliGRAM(s) Oral at bedtime  sodium chloride 0.9%. 1000 milliLiter(s) (95 mL/Hr) IV Continuous <Continuous>  valproic acid 250 milliGRAM(s) Oral three times a day    MEDICATIONS  (PRN):  LORazepam   Injectable 1 milliGRAM(s) IV Push once PRN for gtc seizure > 2 minutes  polyethylene glycol 3350 17 Gram(s) Oral daily PRN Constipation  sodium chloride 0.9% lock flush 10 milliLiter(s) IV Push every 1 hour PRN Pre/post blood products, medications, blood draw, and to maintain line patency              Vital Signs Last 24 Hrs  T(C): 36.9 (29 Jul 2020 07:44), Max: 36.9 (28 Jul 2020 15:31)  T(F): 98.4 (29 Jul 2020 07:44), Max: 98.4 (28 Jul 2020 15:31)  HR: 59 (29 Jul 2020 07:44) (59 - 82)  BP: 135/84 (29 Jul 2020 07:44) (130/74 - 155/83)  BP(mean): --  RR: 18 (29 Jul 2020 07:44) (18 - 18)  SpO2: 96% (29 Jul 2020 07:44) (95% - 99%)    Physical exam  MENTAL STATUS- Alert, attends, fluent, non-dysarthric, follows commands, oriented, good memory and affect.  CRANIAL NERVES-II Optic - Bilateral - Normal Visual Fields. III-IV-VI EOMI & Pupils - Bilateral - Normal Bilaterally. V Trigeminal - Bilateral - Normal bilateral facial sensation and jaw movement. VII Facial - Normal bilateral facial movement. VIII Acoustic - Bilateral - Hearing normal to voice bilaterally. IX Glossopharyngeal / X Vagus - Normal palate elevates symmetrically. XI Accessory - Normal Bilaterally. XII Hypoglossal - Tongue protrudes midline and moves symmetrically.  MOTOR-Bulk and Contour - Normal. Tone - Normal tone, no abnormal movements. Strength - 5/5 normal muscle strength - Strength full throughout.  SENSORY-Normal - LT, DSS, Vibration.  REFLEXES- DTR's 2+ throughout.  COORDINATION-Normal FFM, SAMANTHA, FNF - bilaterally.  GAIT-Normal base, heel, toe, tandem.    CBC Full  -  ( 29 Jul 2020 05:58 )  WBC Count : 8.58 K/uL  RBC Count : 4.44 M/uL  Hemoglobin : 13.1 g/dL  Hematocrit : 39.4 %  Platelet Count - Automated : 171 K/uL  Mean Cell Volume : 88.7 fl  Mean Cell Hemoglobin : 29.5 pg  Mean Cell Hemoglobin Concentration : 33.2 gm/dL  Auto Neutrophil # : x  Auto Lymphocyte # : x  Auto Monocyte # : x  Auto Eosinophil # : x  Auto Basophil # : x  Auto Neutrophil % : x  Auto Lymphocyte % : x  Auto Monocyte % : x  Auto Eosinophil % : x  Auto Basophil % : x      07-29    137  |  100  |  14  ----------------------------<  98  4.4   |  24  |  0.90    Ca    9.1      29 Jul 2020 05:58

## 2020-07-29 NOTE — EEG REPORT - NS EEG TEXT BOX
[facility]  Epilepsy Monitoring Unit Report    [facility]: [address], [place] [address], Ph#: [phone]  [facility]: [address] [address] [place] [address], Ph#: [phone]  Office: [address] [facility], [facility] [address], [place] [address] Ph#: [phone]    Patient Name: Shawn Alan    Age: 75 year, : 1945  Patient ID: -, MRN #: MR# [id number], Lucio: [id number]  Referring Physician: Dr Gloria    Study Started: 12:40 on 2020   Study Ended: xx:xx on xx/xx/xxxx    Study Information:    EEG Recording Technique:  The patient underwent continuous Video-EEG monitoring, using Telemetry System hardware on the XLTek Digital System. EEG and video data were stored on a computer hard drive with important events saved in digital archive files. The material was reviewed by a physician (electroencephalographer / epileptologist) on a daily basis. Vern and seizure detection algorithms were utilized and reviewed. An EEG Technician attended to the patient, and was available throughout daytime work hours.  The epilepsy center neurologist was available in person or on call 24-hours per day.    EEG Placement and Labeling of Electrodes:  The EEG was performed utilizing 20 channel referential EEG connections (coronal over temporal over parasagittal montage) using all standard 10-20 electrode placements with EKG, with additional electrodes placed in the inferior temporal region using the modified 10-10 montage electrode placements for elective admissions, or if deemed necessary. Recording was at a sampling rate of 256 samples per second per channel. Time synchronized digital video recording was done simultaneously with EEG recording. A low light infrared camera was used for low light recording.     History:  EMU study performed at bedside  COR: awake, alert  NO h/v or photic performed  75 y/ M h/o bph, hld, htn  p/w  imtermittent left upper extremity spasms, weakness      Home Antiepileptic Medication and Device	  	    Interpretation:    Starting: Day 1      2020      Time: 12:40     Duration: 19:12    Daily EEG Visual Analysis  FINDINGS:  The background was continuous, spontaneously variable and reactive. During wakefulness, the posterior dominant rhythm consisted of symmetric, well-modulated 9-10 Hz activity, with amplitude to 30 uV, that attenuated to eye opening.  Low amplitude frontal beta was noted in wakefulness.    Background Slowing:  No generalized background slowing was present.    Focal Slowing:   -Intermittent polymorphic delta slowing in the right frontal region.    Sleep Background:  Drowsiness was characterized by fragmentation, attenuation, and slowing of the background activity.    Sleep was characterized by the presence of vertex waves, symmetric sleep spindles and K-complexes.    Other Non-Epileptiform Findings:  None were present.    Interictal Epileptiform Activity:   None were present.    Events:  No events or seizures recorded.    Artifacts:  Intermittent myogenic and movement artifacts were noted.    ECG:  The heart rate on single channel ECG was predominantly between 60-80 BPM.    AEDs:  VPA 250mg TID    EEG Summary:  Abnormal EEG in the awake, drowsy and asleep states.  -Intermittent polymorphic delta slowing in the right frontal region.      Impression/Clinical Correlate:  1. Functional abnormality in the right frontal region.  2. No epileptiform abnormalities were recorded.    Antonio Salazar MD  EEG / Epilepsy Attending Physician

## 2020-07-29 NOTE — PROGRESS NOTE ADULT - ASSESSMENT
76 y/o M with PMHx BPH, HTN, and HLD presented to ED with intermittent left upper extremity spasms and weakness since the first week of June. His symptoms progressed to his right upper and lower extremity in late June with weakness, spasms and shaking. The weakness led to difficulty holding objects. He also had multiple falls. Outpatient MRI brain without contrast demonstrated hyperenhancement of right limbic lobe. 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  Autoimmune/paraneoplastic panels pending.     s/p PLEX #3 on 7/27, tolerated well.    PLEX #4 of 5 today, 1 plasma volume with 5% albumin as replacement solution.    PLEX#5 currently scheduled for 7/31.

## 2020-07-29 NOTE — EEG REPORT - NS EEG TEXT BOX
Starting: Day 2      7/29/2020      Time: 08:00 am                Duration: 2 H 55 M     Daily EEG Visual Analysis  FINDINGS:  The background was continuous, spontaneously variable and reactive. During wakefulness, the posterior dominant rhythm consisted of symmetric, well-modulated 8.5 Hz activity, with amplitude to 30 uV, that attenuated to eye opening.  Low amplitude frontal beta was noted in wakefulness.    Background Slowing:  No generalized background slowing was present.    Focal Slowing:   -Intermittent polymorphic delta slowing in the right frontal region.    Sleep Background:  Drowsiness was characterized by fragmentation, attenuation, and slowing of the background activity.    Sleep was characterized by the presence of vertex waves, symmetric sleep spindles and K-complexes.    Other Non-Epileptiform Findings:  None were present.    Interictal Epileptiform Activity:   None were present.    Events:  No events or seizures recorded.    Artifacts:  Intermittent myogenic and movement artifacts were noted.    ECG:  The heart rate on single channel ECG was predominantly between 60-80 BPM.    AEDs:  VPA 250mg TID      EEG Summary:  Abnormal EEG in the awake, drowsy and asleep states.  -Intermittent polymorphic delta slowing in the right frontal region.      Impression/Clinical Correlate:    1. Functional abnormality in the right frontal region.  2. No epileptiform abnormalities were recorded.      ________________________________________    Sonido Menendez MD  Epilepsy Fellow, [facility] [place]    Antonio Salazar MD	  Attending Physician, [facility] [place]

## 2020-07-29 NOTE — PROGRESS NOTE ADULT - ASSESSMENT
76 yo male PMHx BPH, HTN, and HLD presents to ED with intermittent left upper extremity tonic clonic activity with progression to right upper and lower extremities and multiple falls since June, concerning for seizures. Outpatient MRI brain without contrast demonstrates hyperenhancement of right limbic lobe.     # Bilateral upper extremity tonic clonic activity   # autoimmune encephalitis  # HTN  # HLD  # BPH  # lung nodules and tree in bud opacities    Appreciate neurology care  Follow up serum autoimmune workup  cont proscar  cont statin  holding amlodipine  cont PLEX per neuro  cont depacon    PCP Dr. Tevin Chambers will be covering me tomorrow. Please call [facility] with questions [phone].

## 2020-07-29 NOTE — PROGRESS NOTE ADULT - ASSESSMENT
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

## 2020-07-29 NOTE — PROGRESS NOTE ADULT - ATTENDING COMMENTS
increase VPA to 250-500  Rituxan 1000 Friday after last PLEX  Needs Rituxan in 2 weeks and monthly IVIG.    Autoimmune panel pending  whole body PET as outpatient

## 2020-07-29 NOTE — PROGRESS NOTE ADULT - SUBJECTIVE AND OBJECTIVE BOX
Patient had no events or complaints overnight.       MEDICATIONS  (STANDING):  chlorhexidine 4% Liquid 1 Application(s) Topical <User Schedule>  cyanocobalamin Injectable 1000 MICROGram(s) IntraMuscular daily  enoxaparin Injectable 40 milliGRAM(s) SubCutaneous daily  finasteride 5 milliGRAM(s) Oral daily  lidocaine 1% Injectable 20 milliLiter(s) Local Injection once  simvastatin 10 milliGRAM(s) Oral at bedtime  valproic acid 250 milliGRAM(s) Oral three times a day    MEDICATIONS  (PRN):  LORazepam   Injectable 1 milliGRAM(s) IV Push once PRN for gtc seizure > 2 minutes  polyethylene glycol 3350 17 Gram(s) Oral daily PRN Constipation  sodium chloride 0.9% lock flush 10 milliLiter(s) IV Push every 1 hour PRN Pre/post blood products, medications, blood draw, and to maintain line patency    Vital Signs Last 24 Hrs  T(C): 36.7 (2020 11:55), Max: 36.9 (2020 15:31)  T(F): 98.1 (2020 11:55), Max: 98.4 (2020 15:31)  HR: 71 (2020 11:55) (59 - 71)  BP: 111/64 (2020 11:55) (111/64 - 155/83)  RR: 18 (2020 11:55) (18 - 18)  SpO2: 96% (2020 11:55) (95% - 99%)    PHYSICAL EXAMINATION:  General: Well-developed, well nourished, in no acute distress.  Neurologic:  - Mental Status:  Alert, awake, oriented to person, place, and time; Speech is fluent with intact comprehension.  - Cranial Nerves II-XII: Visual fields are full to confronation; Pupils are equal, round, and reactive to light; Extraocular movements are intact without nystagmus. Facial sensation is intact in the V1-V3 distribution bilaterally. Face is symmetric with normal eye closure and smile. Hearing is intact to finger rub. Head turning and shoulder shrug are intact.  - Motor:  Strength is 5/5 throughout.  There is no pronator drift.  Normal muscle bulk and tone throughout.  - Sensory:  Intact throughout to LT.  - Gait: deferred                          13.1   8.58  )-----------( 171      ( 2020 05:58 )             39.4         137  |  100  |  14  ----------------------------<  98  4.4   |  24  |  0.90    Ca    9.1      2020 05:58      Urinalysis Basic - ( 2020 08:29 )  Color: Light Yellow / Appearance: Clear / S.014 / pH: x  Gluc: x / Ketone: Negative  / Bili: Negative / Urobili: <2 mg/dL   Blood: x / Protein: Negative / Nitrite: Negative   Leuk Esterase: Negative / RBC: x / WBC x   Sq Epi: x / Non Sq Epi: x / Bacteria: x      EEG (20):  1. Functional abnormality in the right frontal region.  2. No epileptiform abnormalities were recorded.    Radiology:  MR Head w/wo IV Cont:  (2020 18:41)  Redemonstration of T2 and FLAIR hyperintense signal within the mesial right temporal lobe in the region of the hippocampal and parahippocampal gyri, with associated subtle swelling. Differential diagnosis includes autoimmune encephalitis, herpes encephalitis, low-grade glioblastoma, and sequelae of recent seizure.  No abnormal parenchymal or leptomeningeal enhancement.  No acute intracranial hemorrhage or acute infarction.

## 2020-07-29 NOTE — PROGRESS NOTE ADULT - SUBJECTIVE AND OBJECTIVE BOX
Patient is a 75y old  Male who presents with a chief complaint of limbic encephalitis (29 Jul 2020 08:36)      HPI: 76 y/o M with PMHx BPH, HTN, and HLD presented to ED with intermittent left upper extremity spasms and weakness since the first week of June. His symptoms progressed to his right upper and lower extremity in late June with weakness, spasms and shaking. The weakness led to difficulty holding objects. He also had multiple falls. Outpatient MRI brain without contrast demonstrated hyperenhancement of right limbic lobe. 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  Autoimmune/paraneoplastic panels pending.     Interval events: s/p PLEX#3 on 7/27, tolerated well. No acute events overnight. Symptoms unchanged. He denies, fever, chills, HA, numbness, visual changes, CP, SOB, cough, bleeding, GI or  changes.      PAST MEDICAL & SURGICAL HISTORY:  Hypertension  HLD (hyperlipidemia)  BPH (benign prostatic hyperplasia)  No significant past surgical history      MEDICATIONS  (STANDING):  chlorhexidine 4% Liquid 1 Application(s) Topical <User Schedule>  cyanocobalamin Injectable 1000 MICROGram(s) IntraMuscular daily  enoxaparin Injectable 40 milliGRAM(s) SubCutaneous daily  finasteride 5 milliGRAM(s) Oral daily  lidocaine 1% Injectable 20 milliLiter(s) Local Injection once  simvastatin 10 milliGRAM(s) Oral at bedtime  valproic acid 250 milliGRAM(s) Oral three times a day    MEDICATIONS  (PRN):  LORazepam   Injectable 1 milliGRAM(s) IV Push once PRN for gtc seizure > 2 minutes  polyethylene glycol 3350 17 Gram(s) Oral daily PRN Constipation  sodium chloride 0.9% lock flush 10 milliLiter(s) IV Push every 1 hour PRN Pre/post blood products, medications, blood draw, and to maintain line patency      Allergies  No Known Allergies      REVIEW OF SYSTEMS:  CONSTITUTIONAL: No fevers or chills  EYES/ENT: No visual changes,  No vertigo or throat pain   RESPIRATORY: No shortness of breath, No cough, wheezing, hemoptysis   CARDIOVASCULAR: No chest pain or palpitations  GASTROINTESTINAL: No pain. No nausea, vomiting, or hematemesis; No diarrhea or constipation. No melena or hematochezia  GENITOURINARY: No pain, No hematuria  MUSCULOSKELETAL: No joint or muscle pain  NEUROLOGICAL: See HPI, No HA, No numbness  HEMATOLOGY: No bleeding  SKIN: No itching, burning, rashes, petechia, or lesions  ENDOCRINE: No heat or cold intolerance      Vital Signs Last 24 Hrs  T(C): 36.9 (29 Jul 2020 07:44), Max: 36.9 (28 Jul 2020 15:31)  T(F): 98.4 (29 Jul 2020 07:44), Max: 98.4 (28 Jul 2020 15:31)  HR: 59 (29 Jul 2020 07:44) (59 - 82)  BP: 135/84 (29 Jul 2020 07:44) (130/74 - 155/83)  RR: 18 (29 Jul 2020 07:44) (18 - 18)  SpO2: 96% (29 Jul 2020 07:44) (95% - 99%)                          13.1   8.58  )-----------( 171      ( 29 Jul 2020 05:58 )             39.4       Hematocrit: 39.4 % (07-29 @ 05:58)  Hematocrit: 39.9 % (07-27 @ 08:58)    07-29    137  |  100  |  14  ----------------------------<  98  4.4   |  24  |  0.90    Ca    9.1      29 Jul 2020 05:58

## 2020-07-29 NOTE — PROGRESS NOTE ADULT - ASSESSMENT
76yo man with PMHx BPH, HTN, and HLD presents to ED with intermittent left upper extremity tonic clonic activity with progression to right upper and lower extremities and multiple falls since June, concerning for seizures. Outpatient MRI brain without contrast demonstrates hyperenhancement of right limbic lobe.     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 Differentials also includes autoimmune encephalitis paraneoplastic encephalitis vs. other inflammatory encephalitis vs. infectious encephalitis vs. less likely focal dystonia.    Plan:  Discontinue VEEG  Day 4/5 of PLEX Therapy  Continue Valproic Acid 250 mg TID  OT Re-evaluation prior to discharge

## 2020-07-30 LAB
LGI1 AB IGG SCREEN BY IFA: DETECTED
LGI1 AB IGG TITER BY IFA: (no result)

## 2020-07-30 PROCEDURE — 99233 SBSQ HOSP IP/OBS HIGH 50: CPT

## 2020-07-30 PROCEDURE — 99222 1ST HOSP IP/OBS MODERATE 55: CPT | Mod: GC

## 2020-07-30 RX ORDER — VALPROIC ACID (AS SODIUM SALT) 250 MG/5ML
500 SOLUTION, ORAL ORAL AT BEDTIME
Refills: 0 | Status: DISCONTINUED | OUTPATIENT
Start: 2020-07-30 | End: 2020-07-30

## 2020-07-30 RX ORDER — VALPROIC ACID (AS SODIUM SALT) 250 MG/5ML
250 SOLUTION, ORAL ORAL
Refills: 0 | Status: DISCONTINUED | OUTPATIENT
Start: 2020-07-30 | End: 2020-08-03

## 2020-07-30 RX ORDER — VALPROIC ACID (AS SODIUM SALT) 250 MG/5ML
500 SOLUTION, ORAL ORAL AT BEDTIME
Refills: 0 | Status: DISCONTINUED | OUTPATIENT
Start: 2020-07-30 | End: 2020-08-03

## 2020-07-30 RX ADMIN — ENOXAPARIN SODIUM 40 MILLIGRAM(S): 100 INJECTION SUBCUTANEOUS at 11:33

## 2020-07-30 RX ADMIN — CHLORHEXIDINE GLUCONATE 1 APPLICATION(S): 213 SOLUTION TOPICAL at 05:19

## 2020-07-30 RX ADMIN — FINASTERIDE 5 MILLIGRAM(S): 5 TABLET, FILM COATED ORAL at 11:33

## 2020-07-30 RX ADMIN — SIMVASTATIN 10 MILLIGRAM(S): 20 TABLET, FILM COATED ORAL at 21:15

## 2020-07-30 RX ADMIN — Medication 500 MILLIGRAM(S): at 21:16

## 2020-07-30 RX ADMIN — Medication 250 MILLIGRAM(S): at 05:18

## 2020-07-30 NOTE — PROGRESS NOTE ADULT - SUBJECTIVE AND OBJECTIVE BOX
Patient had no events or complaints overnight.       MEDICATIONS  (STANDING):  chlorhexidine 4% Liquid 1 Application(s) Topical <User Schedule>  cyanocobalamin Injectable 1000 MICROGram(s) IntraMuscular daily  enoxaparin Injectable 40 milliGRAM(s) SubCutaneous daily  finasteride 5 milliGRAM(s) Oral daily  lidocaine 1% Injectable 20 milliLiter(s) Local Injection once  simvastatin 10 milliGRAM(s) Oral at bedtime  valproic acid 250 milliGRAM(s) Oral three times a day    MEDICATIONS  (PRN):  LORazepam   Injectable 1 milliGRAM(s) IV Push once PRN for gtc seizure > 2 minutes  polyethylene glycol 3350 17 Gram(s) Oral daily PRN Constipation  sodium chloride 0.9% lock flush 10 milliLiter(s) IV Push every 1 hour PRN Pre/post blood products, medications, blood draw, and to maintain line patency    Vital Signs Last 24 Hrs  T(C): 37.1 (2020 04:46), Max: 37.1 (2020 04:46)  T(F): 98.8 (2020 04:46), Max: 98.8 (2020 04:46)  HR: 86 (2020 04:46) (59 - 89)  BP: 135/65 (2020 04:46) (107/70 - 146/81)  RR: 19 (2020 04:46) (18 - 19)  SpO2: 98% (2020 04:46) (95% - 98%)    PHYSICAL EXAMINATION:  General: Well-developed, well nourished, in no acute distress.  Neurologic:  - Mental Status:  Alert, awake, oriented to person, place, and time; Speech is fluent with intact comprehension.  - Cranial Nerves II-XII: Visual fields are full to confronation; Pupils are equal, round, and reactive to light; Extraocular movements are intact without nystagmus. Facial sensation is intact in the V1-V3 distribution bilaterally. Face is symmetric with normal eye closure and smile. Hearing is intact to finger rub. Head turning and shoulder shrug are intact.  - Motor:  Strength is 5/5 throughout.  There is no pronator drift.  Normal muscle bulk and tone throughout.  - Sensory:  Intact throughout to LT.  - Gait: deferred                          13.1   8.58  )-----------( 171      ( 2020 05:58 )             39.4         137  |  100  |  14  ----------------------------<  98  4.4   |  24  |  0.90    Ca    9.1      2020 05:58      Urinalysis Basic - ( 2020 08:29 )  Color: Light Yellow / Appearance: Clear / S.014 / pH: x  Gluc: x / Ketone: Negative  / Bili: Negative / Urobili: <2 mg/dL   Blood: x / Protein: Negative / Nitrite: Negative   Leuk Esterase: Negative / RBC: x / WBC x   Sq Epi: x / Non Sq Epi: x / Bacteria: x      EEG (20):  1. Functional abnormality in the right frontal region.  2. No epileptiform abnormalities were recorded.    Radiology:  MR Head w/wo IV Cont:  (2020 18:41)  Redemonstration of T2 and FLAIR hyperintense signal within the mesial right temporal lobe in the region of the hippocampal and parahippocampal gyri, with associated subtle swelling. Differential diagnosis includes autoimmune encephalitis, herpes encephalitis, low-grade glioblastoma, and sequelae of recent seizure.  No abnormal parenchymal or leptomeningeal enhancement.  No acute intracranial hemorrhage or acute infarction. **PROGRESS NOTE***     Overnight: Patient stated he had several episodes of L arm "twitching/spasms" that lasted 30 seconds and were intermittent       MEDICATIONS  (STANDING):  chlorhexidine 4% Liquid 1 Application(s) Topical <User Schedule>  cyanocobalamin Injectable 1000 MICROGram(s) IntraMuscular daily  enoxaparin Injectable 40 milliGRAM(s) SubCutaneous daily  finasteride 5 milliGRAM(s) Oral daily  lidocaine 1% Injectable 20 milliLiter(s) Local Injection once  simvastatin 10 milliGRAM(s) Oral at bedtime  valproic acid 250 milliGRAM(s) Oral three times a day    MEDICATIONS  (PRN):  LORazepam   Injectable 1 milliGRAM(s) IV Push once PRN for gtc seizure > 2 minutes  polyethylene glycol 3350 17 Gram(s) Oral daily PRN Constipation  sodium chloride 0.9% lock flush 10 milliLiter(s) IV Push every 1 hour PRN Pre/post blood products, medications, blood draw, and to maintain line patency    Vital Signs Last 24 Hrs  T(C): 37.1 (2020 04:46), Max: 37.1 (2020 04:46)  T(F): 98.8 (2020 04:46), Max: 98.8 (2020 04:46)  HR: 86 (2020 04:46) (59 - 89)  BP: 135/65 (2020 04:46) (107/70 - 146/81)  RR: 19 (2020 04:46) (18 - 19)  SpO2: 98% (2020 04:46) (95% - 98%)    PHYSICAL EXAMINATION:  General: Well-developed, well nourished, in no acute distress.  Neurologic:  - Mental Status:  Alert, awake, oriented to person, place, and time; Speech is fluent with intact comprehension.  - Cranial Nerves II-XII: PERRL, EOMI, Facial sensation is intact in the V1-V3 distribution bilaterally. No facial palsy  - Motor:  Strength is 5/5 throughout.  There is no pronator drift.  Normal muscle bulk and tone throughout.  - Sensory:  Intact throughout to LT.  - Gait: deferred    Labs:                       13.1   8.58  )-----------( 171      ( 2020 05:58 )             39.4     07-29    137  |  100  |  14  ----------------------------<  98  4.4   |  24  |  0.90    Ca    9.1      2020 05:58      Urinalysis Basic - ( 2020 08:29 )  Color: Light Yellow / Appearance: Clear / S.014 / pH: x  Gluc: x / Ketone: Negative  / Bili: Negative / Urobili: <2 mg/dL   Blood: x / Protein: Negative / Nitrite: Negative   Leuk Esterase: Negative / RBC: x / WBC x   Sq Epi: x / Non Sq Epi: x / Bacteria: x      EEG (20):  1. Functional abnormality in the right frontal region.  2. No epileptiform abnormalities were recorded.    Radiology:    MR Head w/wo IV Cont: (2020)  Redemonstration of T2 and FLAIR hyperintense signal within the mesial right temporal lobe in the region of the hippocampal and parahippocampal gyri, with associated subtle swelling. Differential diagnosis includes autoimmune encephalitis, herpes encephalitis, low-grade glioblastoma, and sequelae of recent seizure.  No abnormal parenchymal or leptomeningeal enhancement.  No acute intracranial hemorrhage or acute infarction.

## 2020-07-30 NOTE — PROGRESS NOTE ADULT - ASSESSMENT
76 yo male PMHx BPH, HTN, and HLD presents to ED with intermittent left upper extremity tonic clonic activity with progression to right upper and lower extremities and multiple falls since June, concerning for seizures. Outpatient MRI brain without contrast demonstrates hyperenhancement of right limbic lobe.     # Bilateral upper extremity tonic clonic activity   # autoimmune encephalitis  # HTN  # HLD  # BPH  # lung nodules and tree in bud opacities    Appreciate neurology care  Follow up serum autoimmune workup  cont proscar  cont statin  holding amlodipine  cont PLEX per neuro 4/5, last dose 7/31  cont depacon  rituxan 7/31    PCP Dr. Tevin Ovalle  [facility]  [phone]  Dr. Chambers will be covering me tomorrow. Please call [facility] with questions [phone].

## 2020-07-30 NOTE — CONSULT NOTE ADULT - ATTENDING COMMENTS
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 Some R basilar scattered tree in bud opacities - indeterminate, including microaspiration, mucoid impaction, post viral URI. No clinical suspicion of pneumonia or active pulm infection    REC    No evidence of pulmonary neoplastic process on CT  No need for f/u CT to monitor "nodules"  No clinical suspicion of active pulm infection  If encephalitis could be post viral, sugg rapid PCR nasal swab
Patient seen and examined and cased discussed with resident. Agree with recommendations.   Patient would benefit from acute inpatient rehabilitation for functional deficits secondary to encephalitis
As above, can perform procedure to provide venous access for plasmapheresis.  Will schedule within the next 24 hours.
Advanced care planning was discussed with patient and family.  Advanced care planning forms were reviewed and discussed as appropriate.  Differential diagnosis and plan of care discussed with patient after the evaluation.   Pain assessed and judicious use of narcotics when appropriate was discussed.  Importance of Fall prevention discussed.  Counseling on Smoking and Alcohol cessation was offered when appropriate.  Counseling on Diet, exercise, and medication compliance was done.

## 2020-07-30 NOTE — PROGRESS NOTE ADULT - SUBJECTIVE AND OBJECTIVE BOX
Patient is a 75y old  Male who presents with a chief complaint of limbic encephalitis (30 Jul 2020 10:28)      INTERVAL HPI/OVERNIGHT EVENTS: noted  pt seen and examined  had several episodes of L arm twitching,      Vital Signs Last 24 Hrs  T(C): 36.8 (30 Jul 2020 20:20), Max: 37.1 (30 Jul 2020 04:46)  T(F): 98.2 (30 Jul 2020 20:20), Max: 98.8 (30 Jul 2020 04:46)  HR: 76 (30 Jul 2020 20:20) (69 - 86)  BP: 121/66 (30 Jul 2020 20:20) (107/70 - 143/85)  BP(mean): --  RR: 18 (30 Jul 2020 20:20) (17 - 19)  SpO2: 96% (30 Jul 2020 20:20) (95% - 98%)    chlorhexidine 4% Liquid 1 Application(s) Topical <User Schedule>  enoxaparin Injectable 40 milliGRAM(s) SubCutaneous daily  finasteride 5 milliGRAM(s) Oral daily  lidocaine 1% Injectable 20 milliLiter(s) Local Injection once  LORazepam   Injectable 1 milliGRAM(s) IV Push once PRN  polyethylene glycol 3350 17 Gram(s) Oral daily PRN  simvastatin 10 milliGRAM(s) Oral at bedtime  sodium chloride 0.9% lock flush 10 milliLiter(s) IV Push every 1 hour PRN  valproic acid 500 milliGRAM(s) Oral at bedtime  valproic acid 250 milliGRAM(s) Oral <User Schedule>      PHYSICAL EXAM:  GENERAL: NAD,   EYES: conjunctiva and sclera clear  ENMT: Moist mucous membranes  NECK: Supple, No JVD, Normal thyroid  CHEST/LUNG: non labored, cta b/l  HEART: Regular rate and rhythm; No murmurs, rubs, or gallops  ABDOMEN: Soft, Nontender, Nondistended; Bowel sounds present  EXTREMITIES:  2+ Peripheral Pulses, No clubbing, cyanosis, or edema  LYMPH: No lymphadenopathy noted  SKIN: No rashes or lesions    Consultant(s) Notes Reviewed:  [x ] YES  [ ] NO  Care Discussed with Consultants/Other Providers [ x] YES  [ ] NO    LABS:                        13.1   8.58  )-----------( 171      ( 29 Jul 2020 05:58 )             39.4     07-29    137  |  100  |  14  ----------------------------<  98  4.4   |  24  |  0.90    Ca    9.1      29 Jul 2020 05:58          CAPILLARY BLOOD GLUCOSE                  RADIOLOGY & ADDITIONAL TESTS:    Imaging Personally Reviewed:  [x ] YES  [ ] NO

## 2020-07-30 NOTE — PROGRESS NOTE ADULT - ASSESSMENT
74yo man with PMHx BPH, HTN, and HLD presents to ED with intermittent left upper extremity tonic clonic activity with progression to right upper and lower extremities and multiple falls since June, concerning for seizures. Outpatient MRI brain without contrast demonstrates hyperenhancement of right limbic lobe.     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 Differentials also includes autoimmune encephalitis paraneoplastic encephalitis vs. other inflammatory encephalitis vs. infectious encephalitis vs. less likely focal dystonia.    Plan:  Discontinue VEEG  Day 4/5 of PLEX Therapy  Continue Valproic Acid 250 mg TID  OT Re-evaluation prior to discharge 76yo man with PMHx BPH, HTN, and HLD presents to ED with intermittent left upper extremity tonic clonic activity with progression to right upper and lower extremities and multiple falls since June, concerning for seizures. Outpatient MRI brain without contrast demonstrates hyperenhancement of right limbic lobe.     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 Differentials also includes autoimmune encephalitis paraneoplastic encephalitis vs. other inflammatory encephalitis vs. infectious encephalitis vs. less likely focal dystonia.    Plan:  [] Discontinue vEEG  [] PLEX 4/5 treatments completed, last dose 7/31/20  [] Stop Valproic Acid 250 mg TID, Start Valproic Acid 250 mg AM, and Valproic Acid 500 mg PM dose   [] Start Rituxan one time treatment 7/31/20   [] OT Re-evaluation prior to discharge

## 2020-07-30 NOTE — PROGRESS NOTE ADULT - SUBJECTIVE AND OBJECTIVE BOX
Subjective: Patient seen and examined. No new events except as noted.     REVIEW OF SYSTEMS:    CONSTITUTIONAL: No weakness, fevers or chills  EYES/ENT: No visual changes;  No vertigo or throat pain   NECK: No pain or stiffness  RESPIRATORY: No cough, wheezing, hemoptysis; No shortness of breath  CARDIOVASCULAR: No chest pain or palpitations  GASTROINTESTINAL: No abdominal or epigastric pain. No nausea, vomiting, or hematemesis; No diarrhea or constipation. No melena or hematochezia.  GENITOURINARY: No dysuria, frequency or hematuria  NEUROLOGICAL: No numbness or weakness  SKIN: No itching, burning, rashes, or lesions   All other review of systems is negative unless indicated above.    MEDICATIONS:  MEDICATIONS  (STANDING):  chlorhexidine 4% Liquid 1 Application(s) Topical <User Schedule>  enoxaparin Injectable 40 milliGRAM(s) SubCutaneous daily  finasteride 5 milliGRAM(s) Oral daily  lidocaine 1% Injectable 20 milliLiter(s) Local Injection once  simvastatin 10 milliGRAM(s) Oral at bedtime  valproic acid 250 milliGRAM(s) Oral <User Schedule>  valproic acid 500 milliGRAM(s) Oral at bedtime      PHYSICAL EXAM:  T(C): 37 (07-30-20 @ 08:04), Max: 37.1 (07-30-20 @ 04:46)  HR: 79 (07-30-20 @ 08:04) (71 - 89)  BP: 143/85 (07-30-20 @ 08:04) (107/70 - 146/81)  RR: 18 (07-30-20 @ 08:04) (18 - 19)  SpO2: 95% (07-30-20 @ 08:04) (95% - 98%)  Wt(kg): --  I&O's Summary    29 Jul 2020 07:01  -  30 Jul 2020 07:00  --------------------------------------------------------  IN: 600 mL / OUT: 0 mL / NET: 600 mL          Appearance: Normal	  HEENT:   Normal oral mucosa, PERRL, EOMI	  Lymphatic: No lymphadenopathy , no edema  Cardiovascular: Normal S1 S2, No JVD, No murmurs , Peripheral pulses palpable 2+ bilaterally  Respiratory: Lungs clear to auscultation, normal effort 	  Gastrointestinal:  Soft, Non-tender, + BS	  Skin: No rashes, No ecchymoses, No cyanosis, warm to touch  Musculoskeletal: Normal range of motion, normal strength  Psychiatry:  Mood & affect appropriate  Ext: No edema      LABS:    CARDIAC MARKERS:                                13.1   8.58  )-----------( 171      ( 29 Jul 2020 05:58 )             39.4     07-29    137  |  100  |  14  ----------------------------<  98  4.4   |  24  |  0.90    Ca    9.1      29 Jul 2020 05:58      proBNP:   Lipid Profile:   HgA1c:   TSH:           TELEMETRY: SR	    ECG:  	  RADIOLOGY:   DIAGNOSTIC TESTING:  [ ] Echocardiogram:  [ ]  Catheterization:  [ ] Stress Test:    OTHER:

## 2020-07-30 NOTE — PROGRESS NOTE ADULT - ATTENDING COMMENTS
Rituxan 1000 Friday after last PLEX  IVIG as outpatient  repeat Rituxan in 2 weeks  whole body PET as outpatient in 6 months

## 2020-07-30 NOTE — CONSULT NOTE ADULT - PROVIDER SPECIALTY LIST ADULT
Pulmonology
Intervent Radiology
Rehab Medicine
Cardiology
Internal Medicine
Transfusion Medicine
Neurology

## 2020-07-30 NOTE — CONSULT NOTE ADULT - CONSULT REQUESTED DATE/TIME
21-Jul-2020 18:10
22-Jul-2020 08:42
22-Jul-2020 09:48
22-Jul-2020 10:41
23-Jul-2020 09:00
30-Jul-2020 09:19
24-Jul-2020 14:32

## 2020-07-30 NOTE — CONSULT NOTE ADULT - REASON FOR ADMISSION
limbic encephalitis

## 2020-07-30 NOTE — CONSULT NOTE ADULT - SUBJECTIVE AND OBJECTIVE BOX
Patient is a 75y old  Male who presents with a chief complaint of limbic encephalitis (30 Jul 2020 06:27)      HPI:  Shawn Alan is 76 y/o RH male with PMHx BPH, HTN, and HLD who presented to the [facility] ED on 7/21/20 with intermittent left upper extremity spasms and weakness since the first week of June. Patient reported that symptoms had progressed to include his right upper and lower extremity. He stated that in the beginning of June, he had pain below his left ear. A couple weeks later, patient started to have stiffness of his left upper extremity that felt like spasms and was accompanied by shaking. He then began to have involvement of his right side with involvement of his right upper and right lower extremity. The shaking was usually followed by weakness, which lasted a few minutes before he regained full function. The weakness in his arms led him to break dishes multiple times. He did not know how often the episodes occurred, but said they were very frequent when he does his daily activities. He was unable to recall exactly when the last one was. The patient has had multiple falls since June, most recently a few weeks prior to admission that he attributed to loss of balance. He denied syncope, loss of consciousness, tongue biting, and urinary incontinence. Patient denied previous history of a neurological disorder, including seizures. Patient does not ambulate with mechanical assistance at baseline. He lives alone and says that the episodes have not been witnessed.  Patient had an MRI brain without contrast 7/20/20 after going to a private neurologist which noted: Asymmetric FLAIR hyperintensity in the right limbic lobe involving the hippocampal gyrus. Differential diagnosis includes, but is not limited to, infectious/inflammatory encephalitis, limbic encephalitis and sequela of epilepsy/post ictal edema. Additional mild chronic microvascular changes without acute infarct.    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    Patient underwent an MRI at [facility] on 7/22/20 which noted: Redemonstration of T2 and FLAIR hyperintense signal within the mesial right temporal lobe in the region of the hippocampal and parahippocampal gyri, with associated subtle swelling. Differential diagnosis includes autoimmune encephalitis, herpes encephalitis, low-grade glioblastoma, and sequelae of recent seizure. No abnormal parenchymal or leptomeningeal enhancement. No acute intracranial hemorrhage or acute infarction.    EEG performed 7/22/20 noted: Electrographical seizures, right temporal, with spread to the left, Occasional sharp wave, focal, right temporal, Bitemporal TIRDA, Intermittent focal slowing, bilateral temporal, Diffuse excess beta activity.     LP was performed with no signs of infection, CSF studies for encephalopathy and paraneoplastic panel pending.    Patient was started on PLEX therapy and will complete a 5-session course on 7/31..  He was also started on VPA.      CT chest/abd/pelvis was performed which noted a 0.5 cm perifissural nodule along the right minor fissure, likely intrapulmonary lymph node. Also a triangular 0.2 cm nodule in the right middle lobe (series 3, image 124), possible intrapulmonary lymph node. Chronic tree-in-bud opacities in the periphery of the right lower lobe. Scattered calcified granulomata.     Concern was raised as encephalitis may precede tumor progression up to 2 years. Pulmonology was consulted through patient's internal medicine provider who recommended no further need for follow up CT for monitoring and no suspicion of active infection. During admission, patient had episodes of bradycardia, other vitals had remained stable.          ------------------------------------      REVIEW OF SYSTEMS  Constitutional - No fever, No fatigue  HEENT - No visual disturbances, No difficulty hearing, No tinnitus, No vertigo  Respiratory - No cough, No wheezing, No shortness of breath  Cardiovascular - No chest pain, No palpitations  Gastrointestinal - No abdominal pain, No nausea, No vomiting, No diarrhea, No constipation  Genitourinary - No dysuria, No frequency, No hematuria, No incontinence  Neurological - No headaches, No memory loss, No loss of strength, No numbness  Skin -  No lesions   Musculoskeletal - No joint pain, No joint swelling, No muscle pain  Psychiatric - No depression, No anxiety    PAST MEDICAL & SURGICAL HISTORY  Hypertension  HLD (hyperlipidemia)  BPH (benign prostatic hyperplasia)  No significant past surgical history      SOCIAL HISTORY  Smoking - Denied  EtOH - Denied   Drugs - Denied    FUNCTIONAL HISTORY  Prior to admission pt reports being independent of all ADL's & functional mobility without AD. Pt resides in apt alone, 3 steps to enter, (+) b/l rail, (+) elevator up to floor. (+) glasses for distance and reading. Pt has tub. (+) driving    CURRENT FUNCTIONAL STATUS as of 7/29/20  Bed Mobility - CG x1 person   Transfers - supervision   Gait - ambulated 50ft with RW and supervision; ambulated add'l 50ft w/o AD with CGA   Stairs - negotiated 9 stairs with supervision and unilateral HR   Cognition - pt demonstrating normal cognition but poor safety awareness; unable to recall number for police or what to do in event of a fire.      FAMILY HISTORY   n/a    ALLERGIES  No Known Allergies      MEDICATIONS   chlorhexidine 4% Liquid 1 Application(s) Topical <User Schedule>  enoxaparin Injectable 40 milliGRAM(s) SubCutaneous daily  finasteride 5 milliGRAM(s) Oral daily  lidocaine 1% Injectable 20 milliLiter(s) Local Injection once  LORazepam   Injectable 1 milliGRAM(s) IV Push once PRN  polyethylene glycol 3350 17 Gram(s) Oral daily PRN  simvastatin 10 milliGRAM(s) Oral at bedtime  sodium chloride 0.9% lock flush 10 milliLiter(s) IV Push every 1 hour PRN  valproic acid 250 milliGRAM(s) Oral three times a day    Home medications:  Finasteride 5mg daily  Amlodipine 5mg daily  Simvastatin 10mg nightly  Tizanidine 2mg at bedtime PRN      VITALS  T(C): 37 (07-30-20 @ 08:04), Max: 37.1 (07-30-20 @ 04:46)  HR: 79 (07-30-20 @ 08:04) (71 - 89)  BP: 143/85 (07-30-20 @ 08:04) (107/70 - 146/81)  RR: 18 (07-30-20 @ 08:04) (18 - 19)  SpO2: 95% (07-30-20 @ 08:04) (95% - 98%)  Wt(kg): --    RECENT LABS/IMAGING  CBC Full  -  ( 29 Jul 2020 05:58 )  WBC Count : 8.58 K/uL  RBC Count : 4.44 M/uL  Hemoglobin : 13.1 g/dL  Hematocrit : 39.4 %  Platelet Count - Automated : 171 K/uL  Mean Cell Volume : 88.7 fl  Mean Cell Hemoglobin : 29.5 pg  Mean Cell Hemoglobin Concentration : 33.2 gm/dL  Auto Neutrophil # : x  Auto Lymphocyte # : x  Auto Monocyte # : x  Auto Eosinophil # : x  Auto Basophil # : x  Auto Neutrophil % : x  Auto Lymphocyte % : x  Auto Monocyte % : x  Auto Eosinophil % : x  Auto Basophil % : x    07-29    137  |  100  |  14  ----------------------------<  98  4.4   |  24  |  0.90    Ca    9.1      29 Jul 2020 05:58    ----------------------------------------------------------------------------------------  PHYSICAL EXAM  Constitutional - NAD, Comfortable  HEENT - NCAT, EOMI  Neck - Supple, No limited ROM  Chest - Breathing comfortably, No wheezing  Cardiovascular - S1S2   Abdomen - Soft   Extremities - No C/C/E, No calf tenderness   Neurologic Exam -                    Cognitive - Awake, Alert, AAO to self, place, date, year, situation     Communication - Fluent, No dysarthria     Cranial Nerves - CN 2-12 intact     Motor -                     LEFT    UE - ShAB 5/5, EF 5/5, EE 5/5, WE 5/5,  5/5                    RIGHT UE - ShAB 5/5, EF 5/5, EE 5/5, WE 5/5,  5/5                    LEFT    LE - HF 5/5, KE 5/5, DF 5/5, PF 5/5                    RIGHT LE - HF 5/5, KE 5/5, DF 5/5, PF 5/5        Sensory - Intact to LT     Reflexes - DTR Intact, No primitive reflexive     Coordination - FTN intact     OculoVestibular - No saccades, No nystagmus, VOR         Balance - WNL Static  Psychiatric - Mood stable, Affect WNL  ----------------------------------------------------------------------------------------  ASSESSMENT/PLAN  75y Male with functional deficits after treatment for presumed autoimmune encephalitis.   Encephalitis - s/p PLEX.  c/w VPA for sz control.   Pain - Tylenol PRN  BPY - c/w finasteride  HLD - c/w statin  PT/OT - daily therapies for ROM, increased mobility, and decreased debility related to hospitalization.  Balance, gait training, ADLs, transfers, and bracing/assistive devices as needed.    DVT PPX - lovenox  Rehab - PENDING EVALUATION Patient is a 75y old  Male who presents with a chief complaint of limbic encephalitis (30 Jul 2020 06:27)      HPI:  Shawn Alan is 76 y/o RH male with PMHx BPH, HTN, and HLD who presented to the [facility] ED on 7/21/20 with intermittent left upper extremity spasms and weakness since the first week of June. Patient reported that symptoms had progressed to include his right upper and lower extremity. He stated that in the beginning of June, he had pain below his left ear. A couple weeks later, patient started to have stiffness of his left upper extremity that felt like spasms and was accompanied by shaking. He then began to have involvement of his right side with involvement of his right upper and right lower extremity. The shaking was usually followed by weakness, which lasted a few minutes before he regained full function. The weakness in his arms led him to break dishes multiple times. He did not know how often the episodes occurred, but said they were very frequent when he does his daily activities. He was unable to recall exactly when the last one was. The patient has had multiple falls since June, most recently a few weeks prior to admission that he attributed to loss of balance. He denied syncope, loss of consciousness, tongue biting, and urinary incontinence. Patient denied previous history of a neurological disorder, including seizures. Patient does not ambulate with mechanical assistance at baseline. He lives alone and says that the episodes have not been witnessed.  Patient had an MRI brain without contrast 7/20/20 after going to a private neurologist which noted: Asymmetric FLAIR hyperintensity in the right limbic lobe involving the hippocampal gyrus. Differential diagnosis includes, but is not limited to, infectious/inflammatory encephalitis, limbic encephalitis and sequela of epilepsy/post ictal edema. Additional mild chronic microvascular changes without acute infarct.    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    Patient underwent an MRI at [facility] on 7/22/20 which noted: Redemonstration of T2 and FLAIR hyperintense signal within the mesial right temporal lobe in the region of the hippocampal and parahippocampal gyri, with associated subtle swelling. Differential diagnosis includes autoimmune encephalitis, herpes encephalitis, low-grade glioblastoma, and sequelae of recent seizure. No abnormal parenchymal or leptomeningeal enhancement. No acute intracranial hemorrhage or acute infarction.    EEG performed 7/22/20 noted: Electrographical seizures, right temporal, with spread to the left, Occasional sharp wave, focal, right temporal, Bitemporal TIRDA, Intermittent focal slowing, bilateral temporal, Diffuse excess beta activity.     LP was performed with no signs of infection, CSF studies for encephalopathy and paraneoplastic panel pending.    Patient was started on PLEX therapy and will complete a 5-session course on 7/31..  He was also started on VPA.      CT chest/abd/pelvis was performed which noted a 0.5 cm perifissural nodule along the right minor fissure, likely intrapulmonary lymph node. Also a triangular 0.2 cm nodule in the right middle lobe (series 3, image 124), possible intrapulmonary lymph node. Chronic tree-in-bud opacities in the periphery of the right lower lobe. Scattered calcified granulomata.     Concern was raised as encephalitis may precede tumor progression up to 2 years. Pulmonology was consulted through patient's internal medicine provider who recommended no further need for follow up CT for monitoring and no suspicion of active infection. During admission, patient had episodes of bradycardia, other vitals had remained stable.    Pt seen and examined at bedside.      ------------------------------------      REVIEW OF SYSTEMS  Constitutional - No fever, No fatigue  HEENT - No visual disturbances, No difficulty hearing, No tinnitus, No vertigo  Respiratory - No cough, No wheezing, No shortness of breath  Cardiovascular - No chest pain, No palpitations  Gastrointestinal - No abdominal pain, No nausea, No vomiting, No diarrhea, No constipation  Genitourinary - No dysuria, No frequency, No hematuria, No incontinence  Neurological - No headaches, No memory loss, No loss of strength, No numbness  Skin -  No lesions   Musculoskeletal - No joint pain, No joint swelling, No muscle pain  Psychiatric - No depression, No anxiety    PAST MEDICAL & SURGICAL HISTORY  Hypertension  HLD (hyperlipidemia)  BPH (benign prostatic hyperplasia)  No significant past surgical history      SOCIAL HISTORY  Smoking - Denied  EtOH - Denied   Drugs - Denied    FUNCTIONAL HISTORY  Prior to admission pt reports being independent of all ADL's & functional mobility without AD. Pt resides in apt alone, 3 steps to enter, (+) b/l rail, (+) elevator up to floor. (+) glasses for distance and reading. Pt has tub. (+) driving    CURRENT FUNCTIONAL STATUS as of 7/29/20  Bed Mobility - CG x1 person   Transfers - supervision   Gait - ambulated 50ft with RW and supervision; ambulated add'l 50ft w/o AD with CGA   Stairs - negotiated 9 stairs with supervision and unilateral HR   Cognition - pt demonstrating normal cognition but poor safety awareness; unable to recall number for police or what to do in event of a fire.      FAMILY HISTORY   n/a    ALLERGIES  No Known Allergies      MEDICATIONS   chlorhexidine 4% Liquid 1 Application(s) Topical <User Schedule>  enoxaparin Injectable 40 milliGRAM(s) SubCutaneous daily  finasteride 5 milliGRAM(s) Oral daily  lidocaine 1% Injectable 20 milliLiter(s) Local Injection once  LORazepam   Injectable 1 milliGRAM(s) IV Push once PRN  polyethylene glycol 3350 17 Gram(s) Oral daily PRN  simvastatin 10 milliGRAM(s) Oral at bedtime  sodium chloride 0.9% lock flush 10 milliLiter(s) IV Push every 1 hour PRN  valproic acid 250 milliGRAM(s) Oral three times a day    Home medications:  Finasteride 5mg daily  Amlodipine 5mg daily  Simvastatin 10mg nightly  Tizanidine 2mg at bedtime PRN      VITALS  T(C): 37 (07-30-20 @ 08:04), Max: 37.1 (07-30-20 @ 04:46)  HR: 79 (07-30-20 @ 08:04) (71 - 89)  BP: 143/85 (07-30-20 @ 08:04) (107/70 - 146/81)  RR: 18 (07-30-20 @ 08:04) (18 - 19)  SpO2: 95% (07-30-20 @ 08:04) (95% - 98%)  Wt(kg): --    RECENT LABS/IMAGING  CBC Full  -  ( 29 Jul 2020 05:58 )  WBC Count : 8.58 K/uL  RBC Count : 4.44 M/uL  Hemoglobin : 13.1 g/dL  Hematocrit : 39.4 %  Platelet Count - Automated : 171 K/uL  Mean Cell Volume : 88.7 fl  Mean Cell Hemoglobin : 29.5 pg  Mean Cell Hemoglobin Concentration : 33.2 gm/dL  Auto Neutrophil # : x  Auto Lymphocyte # : x  Auto Monocyte # : x  Auto Eosinophil # : x  Auto Basophil # : x  Auto Neutrophil % : x  Auto Lymphocyte % : x  Auto Monocyte % : x  Auto Eosinophil % : x  Auto Basophil % : x    07-29    137  |  100  |  14  ----------------------------<  98  4.4   |  24  |  0.90    Ca    9.1      29 Jul 2020 05:58    ----------------------------------------------------------------------------------------  PHYSICAL EXAM  Constitutional - NAD, Comfortable  HEENT - NCAT, EOMI  Neck - Supple, No limited ROM  Chest - Breathing comfortably, No wheezing  Cardiovascular - S1S2   Abdomen - Soft   Extremities - No C/C/E, No calf tenderness   Neurologic Exam -                    Cognitive - Awake, Alert, AAO to self, place, date, year, situation     Communication - Fluent, No dysarthria     Cranial Nerves - CN 2-12 intact     Motor -                     LEFT    UE - ShAB 5/5, EF 5/5, EE 5/5, WE 5/5,  5/5                    RIGHT UE - ShAB 5/5, EF 5/5, EE 5/5, WE 5/5,  5/5                    LEFT    LE - HF 5/5, KE 5/5, DF 5/5, PF 5/5                    RIGHT LE - HF 5/5, KE 5/5, DF 5/5, PF 5/5        Sensory - Intact to LT       Balance - WNL Static.   Psychiatric - Mood mildly depressed, Affect flat  ----------------------------------------------------------------------------------------  ASSESSMENT/PLAN  75y Male with functional deficits after treatment for presumed autoimmune encephalitis.   Encephalitis - s/p PLEX.  c/w VPA for sz control. Management per neuro.   Pain - Tylenol PRN  BPY - c/w finasteride  HLD - c/w statin  PT/OT - daily therapies for ROM, increased mobility, and decreased debility related to hospitalization.  Balance, gait training, ADLs, transfers, and bracing/assistive devices as needed.    DVT PPX - lovenox  Rehab - Recommend ACUTE inpatient rehabilitation for the functional and cognitive deficits consisting of 3 hours of therapy/day & 24 hour RN/daily PMR physician for comorbid medical management. Will continue to follow for ongoing rehab needs and recommendations. Patient will be able to tolerate 3 hours a day.  Discussed recommendation with patient, who is amenable to rehab.    Will continue to follow.  Please continue daily therapies as well as OOBTC multiple times per day in order to prevent cardiovascular complications and minimize debility.

## 2020-07-31 LAB
ANION GAP SERPL CALC-SCNC: 13 MMOL/L — SIGNIFICANT CHANGE UP (ref 5–17)
BUN SERPL-MCNC: 14 MG/DL — SIGNIFICANT CHANGE UP (ref 7–23)
CA-I BLD-SCNC: 1.17 MMOL/L — SIGNIFICANT CHANGE UP (ref 1.12–1.3)
CALCIUM SERPL-MCNC: 9.1 MG/DL — SIGNIFICANT CHANGE UP (ref 8.4–10.5)
CHLORIDE SERPL-SCNC: 97 MMOL/L — SIGNIFICANT CHANGE UP (ref 96–108)
CO2 SERPL-SCNC: 25 MMOL/L — SIGNIFICANT CHANGE UP (ref 22–31)
CREAT SERPL-MCNC: 0.92 MG/DL — SIGNIFICANT CHANGE UP (ref 0.5–1.3)
FSP PPP-MCNC: < 5 — SIGNIFICANT CHANGE UP
GLUCOSE SERPL-MCNC: 96 MG/DL — SIGNIFICANT CHANGE UP (ref 70–99)
HCT VFR BLD CALC: 37.7 % — LOW (ref 39–50)
HGB BLD-MCNC: 12.6 G/DL — LOW (ref 13–17)
MCHC RBC-ENTMCNC: 29.6 PG — SIGNIFICANT CHANGE UP (ref 27–34)
MCHC RBC-ENTMCNC: 33.4 GM/DL — SIGNIFICANT CHANGE UP (ref 32–36)
MCV RBC AUTO: 88.7 FL — SIGNIFICANT CHANGE UP (ref 80–100)
NRBC # BLD: 0 /100 WBCS — SIGNIFICANT CHANGE UP (ref 0–0)
PLATELET # BLD AUTO: 175 K/UL — SIGNIFICANT CHANGE UP (ref 150–400)
POTASSIUM SERPL-MCNC: 4.4 MMOL/L — SIGNIFICANT CHANGE UP (ref 3.5–5.3)
POTASSIUM SERPL-SCNC: 4.4 MMOL/L — SIGNIFICANT CHANGE UP (ref 3.5–5.3)
RBC # BLD: 4.25 M/UL — SIGNIFICANT CHANGE UP (ref 4.2–5.8)
RBC # FLD: 14.1 % — SIGNIFICANT CHANGE UP (ref 10.3–14.5)
SODIUM SERPL-SCNC: 135 MMOL/L — SIGNIFICANT CHANGE UP (ref 135–145)
WBC # BLD: 9.23 K/UL — SIGNIFICANT CHANGE UP (ref 3.8–10.5)
WBC # FLD AUTO: 9.23 K/UL — SIGNIFICANT CHANGE UP (ref 3.8–10.5)

## 2020-07-31 PROCEDURE — 99233 SBSQ HOSP IP/OBS HIGH 50: CPT

## 2020-07-31 PROCEDURE — 36514 APHERESIS PLASMA: CPT

## 2020-07-31 RX ORDER — DIPHENHYDRAMINE HCL 50 MG
50 CAPSULE ORAL ONCE
Refills: 0 | Status: COMPLETED | OUTPATIENT
Start: 2020-07-31 | End: 2020-08-03

## 2020-07-31 RX ORDER — ACETAMINOPHEN 500 MG
650 TABLET ORAL ONCE
Refills: 0 | Status: COMPLETED | OUTPATIENT
Start: 2020-07-31 | End: 2020-08-03

## 2020-07-31 RX ORDER — HYDROCORTISONE 20 MG
100 TABLET ORAL ONCE
Refills: 0 | Status: COMPLETED | OUTPATIENT
Start: 2020-07-31 | End: 2020-08-03

## 2020-07-31 RX ORDER — HYDROCORTISONE 20 MG
100 TABLET ORAL ONCE
Refills: 0 | Status: DISCONTINUED | OUTPATIENT
Start: 2020-07-31 | End: 2020-08-04

## 2020-07-31 RX ORDER — RITUXIMAB 10 MG/ML
1000 INJECTION, SOLUTION INTRAVENOUS ONCE
Refills: 0 | Status: COMPLETED | OUTPATIENT
Start: 2020-07-31 | End: 2020-08-03

## 2020-07-31 RX ADMIN — Medication 250 MILLIGRAM(S): at 05:41

## 2020-07-31 RX ADMIN — ENOXAPARIN SODIUM 40 MILLIGRAM(S): 100 INJECTION SUBCUTANEOUS at 11:51

## 2020-07-31 RX ADMIN — Medication 500 MILLIGRAM(S): at 21:33

## 2020-07-31 RX ADMIN — FINASTERIDE 5 MILLIGRAM(S): 5 TABLET, FILM COATED ORAL at 11:51

## 2020-07-31 RX ADMIN — CHLORHEXIDINE GLUCONATE 1 APPLICATION(S): 213 SOLUTION TOPICAL at 05:42

## 2020-07-31 RX ADMIN — SIMVASTATIN 10 MILLIGRAM(S): 20 TABLET, FILM COATED ORAL at 21:31

## 2020-07-31 NOTE — PROGRESS NOTE ADULT - SUBJECTIVE AND OBJECTIVE BOX
Patient is a 75y old  Male who presents with a chief complaint of limbic encephalitis (31 Jul 2020 11:04)      HPI:  HPI: 76 y/o M with PMHx BPH, HTN, and HLD presented to ED with intermittent left upper extremity spasms and weakness since the first week of June. His symptoms progressed to his right upper and lower extremity in late June with weakness, spasms and shaking. The weakness led to difficulty holding objects. He also had multiple falls. Outpatient MRI brain without contrast demonstrated hyperenhancement of right limbic lobe. 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  Autoimmune/paraneoplastic panels pending.     Interval events: s/p PLEX#4 on 7/29, tolerated well. No acute events overnight.  Patient endorses that weakness/spasms are unchanged. He denies fever, chills, HA, numbness, visual changes, CP, SOB, cough, bleeding, GI or  changes.      PAST MEDICAL & SURGICAL HISTORY:  Hypertension  HLD (hyperlipidemia)  BPH (benign prostatic hyperplasia)  No significant past surgical history      MEDICATIONS  (STANDING):  acetaminophen   Tablet .. 650 milliGRAM(s) Oral once  chlorhexidine 4% Liquid 1 Application(s) Topical <User Schedule>  diphenhydrAMINE   Injectable 50 milliGRAM(s) IV Push once  enoxaparin Injectable 40 milliGRAM(s) SubCutaneous daily  finasteride 5 milliGRAM(s) Oral daily  hydrocortisone sodium succinate Injectable 100 milliGRAM(s) IV Push once  lidocaine 1% Injectable 20 milliLiter(s) Local Injection once  riTUXimab IVPB (Non - oncologic) 1000 milliGRAM(s) IV Intermittent once  simvastatin 10 milliGRAM(s) Oral at bedtime  valproic acid 500 milliGRAM(s) Oral at bedtime  valproic acid 250 milliGRAM(s) Oral <User Schedule>    MEDICATIONS  (PRN):  hydrocortisone sodium succinate Injectable 100 milliGRAM(s) IV Push once PRN Infusion reaction  LORazepam   Injectable 1 milliGRAM(s) IV Push once PRN for gtc seizure > 2 minutes  polyethylene glycol 3350 17 Gram(s) Oral daily PRN Constipation  sodium chloride 0.9% lock flush 10 milliLiter(s) IV Push every 1 hour PRN Pre/post blood products, medications, blood draw, and to maintain line patency      Allergies  No Known Allergies      REVIEW OF SYSTEMS:  CONSTITUTIONAL: No fevers or chills  EYES/ENT: No visual changes,  No vertigo or throat pain   RESPIRATORY: No shortness of breath, No cough, wheezing, hemoptysis   CARDIOVASCULAR: No chest pain or palpitations  GASTROINTESTINAL: No pain. No nausea, vomiting, or hematemesis; No diarrhea or constipation. No melena or hematochezia  GENITOURINARY: No pain, No hematuria  MUSCULOSKELETAL: No joint or muscle pain  NEUROLOGICAL: See HPI, No HA, No numbness  HEMATOLOGY: No bleeding  SKIN: No itching, burning, rashes, petechia, or lesions  ENDOCRINE: No heat or cold intolerance    Vital Signs Last 24 Hrs  T(C): 36.8 (31 Jul 2020 11:28), Max: 37.2 (30 Jul 2020 23:29)  T(F): 98.3 (31 Jul 2020 11:28), Max: 99 (30 Jul 2020 23:29)  HR: 78 (31 Jul 2020 11:28) (56 - 79)  BP: 116/75 (31 Jul 2020 11:28) (108/69 - 146/82)  RR: 20 (31 Jul 2020 11:28) (17 - 20)  SpO2: 95% (31 Jul 2020 11:28) (95% - 97%)                          12.6   9.23  )-----------( 175      ( 31 Jul 2020 06:52 )             37.7       Hematocrit: 37.7 % (07-31 @ 06:52)  Hematocrit: 39.4 % (07-29 @ 05:58)    07-31    135  |  97  |  14  ----------------------------<  96  4.4   |  25  |  0.92    Ca    9.1      31 Jul 2020 06:52

## 2020-07-31 NOTE — PROGRESS NOTE ADULT - NS ED BHA TELEPSYCH PATIENT LOCATION
[facility]

## 2020-07-31 NOTE — PROGRESS NOTE ADULT - ASSESSMENT
76 y/o M with PMHx BPH, HTN, and HLD presented to ED with intermittent left upper extremity spasms and weakness since the first week of June. His symptoms progressed to his right upper and lower extremity in late June with weakness, spasms and shaking. The weakness led to difficulty holding objects. He also had multiple falls. Outpatient MRI brain without contrast demonstrated hyperenhancement of right limbic lobe. 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  Autoimmune/paraneoplastic panels pending.     s/p PLEX #4 on 7/29, tolerated well.    PLEX #5 of 5 today, 1 plasma volume with 5% albumin as replacement solution.

## 2020-07-31 NOTE — PROGRESS NOTE ADULT - SUBJECTIVE AND OBJECTIVE BOX
no new complaints     REVIEW OF SYSTEMS  Constitutional - No fever,  No fatigue  HEENT - No vertigo, No neck pain  Neurological - No headaches, No memory loss, No loss of strength, No numbness, No tremors  Skin - No rashes, No lesions   Musculoskeletal - No joint pain, No joint swelling, No muscle pain  Psychiatric - No depression, No anxiety    FUNCTIONAL PROGRESS  7/30 PT    Bed Mobility  Bed Mobility Training Scooting: contact guard;  1 person assist;  nonverbal cues (demo/gestures);  verbal cues  Bed Mobility Training Supine-to-Sit: contact guard;  1 person assist;  nonverbal cues (demo/gestures);  verbal cues  Bed Mobility Training Limitations: decreased strength;  impaired balance;  cognitive, decreased safety awareness    Sit-Stand Transfer Training  Transfer Training Sit-to-Stand Transfer: contact guard;  1 person assist;  nonverbal cues (demo/gestures);  verbal cues;  full weight-bearing   rolling walker;  prog to sup  Transfer Training Stand-to-Sit Transfer: contact guard;  1 person assist;  verbal cues;  nonverbal cues (demo/gestures);  full weight-bearing   rolling walker;  prog to sup  Sit-to-Stand Transfer Training Transfer Safety Analysis: decreased balance;  decreased sequencing ability;  cognitive, decreased safety awareness;  impaired balance;  decreased strength    Gait Training  Gait Training: contact guard;  1 person assist;  nonverbal cues (demo/gestures);  verbal cues;  full weight-bearing   rolling walker;  50 feet;  pt amb an additional 50ft with HHA/CGA  Gait Analysis: 2-point gait   decreased step length;  decreased stride length;  decreased strength;  impaired balance;  cognitive, decreased safety awareness;  50 feet;  rolling walker;  pt amb an additional 50ft with HHA/CGA    Therapeutic Exercise  Therapeutic Exercise Detail: Pt clarita standing dynamic reaching out of ED 1x10. LOBx2 with assist to correct. Pt clarita standing marches and heel raises.     7/29 OT    OT Cognitive Treatment  OT Cognitive Treatment Treatment Detail: Pt scored 2/28 on short blessed test indicating normal cognition however demonstrated poor safety awareness when prompting with questions. Pt utilized self-verbalization of directions for short blessed test, and demonstrated increase in time processing to say months of year in backwards order.  Pt unable to recall number for police or what to do in the event of a fire, stating, “I will hang low”. Pt educated to use call bell to call nurse if he needs help or to go to the bathroom. Pt demonstrates decrease in safety awareness and requires supervision for ADLs and functional mobility.       VITALS  T(C): 37.1 (07-31-20 @ 07:43), Max: 37.2 (07-30-20 @ 23:29)  HR: 56 (07-31-20 @ 07:43) (56 - 79)  BP: 119/75 (07-31-20 @ 07:43) (108/69 - 146/82)  RR: 20 (07-31-20 @ 07:43) (17 - 20)  SpO2: 97% (07-31-20 @ 07:43) (96% - 97%)  Wt(kg): --    MEDICATIONS   acetaminophen   Tablet .. 650 milliGRAM(s) once  chlorhexidine 4% Liquid 1 Application(s) <User Schedule>  diphenhydrAMINE   Injectable 50 milliGRAM(s) once  enoxaparin Injectable 40 milliGRAM(s) daily  finasteride 5 milliGRAM(s) daily  hydrocortisone sodium succinate Injectable 100 milliGRAM(s) once  hydrocortisone sodium succinate Injectable 100 milliGRAM(s) once PRN  lidocaine 1% Injectable 20 milliLiter(s) once  LORazepam   Injectable 1 milliGRAM(s) once PRN  polyethylene glycol 3350 17 Gram(s) daily PRN  riTUXimab IVPB (Non - oncologic) 1000 milliGRAM(s) once  simvastatin 10 milliGRAM(s) at bedtime  sodium chloride 0.9% lock flush 10 milliLiter(s) every 1 hour PRN  valproic acid 500 milliGRAM(s) at bedtime  valproic acid 250 milliGRAM(s) <User Schedule>      RECENT LABS - Reviewed                        12.6   9.23  )-----------( 175      ( 31 Jul 2020 06:52 )             37.7     07-31    135  |  97  |  14  ----------------------------<  96  4.4   |  25  |  0.92    Ca    9.1      31 Jul 2020 06:52    ------------------------------------------------------------------------------  PHYSICAL EXAM  Constitutional - NAD, Comfortable, in bed   HEENT - NCAT, EOMI  Neck - Supple, No limited ROM  Chest - Breathing comfortably, No wheezing  Cardiovascular - S1S2   Abdomen - Soft   Extremities - No C/C/E, No calf tenderness   Neurologic Exam -                    Cognitive - Awake, Alert, AAO to self, place, date, year, situation     Communication - Fluent, No dysarthria     Cranial Nerves - CN 2-12 intact     Motor -                     LEFT    UE - ShAB 5/5, EF 5/5, EE 5/5, WE 5/5,  5/5                    RIGHT UE - ShAB 5/5, EF 5/5, EE 5/5, WE 5/5,  5/5                    LEFT    LE - HF 5/5, KE 5/5, DF 5/5, PF 5/5                    RIGHT LE - HF 5/5, KE 5/5, DF 5/5, PF 5/5        Sensory - Intact to LT       Balance - WNL Static.   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   Encephalitis - s/p PLEX.  c/w VPA for sz control. to have Rituxan x 1 treatment on 8/2  Pain - Tylenol PRN  BPH - c/w finasteride  HLD - c/w statin  PT/OT - daily therapies for ROM, increased mobility, and decreased debility related to hospitalization.  Balance, gait training, ADLs, transfers, and bracing/assistive devices as needed.    DVT PPX - lovenox  Rehab - Recommend ACUTE inpatient rehabilitation for the functional and cognitive deficits consisting of 3 hours of therapy/day & 24 hour RN/daily PMR physician for comorbid medical management. Will continue to follow for ongoing rehab needs and recommendations. Patient will be able to tolerate 3 hours a day.  Discussed recommendation with patient, who is amenable to rehab.

## 2020-07-31 NOTE — PROGRESS NOTE ADULT - NS ED BHA TELEPSYCH PROVIDER LOCATION
[facility]
HOME
[facility]

## 2020-07-31 NOTE — PROGRESS NOTE ADULT - ATTENDING COMMENTS
his LG1 ab returned positive at high titers.  today PLEX 5/5    he will require Rituxan 1000 mg x2, 2 weeks apart  hep B, C and quatiferon  sent today    he will be dc to acute rahab maybe Monday/Tuesday  he will continue Valproic acid 250-500  will need IVIG monthly as outpatient his LG1 ab returned positive at high titers.  today PLEX 5/5    he will require Rituxan 1000 mg x1  hep B, C and quatiferon  sent today    he will be dc to acute rahab maybe Monday/Tuesday  he will continue Valproic acid 250-500  will need IVIG monthly as outpatient

## 2020-07-31 NOTE — PROGRESS NOTE ADULT - ASSESSMENT
Assessment and Plan:   		  74yo man with PMHx BPH, HTN, and HLD presents to ED with intermittent left upper extremity tonic clonic activity with progression to right upper and lower extremities and multiple falls since June, concerning for seizures. Outpatient MRI brain without contrast demonstrates hyperenhancement of right limbic lobe.     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 Differentials also includes autoimmune encephalitis paraneoplastic encephalitis vs. other inflammatory encephalitis vs. infectious encephalitis vs. less likely focal dystonia.    Plan:  [X] Discontinue vEEG  [X] PLEX 5/5 treatments completed, last dose 7/31/20  [] Stop Valproic Acid 250 mg TID, Start Valproic Acid 250 mg AM, and Valproic Acid 500 mg PM dose   [] Start Rituxan one time treatment 7/31/20   [] OT Re-evaluation prior to discharge    Attending Attestation:   Rituxan 1000 Friday after last PLEX  IVIG as outpatient  repeat Rituxan in 2 weeks  whole body PET as outpatient in 6 months .     I was physically present for the key portions of the evaluation and management (E/M) service provided.  I agree with the above history, physical, and plan which I have reviewed and edited where appropriate.     40 minutes spent on total encounter; more than 50% of the visit was spent counseling and/or coordinating care by the attending physician.     Plan discussed with neurology team. Assessment and Plan:   		  76yo man with PMHx BPH, HTN, and HLD presents to ED with intermittent left upper extremity tonic clonic activity with progression to right upper and lower extremities and multiple falls since June, concerning for seizures. Outpatient MRI brain without contrast demonstrates hyperenhancement of right limbic lobe.     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 Differentials also includes autoimmune encephalitis paraneoplastic encephalitis vs. other inflammatory encephalitis vs. infectious encephalitis vs. less likely focal dystonia.    Plan:  [X] Discontinue vEEG  [X] PLEX 5/5 treatments completed, last dose 7/31/20  [] Stop Valproic Acid 250 mg TID, Start Valproic Acid 250 mg AM, and Valproic Acid 500 mg PM dose   [] Order TB QuantiFeron hepatitis B and hepatitis C screening - awaiting results on 8/1/20  [] Start Rituxan one time treatment 8/2/20 - Contact Vishal Zimmerman Nurse, Ext [phone]  [] OT Re-evaluation prior to discharge    Attending Attestation:   Rituxan 1000 Sunday after last PLEX  IVIG as outpatient  repeat Rituxan in 2 weeks  whole body PET as outpatient in 6 months .     I was physically present for the key portions of the evaluation and management (E/M) service provided.  I agree with the above history, physical, and plan which I have reviewed and edited where appropriate.     40 minutes spent on total encounter; more than 50% of the visit was spent counseling and/or coordinating care by the attending physician.     Plan discussed with neurology team.

## 2020-07-31 NOTE — PROGRESS NOTE ADULT - SUBJECTIVE AND OBJECTIVE BOX
Progress Note:     Subjective: Patient stated that he is feeling well this morning. Patient denied arm spasms last night and stated he had to go to the bathroom frequently last night, but that this is not above his baseline. Patient denied any current pain or discomfort.      MEDICATIONS  (STANDING):  chlorhexidine 4% Liquid 1 Application(s) Topical <User Schedule>  cyanocobalamin Injectable 1000 MICROGram(s) IntraMuscular daily  enoxaparin Injectable 40 milliGRAM(s) SubCutaneous daily  finasteride 5 milliGRAM(s) Oral daily  lidocaine 1% Injectable 20 milliLiter(s) Local Injection once  simvastatin 10 milliGRAM(s) Oral at bedtime  valproic acid 250 milliGRAM(s) Oral three times a day    MEDICATIONS  (PRN):  LORazepam   Injectable 1 milliGRAM(s) IV Push once PRN for gtc seizure > 2 minutes  polyethylene glycol 3350 17 Gram(s) Oral daily PRN Constipation  sodium chloride 0.9% lock flush 10 milliLiter(s) IV Push every 1 hour PRN Pre/post blood products, medications, blood draw, and to maintain line patency    Vital Signs Last 24 Hrs  T(C): 37.1 (31 Jul 2020 07:43), Max: 37.2 (30 Jul 2020 23:29)  T(F): 98.8 (31 Jul 2020 07:43), Max: 99 (30 Jul 2020 23:29)  HR: 56 (31 Jul 2020 07:43) (56 - 79)  BP: 119/75 (31 Jul 2020 07:43) (108/69 - 146/82)  BP(mean): --  RR: 20 (31 Jul 2020 07:43) (17 - 20)  SpO2: 97% (31 Jul 2020 07:43) (96% - 97%)    PHYSICAL EXAMINATION:  General: Well-developed, well nourished, in no acute distress.    Neurologic:  - Mental Status:  Alert, awake, oriented to person, place, and time; Speech is fluent with intact comprehension.  - Cranial Nerves II-XII: PERRL, EOMI, Facial sensation is intact in the V1-V3 distribution bilaterally. No facial palsy  - Motor:  Strength is 5/5 throughout.  There is no pronator drift.  Normal muscle bulk and tone throughout.  - Sensory:  Intact throughout to LT.  - Gait: deferred    Labs:                                  12.6   9.23  )-----------( 175      ( 31 Jul 2020 06:52 )             37.7     07-31    135  |  97  |  14  ----------------------------<  96  4.4   |  25  |  0.92    Ca    9.1      31 Jul 2020 06:52

## 2020-08-01 LAB
HBV CORE IGM SER-ACNC: SIGNIFICANT CHANGE UP
HBV SURFACE AB SER-ACNC: SIGNIFICANT CHANGE UP
HBV SURFACE AG SER-ACNC: SIGNIFICANT CHANGE UP
HCV AB S/CO SERPL IA: 0.02 S/CO — SIGNIFICANT CHANGE UP (ref 0–0.99)
HCV AB SERPL-IMP: SIGNIFICANT CHANGE UP

## 2020-08-01 PROCEDURE — 99232 SBSQ HOSP IP/OBS MODERATE 35: CPT | Mod: GC

## 2020-08-01 RX ADMIN — CHLORHEXIDINE GLUCONATE 1 APPLICATION(S): 213 SOLUTION TOPICAL at 05:53

## 2020-08-01 RX ADMIN — Medication 250 MILLIGRAM(S): at 05:53

## 2020-08-01 RX ADMIN — SIMVASTATIN 10 MILLIGRAM(S): 20 TABLET, FILM COATED ORAL at 22:33

## 2020-08-01 RX ADMIN — ENOXAPARIN SODIUM 40 MILLIGRAM(S): 100 INJECTION SUBCUTANEOUS at 11:38

## 2020-08-01 RX ADMIN — Medication 500 MILLIGRAM(S): at 22:33

## 2020-08-01 RX ADMIN — FINASTERIDE 5 MILLIGRAM(S): 5 TABLET, FILM COATED ORAL at 11:38

## 2020-08-01 NOTE — PROGRESS NOTE ADULT - SUBJECTIVE AND OBJECTIVE BOX
Patient is a 75y old  Male who presents with a chief complaint of limbic encephalitis (01 Aug 2020 10:52)      INTERVAL HPI/OVERNIGHT EVENTS: noted  pt seen and examined      Vital Signs Last 24 Hrs  T(C): 36.4 (01 Aug 2020 16:19), Max: 37 (01 Aug 2020 04:45)  T(F): 97.6 (01 Aug 2020 16:19), Max: 98.6 (01 Aug 2020 04:45)  HR: 82 (01 Aug 2020 16:19) (59 - 87)  BP: 123/70 (01 Aug 2020 16:19) (106/66 - 140/81)  BP(mean): --  RR: 18 (01 Aug 2020 16:19) (18 - 20)  SpO2: 98% (01 Aug 2020 16:19) (96% - 100%)    acetaminophen   Tablet .. 650 milliGRAM(s) Oral once  chlorhexidine 4% Liquid 1 Application(s) Topical <User Schedule>  diphenhydrAMINE   Injectable 50 milliGRAM(s) IV Push once  enoxaparin Injectable 40 milliGRAM(s) SubCutaneous daily  finasteride 5 milliGRAM(s) Oral daily  hydrocortisone sodium succinate Injectable 100 milliGRAM(s) IV Push once  hydrocortisone sodium succinate Injectable 100 milliGRAM(s) IV Push once PRN  lidocaine 1% Injectable 20 milliLiter(s) Local Injection once  LORazepam   Injectable 1 milliGRAM(s) IV Push once PRN  polyethylene glycol 3350 17 Gram(s) Oral daily PRN  riTUXimab IVPB (Non - oncologic) 1000 milliGRAM(s) IV Intermittent once  simvastatin 10 milliGRAM(s) Oral at bedtime  sodium chloride 0.9% lock flush 10 milliLiter(s) IV Push every 1 hour PRN  valproic acid 500 milliGRAM(s) Oral at bedtime  valproic acid 250 milliGRAM(s) Oral <User Schedule>      PHYSICAL EXAM:  GENERAL: NAD,   EYES: conjunctiva and sclera clear  ENMT: Moist mucous membranes  NECK: Supple, No JVD, Normal thyroid  CHEST/LUNG: non labored, cta b/l  HEART: Regular rate and rhythm; No murmurs, rubs, or gallops  ABDOMEN: Soft, Nontender, Nondistended; Bowel sounds present  EXTREMITIES:  2+ Peripheral Pulses, No clubbing, cyanosis, or edema  LYMPH: No lymphadenopathy noted  SKIN: No rashes or lesions    Consultant(s) Notes Reviewed:  [x ] YES  [ ] NO  Care Discussed with Consultants/Other Providers [ x] YES  [ ] NO    LABS:                        12.6   9.23  )-----------( 175      ( 31 Jul 2020 06:52 )             37.7     07-31    135  |  97  |  14  ----------------------------<  96  4.4   |  25  |  0.92    Ca    9.1      31 Jul 2020 06:52          CAPILLARY BLOOD GLUCOSE                  RADIOLOGY & ADDITIONAL TESTS:    Imaging Personally Reviewed:  [x ] YES  [ ] NO

## 2020-08-01 NOTE — PROGRESS NOTE ADULT - SUBJECTIVE AND OBJECTIVE BOX
Subjective: Patient seen and examined. No new events except as noted.     REVIEW OF SYSTEMS:    CONSTITUTIONAL: + weakness, fevers or chills  EYES/ENT: No visual changes;  No vertigo or throat pain   NECK: No pain or stiffness  RESPIRATORY: No cough, wheezing, hemoptysis; No shortness of breath  CARDIOVASCULAR: No chest pain or palpitations  GASTROINTESTINAL: No abdominal or epigastric pain. No nausea, vomiting, or hematemesis; No diarrhea or constipation. No melena or hematochezia.  GENITOURINARY: No dysuria, frequency or hematuria  NEUROLOGICAL: No numbness or weakness  SKIN: No itching, burning, rashes, or lesions   All other review of systems is negative unless indicated above.    MEDICATIONS:  MEDICATIONS  (STANDING):  acetaminophen   Tablet .. 650 milliGRAM(s) Oral once  chlorhexidine 4% Liquid 1 Application(s) Topical <User Schedule>  diphenhydrAMINE   Injectable 50 milliGRAM(s) IV Push once  enoxaparin Injectable 40 milliGRAM(s) SubCutaneous daily  finasteride 5 milliGRAM(s) Oral daily  hydrocortisone sodium succinate Injectable 100 milliGRAM(s) IV Push once  lidocaine 1% Injectable 20 milliLiter(s) Local Injection once  riTUXimab IVPB (Non - oncologic) 1000 milliGRAM(s) IV Intermittent once  simvastatin 10 milliGRAM(s) Oral at bedtime  valproic acid 500 milliGRAM(s) Oral at bedtime  valproic acid 250 milliGRAM(s) Oral <User Schedule>      PHYSICAL EXAM:  T(C): 36.7 (08-01-20 @ 20:25), Max: 37 (08-01-20 @ 04:45)  HR: 83 (08-01-20 @ 20:25) (59 - 83)  BP: 151/92 (08-01-20 @ 20:25) (106/66 - 151/92)  RR: 18 (08-01-20 @ 20:25) (18 - 20)  SpO2: 96% (08-01-20 @ 20:25) (96% - 100%)  Wt(kg): --  I&O's Summary    31 Jul 2020 07:01  -  01 Aug 2020 07:00  --------------------------------------------------------  IN: 720 mL / OUT: 325 mL / NET: 395 mL    01 Aug 2020 07:01  -  01 Aug 2020 22:18  --------------------------------------------------------  IN: 600 mL / OUT: 0 mL / NET: 600 mL          Appearance: Normal	  HEENT:   Normal oral mucosa, PERRL, EOMI	  Lymphatic: No lymphadenopathy , no edema  Cardiovascular: Normal S1 S2, No JVD, No murmurs , Peripheral pulses palpable 2+ bilaterally  Respiratory: Lungs clear to auscultation, normal effort 	  Gastrointestinal:  Soft, Non-tender, + BS	  Skin: No rashes, No ecchymoses, No cyanosis, warm to touch  Musculoskeletal: Normal range of motion, normal strength  Psychiatry:  Mood & affect appropriate  Ext: No edema      LABS:    CARDIAC MARKERS:                                12.6   9.23  )-----------( 175      ( 31 Jul 2020 06:52 )             37.7     07-31    135  |  97  |  14  ----------------------------<  96  4.4   |  25  |  0.92    Ca    9.1      31 Jul 2020 06:52      proBNP:   Lipid Profile:   HgA1c:   TSH:             TELEMETRY: 	  SR  ECG:  	  RADIOLOGY:   DIAGNOSTIC TESTING:  [ ] Echocardiogram:  [ ]  Catheterization:  [ ] Stress Test:    OTHER:

## 2020-08-01 NOTE — PROGRESS NOTE ADULT - ASSESSMENT
74yo man with PMHx BPH, HTN, and HLD presents to ED with intermittent left upper extremity tonic clonic activity with progression to right upper and lower extremities and multiple falls since June, concerning for seizures. Outpatient MRI brain without contrast demonstrates hyperenhancement of right limbic lobe.     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 RNA PCR pending. If back by tomorrow, can start Rituxan, otherwise will have to wait untill Monday.   [] Start Rituxan one time treatment 8/2/20 (if labs are back) - Contact Elsie Chemo Nurse, Ext [phone]  [X] Discontinue vEEG  [X] PLEX 5/5 treatments completed, last dose 7/31/20  [] Continue Valproic Acid 250 mg AM, and Valproic Acid 500 mg PM dose   [] OT Re-evaluation prior to discharge

## 2020-08-01 NOTE — PROGRESS NOTE ADULT - ATTENDING COMMENTS
his LG1 ab returned positive at high titers.  s/p 5 cycles PLEX awaiting Rituxan     hep B, C and quatiferon  sent Friday, awaiting results before Rituxan.     he will be dc to acute rahab maybe Monday/Tuesday  he will continue Valproic acid 250-500  will need IVIG monthly as outpatient

## 2020-08-01 NOTE — PROGRESS NOTE ADULT - SUBJECTIVE AND OBJECTIVE BOX
DENA FARNSWORTH  Male  MRN-[id number]    Subjective:    -No acute events overnight. No complaints. Denies any arm spasms today.   -Last PLEX yesterday    VITAL SIGNS:  T(F): 98.2  HR: 74  BP: 125/73  RR: 20  SpO2: 100%    Exam:   MS: Oriented x3. Fluent. Follows crossed commands.   CN: VFF. EOMI. V1-V3 intact. Face symmetric. T/u midline.   Motor: Full strength throughout.   Sensory: Intact to LT throughout   Reflexes: 2+ throughout. Babinski absent bilaterally.   Coordination: No dysmetria on FNF or ataxia on HTS bilaterally   Gait: Deferred    LABS:                          12.6   9.23  )-----------( 175      ( 31 Jul 2020 06:52 )             37.7     07-31    135  |  97  |  14  ----------------------------<  96  4.4   |  25  |  0.92    Ca    9.1      31 Jul 2020 06:52    RADIOLOGY & ADDITIONAL STUDIES:

## 2020-08-01 NOTE — PROGRESS NOTE ADULT - ASSESSMENT
74 yo male PMHx BPH, HTN, and HLD presents to ED with intermittent left upper extremity tonic clonic activity with progression to right upper and lower extremities and multiple falls since June, concerning for seizures. Outpatient MRI brain without contrast demonstrates hyperenhancement of right limbic lobe.     # Bilateral upper extremity tonic clonic activity   # autoimmune encephalitis  # HTN  # HLD  # BPH  # lung nodules and tree in bud opacities    Appreciate neurology care  Follow up serum autoimmune workup  cont proscar  cont statin  holding amlodipine  cont PLEX per neuro 4/5, last dose 7/31  cont depacon   Pending QuantiFeron plus TB; HBV PCR; HCV pending. RNA PCR pending.prior to vstarting Rituxan, .     PCP Dr. Tevin Ovalle  [facility]  [phone]

## 2020-08-02 LAB
GAMMA INTERFERON BACKGROUND BLD IA-ACNC: 0.02 IU/ML — SIGNIFICANT CHANGE UP
M TB IFN-G BLD-IMP: NEGATIVE — SIGNIFICANT CHANGE UP
M TB IFN-G CD4+ BCKGRND COR BLD-ACNC: 0 IU/ML — SIGNIFICANT CHANGE UP
M TB IFN-G CD4+CD8+ BCKGRND COR BLD-ACNC: 0 IU/ML — SIGNIFICANT CHANGE UP
QUANT TB PLUS MITOGEN MINUS NIL: 8.44 IU/ML — SIGNIFICANT CHANGE UP

## 2020-08-02 PROCEDURE — 99232 SBSQ HOSP IP/OBS MODERATE 35: CPT | Mod: GC

## 2020-08-02 RX ADMIN — SIMVASTATIN 10 MILLIGRAM(S): 20 TABLET, FILM COATED ORAL at 21:23

## 2020-08-02 RX ADMIN — Medication 500 MILLIGRAM(S): at 21:23

## 2020-08-02 RX ADMIN — ENOXAPARIN SODIUM 40 MILLIGRAM(S): 100 INJECTION SUBCUTANEOUS at 11:01

## 2020-08-02 RX ADMIN — CHLORHEXIDINE GLUCONATE 1 APPLICATION(S): 213 SOLUTION TOPICAL at 05:09

## 2020-08-02 RX ADMIN — Medication 250 MILLIGRAM(S): at 05:09

## 2020-08-02 RX ADMIN — FINASTERIDE 5 MILLIGRAM(S): 5 TABLET, FILM COATED ORAL at 11:02

## 2020-08-02 NOTE — ADVANCED PRACTICE NURSE CONSULT - ASSESSMENT
Spoke with SCOTTY Lofton Neuro Resident @ [phone] RE; rituxan scheduled for 8/2-1st time non-onc-pt still pending lab results[Hep-B PCR and Hep-C RNA-sent 7/31] checked again with her @ 1330pm-results remain pending-treatment held today as per neuro fellow-attending aware with rounds. If results back in am will resume to treat pt .

## 2020-08-02 NOTE — PROGRESS NOTE ADULT - SUBJECTIVE AND OBJECTIVE BOX
SUBJECTIVE:     Vital Signs Last 24 Hrs  T(C): 36.3 (02 Aug 2020 05:06), Max: 36.8 (01 Aug 2020 09:41)  T(F): 97.4 (02 Aug 2020 05:06), Max: 98.2 (01 Aug 2020 09:41)  HR: 58 (02 Aug 2020 05:06) (51 - 83)  BP: 139/85 (02 Aug 2020 05:06) (123/70 - 151/92)  BP(mean): --  RR: 18 (02 Aug 2020 05:06) (18 - 20)  SpO2: 97% (02 Aug 2020 05:06) (96% - 100%)      Exam:   MS: Oriented x3. Fluent. Follows crossed commands.   CN: VFF. EOMI. V1-V3 intact. Face symmetric. T/u midline.   Motor: Full strength throughout.   Sensory: Intact to LT throughout   Reflexes: 2+ throughout. Babinski absent bilaterally.   Coordination: No dysmetria on FNF or ataxia on HTS bilaterally   Gait: Deferred     U/A   MEDICATIONS  (STANDING):  acetaminophen   Tablet .. 650 milliGRAM(s) Oral once  chlorhexidine 4% Liquid 1 Application(s) Topical <User Schedule>  diphenhydrAMINE   Injectable 50 milliGRAM(s) IV Push once  enoxaparin Injectable 40 milliGRAM(s) SubCutaneous daily  finasteride 5 milliGRAM(s) Oral daily  hydrocortisone sodium succinate Injectable 100 milliGRAM(s) IV Push once  lidocaine 1% Injectable 20 milliLiter(s) Local Injection once  riTUXimab IVPB (Non - oncologic) 1000 milliGRAM(s) IV Intermittent once  simvastatin 10 milliGRAM(s) Oral at bedtime  valproic acid 500 milliGRAM(s) Oral at bedtime  valproic acid 250 milliGRAM(s) Oral <User Schedule>    MEDICATIONS  (PRN):  hydrocortisone sodium succinate Injectable 100 milliGRAM(s) IV Push once PRN Infusion reaction  LORazepam   Injectable 1 milliGRAM(s) IV Push once PRN for gtc seizure > 2 minutes  polyethylene glycol 3350 17 Gram(s) Oral daily PRN Constipation  sodium chloride 0.9% lock flush 10 milliLiter(s) IV Push every 1 hour PRN Pre/post blood products, medications, blood draw, and to maintain line patency SUBJECTIVE: No overnight events. Has 6-7 LUE spasms per day, drastically decreased from near continous spasms that he was having prior to admission.   Pending Rituxan, awaiting labs    Vital Signs Last 24 Hrs  T(C): 36.3 (02 Aug 2020 05:06), Max: 36.8 (01 Aug 2020 09:41)  T(F): 97.4 (02 Aug 2020 05:06), Max: 98.2 (01 Aug 2020 09:41)  HR: 58 (02 Aug 2020 05:06) (51 - 83)  BP: 139/85 (02 Aug 2020 05:06) (123/70 - 151/92)  BP(mean): --  RR: 18 (02 Aug 2020 05:06) (18 - 20)  SpO2: 97% (02 Aug 2020 05:06) (96% - 100%)      Exam:   MS: Oriented x3. Fluent. Follows crossed commands.   CN: VFF. EOMI. V1-V3 intact. Face symmetric. T/u midline.   Motor: Full strength throughout.   Sensory: Intact to LT throughout   Reflexes: 2+ throughout. Babinski absent bilaterally.   Coordination: No dysmetria on FNF or ataxia on HTS bilaterally   Gait: Deferred     U/A   MEDICATIONS  (STANDING):  acetaminophen   Tablet .. 650 milliGRAM(s) Oral once  chlorhexidine 4% Liquid 1 Application(s) Topical <User Schedule>  diphenhydrAMINE   Injectable 50 milliGRAM(s) IV Push once  enoxaparin Injectable 40 milliGRAM(s) SubCutaneous daily  finasteride 5 milliGRAM(s) Oral daily  hydrocortisone sodium succinate Injectable 100 milliGRAM(s) IV Push once  lidocaine 1% Injectable 20 milliLiter(s) Local Injection once  riTUXimab IVPB (Non - oncologic) 1000 milliGRAM(s) IV Intermittent once  simvastatin 10 milliGRAM(s) Oral at bedtime  valproic acid 500 milliGRAM(s) Oral at bedtime  valproic acid 250 milliGRAM(s) Oral <User Schedule>    MEDICATIONS  (PRN):  hydrocortisone sodium succinate Injectable 100 milliGRAM(s) IV Push once PRN Infusion reaction  LORazepam   Injectable 1 milliGRAM(s) IV Push once PRN for gtc seizure > 2 minutes  polyethylene glycol 3350 17 Gram(s) Oral daily PRN Constipation  sodium chloride 0.9% lock flush 10 milliLiter(s) IV Push every 1 hour PRN Pre/post blood products, medications, blood draw, and to maintain line patency

## 2020-08-02 NOTE — PROGRESS NOTE ADULT - ASSESSMENT
76yo man with PMHx BPH, HTN, and HLD presents to ED with intermittent left upper extremity tonic clonic activity with progression to right upper and lower extremities and multiple falls since June, concerning for seizures. Outpatient MRI brain without contrast demonstrates hyperenhancement of right limbic lobe.     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 Quat TB neg  [X] Discontinue vEEG  [X] PLEX 5/5 treatments completed, last dose 7/31/20  [x] Continue Valproic Acid 250 mg AM, and Valproic Acid 500 mg PM dose   [] OT Re-evaluation prior to discharge 74yo man with PMHx BPH, HTN, and HLD presents to ED with intermittent left upper extremity tonic clonic activity with progression to right upper and lower extremities and multiple falls since June, concerning for seizures. Outpatient MRI brain w/o contrast demonstrates hyperenhancement of right limbic lobe.     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 Quat TB neg, Hep B and C Ab negative.   [X] Discontinue vEEG  [X] PLEX 5/5 treatments completed, last dose 7/31/20  [x] Continue Valproic Acid 250 mg AM, and Valproic Acid 500 mg PM dose   [] OT Re-evaluation prior to discharge    Case discussed with Dr. Jose

## 2020-08-02 NOTE — PROGRESS NOTE ADULT - SUBJECTIVE AND OBJECTIVE BOX
Subjective: Patient seen and examined. No new events except as noted.     REVIEW OF SYSTEMS:    CONSTITUTIONAL: No weakness, fevers or chills  EYES/ENT: No visual changes;  No vertigo or throat pain   NECK: No pain or stiffness  RESPIRATORY: No cough, wheezing, hemoptysis; No shortness of breath  CARDIOVASCULAR: No chest pain or palpitations  GASTROINTESTINAL: No abdominal or epigastric pain. No nausea, vomiting, or hematemesis; No diarrhea or constipation. No melena or hematochezia.  GENITOURINARY: No dysuria, frequency or hematuria  NEUROLOGICAL: No numbness or weakness  SKIN: No itching, burning, rashes, or lesions   All other review of systems is negative unless indicated above.    MEDICATIONS:  MEDICATIONS  (STANDING):  acetaminophen   Tablet .. 650 milliGRAM(s) Oral once  chlorhexidine 4% Liquid 1 Application(s) Topical <User Schedule>  diphenhydrAMINE   Injectable 50 milliGRAM(s) IV Push once  enoxaparin Injectable 40 milliGRAM(s) SubCutaneous daily  finasteride 5 milliGRAM(s) Oral daily  hydrocortisone sodium succinate Injectable 100 milliGRAM(s) IV Push once  lidocaine 1% Injectable 20 milliLiter(s) Local Injection once  riTUXimab IVPB (Non - oncologic) 1000 milliGRAM(s) IV Intermittent once  simvastatin 10 milliGRAM(s) Oral at bedtime  valproic acid 500 milliGRAM(s) Oral at bedtime  valproic acid 250 milliGRAM(s) Oral <User Schedule>      PHYSICAL EXAM:  T(C): 37 (08-02-20 @ 08:34), Max: 37 (08-02-20 @ 08:34)  HR: 60 (08-02-20 @ 08:34) (51 - 83)  BP: 149/71 (08-02-20 @ 08:34) (123/70 - 151/92)  RR: 18 (08-02-20 @ 08:34) (18 - 18)  SpO2: 96% (08-02-20 @ 08:34) (96% - 98%)  Wt(kg): --  I&O's Summary    01 Aug 2020 07:01  -  02 Aug 2020 07:00  --------------------------------------------------------  IN: 960 mL / OUT: 0 mL / NET: 960 mL          Appearance: Normal	  HEENT:   Normal oral mucosa, PERRL, EOMI	  Lymphatic: No lymphadenopathy , no edema  Cardiovascular: Normal S1 S2, No JVD, No murmurs , Peripheral pulses palpable 2+ bilaterally  Respiratory: Lungs clear to auscultation, normal effort 	  Gastrointestinal:  Soft, Non-tender, + BS	  Skin: No rashes, No ecchymoses, No cyanosis, warm to touch  Musculoskeletal: Normal range of motion, normal strength  Psychiatry:  Mood & affect appropriate  Ext: No edema      LABS:    CARDIAC MARKERS:                  proBNP:   Lipid Profile:   HgA1c:   TSH:             TELEMETRY: 	    ECG:  	  RADIOLOGY:   DIAGNOSTIC TESTING:  [ ] Echocardiogram:  [ ]  Catheterization:  [ ] Stress Test:    OTHER:

## 2020-08-02 NOTE — PROGRESS NOTE ADULT - ATTENDING COMMENTS
his LG1 ab returned positive at high titers. Patient had FBDS in my presence, and says he gets them approx 6x / day now - usually at night - and that this frequency is much improved compared to prior to PLEX.   s/p 5 cycles PLEX awaiting Rituxan     hep B, C and quatiferon sent Friday, so all results negative, except PCR which are still pending. Awaiting results before Rituxan.     he will be dc to acute rahab maybe Monday/Tuesday  he will continue Valproic acid 250-500  will need IVIG monthly as outpatient

## 2020-08-03 LAB
ANION GAP SERPL CALC-SCNC: 13 MMOL/L — SIGNIFICANT CHANGE UP (ref 5–17)
BUN SERPL-MCNC: 17 MG/DL — SIGNIFICANT CHANGE UP (ref 7–23)
CALCIUM SERPL-MCNC: 9.1 MG/DL — SIGNIFICANT CHANGE UP (ref 8.4–10.5)
CHLORIDE SERPL-SCNC: 96 MMOL/L — SIGNIFICANT CHANGE UP (ref 96–108)
CO2 SERPL-SCNC: 25 MMOL/L — SIGNIFICANT CHANGE UP (ref 22–31)
CREAT SERPL-MCNC: 0.9 MG/DL — SIGNIFICANT CHANGE UP (ref 0.5–1.3)
GLUCOSE SERPL-MCNC: 92 MG/DL — SIGNIFICANT CHANGE UP (ref 70–99)
HBV DNA # SERPL NAA+PROBE: SIGNIFICANT CHANGE UP IU/ML
HBV DNA SERPL NAA+PROBE-LOG#: SIGNIFICANT CHANGE UP LOG10IU/ML
HCT VFR BLD CALC: 38.5 % — LOW (ref 39–50)
HGB BLD-MCNC: 12.6 G/DL — LOW (ref 13–17)
MCHC RBC-ENTMCNC: 29.2 PG — SIGNIFICANT CHANGE UP (ref 27–34)
MCHC RBC-ENTMCNC: 32.7 GM/DL — SIGNIFICANT CHANGE UP (ref 32–36)
MCV RBC AUTO: 89.3 FL — SIGNIFICANT CHANGE UP (ref 80–100)
NRBC # BLD: 0 /100 WBCS — SIGNIFICANT CHANGE UP (ref 0–0)
PLATELET # BLD AUTO: 200 K/UL — SIGNIFICANT CHANGE UP (ref 150–400)
POTASSIUM SERPL-MCNC: 4.5 MMOL/L — SIGNIFICANT CHANGE UP (ref 3.5–5.3)
POTASSIUM SERPL-SCNC: 4.5 MMOL/L — SIGNIFICANT CHANGE UP (ref 3.5–5.3)
RBC # BLD: 4.31 M/UL — SIGNIFICANT CHANGE UP (ref 4.2–5.8)
RBC # FLD: 13.9 % — SIGNIFICANT CHANGE UP (ref 10.3–14.5)
SODIUM SERPL-SCNC: 134 MMOL/L — LOW (ref 135–145)
WBC # BLD: 7.86 K/UL — SIGNIFICANT CHANGE UP (ref 3.8–10.5)
WBC # FLD AUTO: 7.86 K/UL — SIGNIFICANT CHANGE UP (ref 3.8–10.5)

## 2020-08-03 PROCEDURE — 99232 SBSQ HOSP IP/OBS MODERATE 35: CPT

## 2020-08-03 PROCEDURE — 99231 SBSQ HOSP IP/OBS SF/LOW 25: CPT

## 2020-08-03 PROCEDURE — 99233 SBSQ HOSP IP/OBS HIGH 50: CPT

## 2020-08-03 RX ORDER — VALPROIC ACID (AS SODIUM SALT) 250 MG/5ML
2 SOLUTION, ORAL ORAL
Qty: 90 | Refills: 3
Start: 2020-08-03

## 2020-08-03 RX ORDER — VALPROIC ACID (AS SODIUM SALT) 250 MG/5ML
250 SOLUTION, ORAL ORAL
Refills: 0 | Status: DISCONTINUED | OUTPATIENT
Start: 2020-08-03 | End: 2020-08-04

## 2020-08-03 RX ADMIN — Medication 250 MILLIGRAM(S): at 17:17

## 2020-08-03 RX ADMIN — RITUXIMAB 1000 MILLIGRAM(S): 10 INJECTION, SOLUTION INTRAVENOUS at 10:44

## 2020-08-03 RX ADMIN — ENOXAPARIN SODIUM 40 MILLIGRAM(S): 100 INJECTION SUBCUTANEOUS at 12:22

## 2020-08-03 RX ADMIN — CHLORHEXIDINE GLUCONATE 1 APPLICATION(S): 213 SOLUTION TOPICAL at 05:31

## 2020-08-03 RX ADMIN — Medication 116 MILLIGRAM(S): at 16:17

## 2020-08-03 RX ADMIN — Medication 50 MILLIGRAM(S): at 10:16

## 2020-08-03 RX ADMIN — Medication 250 MILLIGRAM(S): at 05:28

## 2020-08-03 RX ADMIN — SIMVASTATIN 10 MILLIGRAM(S): 20 TABLET, FILM COATED ORAL at 21:24

## 2020-08-03 RX ADMIN — Medication 650 MILLIGRAM(S): at 10:16

## 2020-08-03 RX ADMIN — FINASTERIDE 5 MILLIGRAM(S): 5 TABLET, FILM COATED ORAL at 12:22

## 2020-08-03 RX ADMIN — Medication 100 MILLIGRAM(S): at 10:16

## 2020-08-03 NOTE — PROGRESS NOTE ADULT - ASSESSMENT
considering LGI1 encephalitis- pending  Vid-EEG-cw sz-- tx as per epileptology- no sz on last recording   LP-results (-) so far  but LG1 Ab p   on Dpk- ? chk level as synptoms continue? adjust dose?  completed pharesis- on rituxan

## 2020-08-03 NOTE — PROGRESS NOTE ADULT - ASSESSMENT
76 yo male PMHx BPH, HTN, and HLD presents to ED with intermittent left upper extremity tonic clonic activity with progression to right upper and lower extremities and multiple falls since June, concerning for seizures. Outpatient MRI brain without contrast demonstrates hyperenhancement of right limbic lobe.     # Bilateral upper extremity tonic clonic activity   # autoimmune encephalitis  # HTN  # HLD  # BPH  # lung nodules and tree in bud opacities    Appreciate neurology care  Follow up serum autoimmune workup  cont proscar  cont statin  holding amlodipine  cont PLEX per neuro 4/5, last dose 7/31  cont depacon   Pending QuantiFeron plus TB; HBV PCR; HCV pending. RNA PCR pending.prior to vstarting Rituxan, .     PCP Dr. Tevin Ovalle  [facility]  [phone]

## 2020-08-03 NOTE — PROGRESS NOTE ADULT - SUBJECTIVE AND OBJECTIVE BOX
Patient is a 75y old  Male who presents with a chief complaint of limbic encephalitis (03 Aug 2020 12:31)      INTERVAL HPI/OVERNIGHT EVENTS: noted  pt seen and examined  oob to chair, twitching movements present      Vital Signs Last 24 Hrs  T(C): 36.6 (03 Aug 2020 15:30), Max: 36.9 (03 Aug 2020 15:26)  T(F): 97.9 (03 Aug 2020 15:30), Max: 98.4 (03 Aug 2020 15:26)  HR: 64 (03 Aug 2020 15:30) (55 - 81)  BP: 120/77 (03 Aug 2020 15:30) (107/74 - 142/83)  BP(mean): --  RR: 18 (03 Aug 2020 15:30) (18 - 18)  SpO2: 96% (03 Aug 2020 15:30) (96% - 99%)    chlorhexidine 4% Liquid 1 Application(s) Topical <User Schedule>  enoxaparin Injectable 40 milliGRAM(s) SubCutaneous daily  finasteride 5 milliGRAM(s) Oral daily  hydrocortisone sodium succinate Injectable 100 milliGRAM(s) IV Push once PRN  lidocaine 1% Injectable 20 milliLiter(s) Local Injection once  LORazepam   Injectable 1 milliGRAM(s) IV Push once PRN  methylPREDNISolone sodium succinate IVPB 1000 milliGRAM(s) IV Intermittent daily  polyethylene glycol 3350 17 Gram(s) Oral daily PRN  simvastatin 10 milliGRAM(s) Oral at bedtime  sodium chloride 0.9% lock flush 10 milliLiter(s) IV Push every 1 hour PRN  valproic acid 250 milliGRAM(s) Oral two times a day      PHYSICAL EXAM:  GENERAL: NAD,   EYES: conjunctiva and sclera clear  ENMT: Moist mucous membranes  NECK: Supple, No JVD, Normal thyroid  CHEST/LUNG: non labored, cta b/l  HEART: Regular rate and rhythm; No murmurs, rubs, or gallops  ABDOMEN: Soft, Nontender, Nondistended; Bowel sounds present  EXTREMITIES:  2+ Peripheral Pulses, No clubbing, cyanosis, or edema  LYMPH: No lymphadenopathy noted  SKIN: No rashes or lesions    Consultant(s) Notes Reviewed:  [x ] YES  [ ] NO  Care Discussed with Consultants/Other Providers [ x] YES  [ ] NO    LABS:                        12.6   7.86  )-----------( 200      ( 03 Aug 2020 06:45 )             38.5     08-03    134<L>  |  96  |  17  ----------------------------<  92  4.5   |  25  |  0.90    Ca    9.1      03 Aug 2020 06:45          CAPILLARY BLOOD GLUCOSE                  RADIOLOGY & ADDITIONAL TESTS:    Imaging Personally Reviewed:  [x ] YES  [ ] NO

## 2020-08-03 NOTE — PROGRESS NOTE ADULT - SUBJECTIVE AND OBJECTIVE BOX
Neurology Progress Note      the patient's symptoms abnl movements continue    MEDICATIONS  (STANDING):  chlorhexidine 4% Liquid 1 Application(s) Topical <User Schedule>  enoxaparin Injectable 40 milliGRAM(s) SubCutaneous daily  finasteride 5 milliGRAM(s) Oral daily  lidocaine 1% Injectable 20 milliLiter(s) Local Injection once  methylPREDNISolone sodium succinate IVPB 1000 milliGRAM(s) IV Intermittent daily  simvastatin 10 milliGRAM(s) Oral at bedtime  valproic acid 250 milliGRAM(s) Oral two times a day    MEDICATIONS  (PRN):  hydrocortisone sodium succinate Injectable 100 milliGRAM(s) IV Push once PRN Infusion reaction  LORazepam   Injectable 1 milliGRAM(s) IV Push once PRN for gtc seizure > 2 minutes  polyethylene glycol 3350 17 Gram(s) Oral daily PRN Constipation  sodium chloride 0.9% lock flush 10 milliLiter(s) IV Push every 1 hour PRN Pre/post blood products, medications, blood draw, and to maintain line patency              Vital Signs Last 24 Hrs  T(C): 36.4 (03 Aug 2020 11:17), Max: 36.8 (02 Aug 2020 23:31)  T(F): 97.6 (03 Aug 2020 11:17), Max: 98.3 (02 Aug 2020 23:31)  HR: 55 (03 Aug 2020 12:14) (55 - 78)  BP: 135/78 (03 Aug 2020 12:14) (107/74 - 142/83)  BP(mean): --  RR: 18 (03 Aug 2020 12:14) (18 - 18)  SpO2: 99% (03 Aug 2020 12:14) (96% - 99%)    Physical exam  MENTAL STATUS- Alert, attends, fluent, non-dysarthric, follows commands, oriented, good memory and  flat affect.  CRANIAL NERVES-II Optic - Bilateral - Normal Visual Fields. III-IV-VI EOMI & Pupils - Bilateral - Normal Bilaterally. V Trigeminal - Bilateral - Normal bilateral facial sensation and jaw movement. VII Facial - Normal bilateral facial movement. VIII Acoustic - Bilateral - Hearing normal to voice bilaterally. IX Glossopharyngeal / X Vagus - Normal palate elevates symmetrically. XI Accessory - Normal Bilaterally. XII Hypoglossal - Tongue protrudes midline and moves symmetrically.  MOTOR-Bulk and Contour - Normal. Tone - Normal tone, no abnormal movements. Strength - 5/5 normal muscle strength - Strength full throughout.  SENSORY-Normal - LT, DSS, Vibration.  REFLEXES- DTR's 2+ throughout.  COORDINATION-Normal FFM, SAMANTHA, FNF - bilaterally.  GAIT-NA    CBC Full  -  ( 03 Aug 2020 06:45 )  WBC Count : 7.86 K/uL  RBC Count : 4.31 M/uL  Hemoglobin : 12.6 g/dL  Hematocrit : 38.5 %  Platelet Count - Automated : 200 K/uL  Mean Cell Volume : 89.3 fl  Mean Cell Hemoglobin : 29.2 pg  Mean Cell Hemoglobin Concentration : 32.7 gm/dL  Auto Neutrophil # : x  Auto Lymphocyte # : x  Auto Monocyte # : x  Auto Eosinophil # : x  Auto Basophil # : x  Auto Neutrophil % : x  Auto Lymphocyte % : x  Auto Monocyte % : x  Auto Eosinophil % : x  Auto Basophil % : x      08-03    134<L>  |  96  |  17  ----------------------------<  92  4.5   |  25  |  0.90    Ca    9.1      03 Aug 2020 06:45          EEG no events seen on last 5 hr recording

## 2020-08-03 NOTE — CHART NOTE - NSCHARTNOTEFT_GEN_A_CORE
Right internal jugular vein hemodialysis shiley catheter removed at the bedside, manual compression held for 10 minutes, site without bleeding, dressing applied. No hematoma noted at site.

## 2020-08-03 NOTE — PROGRESS NOTE ADULT - SUBJECTIVE AND OBJECTIVE BOX
Subjective: Patient seen and examined. No new events except as noted.     REVIEW OF SYSTEMS:    CONSTITUTIONAL: +weakness, fevers or chills  EYES/ENT: No visual changes;  No vertigo or throat pain   NECK: No pain or stiffness  RESPIRATORY: No cough, wheezing, hemoptysis; No shortness of breath  CARDIOVASCULAR: No chest pain or palpitations  GASTROINTESTINAL: No abdominal or epigastric pain. No nausea, vomiting, or hematemesis; No diarrhea or constipation. No melena or hematochezia.  GENITOURINARY: No dysuria, frequency or hematuria  NEUROLOGICAL: No numbness or weakness  SKIN: No itching, burning, rashes, or lesions   All other review of systems is negative unless indicated above.    MEDICATIONS:  MEDICATIONS  (STANDING):  chlorhexidine 4% Liquid 1 Application(s) Topical <User Schedule>  enoxaparin Injectable 40 milliGRAM(s) SubCutaneous daily  finasteride 5 milliGRAM(s) Oral daily  lidocaine 1% Injectable 20 milliLiter(s) Local Injection once  methylPREDNISolone sodium succinate IVPB 1000 milliGRAM(s) IV Intermittent daily  simvastatin 10 milliGRAM(s) Oral at bedtime  valproic acid 250 milliGRAM(s) Oral two times a day      PHYSICAL EXAM:  T(C): 36.3 (08-03-20 @ 10:29), Max: 36.8 (08-02-20 @ 23:31)  HR: 78 (08-03-20 @ 10:29) (58 - 78)  BP: 119/78 (08-03-20 @ 10:29) (107/74 - 132/75)  RR: 18 (08-03-20 @ 10:29) (18 - 18)  SpO2: 97% (08-03-20 @ 10:29) (95% - 98%)  Wt(kg): --  I&O's Summary    02 Aug 2020 07:01  -  03 Aug 2020 07:00  --------------------------------------------------------  IN: 480 mL / OUT: 200 mL / NET: 280 mL          Appearance: Normal	  HEENT:   Normal oral mucosa, PERRL, EOMI	  Lymphatic: No lymphadenopathy , no edema  Cardiovascular: Normal S1 S2, No JVD, No murmurs , Peripheral pulses palpable 2+ bilaterally  Respiratory: Lungs clear to auscultation, normal effort 	  Gastrointestinal:  Soft, Non-tender, + BS	  Skin: No rashes, No ecchymoses, No cyanosis, warm to touch  Musculoskeletal: Normal range of motion, normal strength  Psychiatry:  Mood & affect appropriate  Ext: No edema      LABS:    CARDIAC MARKERS:                                12.6   7.86  )-----------( 200      ( 03 Aug 2020 06:45 )             38.5     08-03    134<L>  |  96  |  17  ----------------------------<  92  4.5   |  25  |  0.90    Ca    9.1      03 Aug 2020 06:45      proBNP:   Lipid Profile:   HgA1c:   TSH:             TELEMETRY: 	    ECG:  	  RADIOLOGY:   DIAGNOSTIC TESTING:  [ ] Echocardiogram:  [ ]  Catheterization:  [ ] Stress Test:    OTHER:

## 2020-08-03 NOTE — PROGRESS NOTE ADULT - SUBJECTIVE AND OBJECTIVE BOX
patient lethargic, just received benadryl     REVIEW OF SYSTEMS  Constitutional - No fever,  No fatigue  HEENT - No vertigo, No neck pain  Neurological - No headaches, No memory loss, No loss of strength, No numbness, No tremors  Skin - No rashes, No lesions   Musculoskeletal - No joint pain, No joint swelling, No muscle pain  Psychiatric - No depression, No anxiety    FUNCTIONAL PROGRESS  7/30 PT    Bed Mobility  Bed Mobility Training Scooting: contact guard;  1 person assist;  nonverbal cues (demo/gestures);  verbal cues  Bed Mobility Training Supine-to-Sit: contact guard;  1 person assist;  nonverbal cues (demo/gestures);  verbal cues  Bed Mobility Training Limitations: decreased strength;  impaired balance;  cognitive, decreased safety awareness    Sit-Stand Transfer Training  Transfer Training Sit-to-Stand Transfer: contact guard;  1 person assist;  nonverbal cues (demo/gestures);  verbal cues;  full weight-bearing   rolling walker;  prog to sup  Transfer Training Stand-to-Sit Transfer: contact guard;  1 person assist;  verbal cues;  nonverbal cues (demo/gestures);  full weight-bearing   rolling walker;  prog to sup  Sit-to-Stand Transfer Training Transfer Safety Analysis: decreased balance;  decreased sequencing ability;  cognitive, decreased safety awareness;  impaired balance;  decreased strength    Gait Training  Gait Training: contact guard;  1 person assist;  nonverbal cues (demo/gestures);  verbal cues;  full weight-bearing   rolling walker;  50 feet;  pt amb an additional 50ft with HHA/CGA  Gait Analysis: 2-point gait   decreased step length;  decreased stride length;  decreased strength;  impaired balance;  cognitive, decreased safety awareness;  50 feet;  rolling walker;  pt amb an additional 50ft with HHA/CGA    Therapeutic Exercise  Therapeutic Exercise Detail: Pt clarita standing dynamic reaching out of ED 1x10. LOBx2 with assist to correct. Pt clarita standing marches and heel raises.     8/3 OT      Sit-Stand Transfer Training  Transfer Training Sit-to-Stand Transfer: contact guard;  verbal cues;  nonverbal cues (demo/gestures);  rolling walker  Transfer Training Stand-to-Sit Transfer: contact guard;  verbal cues;  nonverbal cues (demo/gestures);  rolling walker  Sit-to-Stand Transfer Training Transfer Safety Analysis: decreased cognition;  decreased balance;  decreased strength;  impaired balance;  rolling walker    Therapeutic Exercise  Therapeutic Exercise Detail: Pt performed sit to stand transfer with CGA for safety awareness, functional mobility to bathroom with CGA with RW, pt returned to chair and performed B UE/ LE flexion/extension AROM 2x10, required visual aid to perform AROM WFL.     Fine Motor Coordination Training  Fine Motor Coordination Training Charges: Pt reported his hands feel weaker and wished to perform fine motor tasks. Pt opened and closed water bottle ind x2 and performed hand writing tasks as he needs to write bills at home seated in chair with tray table in front. Fine motor skills present WFL, no signs of fatigue or deficits noted.     Toilet Training  Toilet Training Charges: Pt ambulated to bathroom with CGA with RW, required verbal to navigate with RW and safety awareness. Pt used grab bars to sit and stand.   Toilet Training Assistance: contact guard;  verbal cues;  nonverbal cues (demo/gestures);  cognitive, decreased safety awareness;  impaired balance;  decreased strength    VITALS  T(C): 36.4 (08-03-20 @ 11:17), Max: 36.8 (08-02-20 @ 23:31)  HR: 56 (08-03-20 @ 11:17) (56 - 78)  BP: 132/73 (08-03-20 @ 11:17) (107/74 - 132/75)  RR: 18 (08-03-20 @ 11:17) (18 - 18)  SpO2: 99% (08-03-20 @ 11:17) (95% - 99%)  Wt(kg): --    MEDICATIONS   chlorhexidine 4% Liquid 1 Application(s) <User Schedule>  enoxaparin Injectable 40 milliGRAM(s) daily  finasteride 5 milliGRAM(s) daily  hydrocortisone sodium succinate Injectable 100 milliGRAM(s) once PRN  lidocaine 1% Injectable 20 milliLiter(s) once  LORazepam   Injectable 1 milliGRAM(s) once PRN  methylPREDNISolone sodium succinate IVPB 1000 milliGRAM(s) daily  polyethylene glycol 3350 17 Gram(s) daily PRN  simvastatin 10 milliGRAM(s) at bedtime  sodium chloride 0.9% lock flush 10 milliLiter(s) every 1 hour PRN  valproic acid 250 milliGRAM(s) two times a day      RECENT LABS - Reviewed                        12.6   7.86  )-----------( 200      ( 03 Aug 2020 06:45 )             38.5     08-03    134<L>  |  96  |  17  ----------------------------<  92  4.5   |  25  |  0.90    Ca    9.1      03 Aug 2020 06:45  -------------------------------------------------------------------------  PHYSICAL EXAM  Constitutional - NAD, Comfortable, in chair   HEENT - NCAT, EOMI  Neck - Supple, No limited ROM  Chest - Breathing comfortably, No wheezing  Cardiovascular - S1S2   Abdomen - Soft   Extremities - No C/C/E, No calf tenderness   Neurologic Exam -                    Cognitive - Awake, Alert, AAO to self, place, date, year, situation     Communication - Fluent, No dysarthria     Cranial Nerves - CN 2-12 intact     Motor -                     LEFT    UE - ShAB 5/5, EF 5/5, EE 5/5, WE 5/5,  5/5                    RIGHT UE - ShAB 5/5, EF 5/5, EE 5/5, WE 5/5,  5/5                    LEFT    LE - HF 5/5, KE 5/5, DF 5/5, PF 5/5                    RIGHT LE - HF 5/5, KE 5/5, DF 5/5, PF 5/5        Sensory - Intact to LT       Balance - WNL Static.   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   Encephalitis - s/p PLEX.  c/w VPA for sz control. to have Rituxan x 1 treatment on today  Pain - Tylenol PRN  BPH - c/w finasteride  HLD - c/w statin  PT/OT - daily therapies for ROM, increased mobility, and decreased debility related to hospitalization.  Balance, gait training, ADLs, transfers, and bracing/assistive devices as needed.    DVT PPX - lovenox  Rehab -   continue PT/OT at bedside  contact guard for ambulation, toilet training   Recommend ACUTE inpatient rehabilitation for the functional and cognitive deficits consisting of 3 hours of therapy/day & 24 hour RN/daily PMR physician for comorbid medical management. Will continue to follow for ongoing rehab needs and recommendations. Patient will be able to tolerate 3 hours a day.  Discussed recommendation with patient, who is amenable to rehab.

## 2020-08-03 NOTE — PROGRESS NOTE ADULT - ASSESSMENT
74yo man with PMHx BPH, HTN, and HLD presents to ED with intermittent left upper extremity tonic clonic activity with progression to right upper and lower extremities and multiple falls since June, concerning for seizures. Outpatient MRI brain w/o contrast demonstrates hyperenhancement of right limbic lobe.     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 Quat TB neg, Hep B and C Ab negative.   [X] Discontinue vEEG  [X] PLEX 5/5 treatments completed, last dose 7/31/20  [x] Continue Valproic Acid 250 mg AM, and Valproic Acid 500 mg PM dose   [] OT Re-evaluation prior to discharge    Case discussed with Dr. Jose 76yo man with PMHx BPH, HTN, and HLD presents to ED with intermittent left upper extremity tonic clonic activity with progression to right upper and lower extremities and multiple falls since June, concerning for seizures. Outpatient MRI brain w/o contrast demonstrates hyperenhancement of right limbic lobe.     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 Jose 74yo man with PMHx BPH, HTN, and HLD presents to ED with intermittent left upper extremity tonic clonic activity with progression to right upper and lower extremities and multiple falls since June, concerning for seizures. Outpatient MRI brain w/o contrast demonstrates hyperenhancement of right limbic lobe.     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 Jose 76yo man with PMHx BPH, HTN, and HLD presents to ED with intermittent left upper extremity tonic clonic activity with progression to right upper and lower extremities and multiple falls since June, concerning for seizures. Outpatient MRI brain w/o contrast demonstrates hyperenhancement of right limbic lobe.     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 Jose

## 2020-08-03 NOTE — ADVANCED PRACTICE NURSE CONSULT - ASSESSMENT
received pt in chair alert and oriented x4 able to express his needs as neurology ok to give rituxin today inspite of hepC RNA and hepB PCR quantitative results pending  Cycle # day 1/1.Height and weight verified. Lab results as per Md radha aware of same. Vital signs stable prior to the start of chemotherapy, see sunrise.  Pt educated on the importance of saving urine, verbalize understanding of same.Pt education done re chemo regimen, drug effects and potential side effects, written material provided, pt states understanding. Pt with PIV line on left forearm  site free from signs and symptoms of infection, positive blood return obtained. Pre-medicated with tylenol 650mg pox1, benadryl 50mg ivpx1, hydrocortisone 100mg ivpx1 after premeds pt slightly sleepy but arousable sec to benadryl pt started on rituxin 1000mg iv intiated at 1050 at 50cc/hr received pt in chair alert and oriented x4 able to express his needs as neurology ok to give rituxin today inspite of hepC RNA and hepB PCR quantitative results pending  Cycle # day 1/1.Height and weight verified. Lab results as per Md radha aware of same. Vital signs stable prior to the start of chemotherapy, see sunrise.  Pt educated on the importance of saving urine, verbalize understanding of same.Pt education done re chemo regimen, drug effects and potential side effects, written material provided, pt states understanding. Pt with PIV line on left forearm  site free from signs and symptoms of infection, positive blood return obtained. Pre-medicated with tylenol 650mg pox1, benadryl 50mg ivpx1, hydrocortisone 100mg ivpx1 after premeds pt slightly sleepy but arousable sec to benadryl pt started on rituxin 1000mg iv intiated at 1050 at 50cc/hr tolerated x30 min increased rate to 100cc/hr v/s checked q30 min and stable increased rate by 50cc/hr every 30min received pt in chair alert and oriented x4 able to express his needs as neurology ok to give rituxin today inspite of hepC RNA and hepB PCR quantitative results pending  Cycle # day 1/1.Height and weight verified. Lab results as per Md radha aware of same. Vital signs stable prior to the start of chemotherapy, see sunrise.  Pt educated on the importance of saving urine, verbalize understanding of same.Pt education done re chemo regimen, drug effects and potential side effects, written material provided, pt states understanding. Pt with PIV line on left forearm  site free from signs and symptoms of infection, positive blood return obtained. Pre-medicated with tylenol 650mg pox1, benadryl 50mg ivpx1, hydrocortisone 100mg ivpx1 after premeds pt slightly sleepy but arousable sec to benadryl pt started on rituxin 1000mg iv intiated at 1050 at 50cc/hr tolerated x30 min increased rate to 100cc/hr v/s checked q30 min and stable increased rate by 50cc/hr every 30min to a max rate of 400cc/hr pt assisted back to bed wanted to rest and then assisted to bathroom and then to chair received pt in chair alert and oriented x4 able to express his needs as neurology ok to give rituxin today inspite of hepC RNA and hepB PCR quantitative results pending  Cycle # day 1/1.Height and weight verified. Lab results as per Md radha aware of same. Vital signs stable prior to the start of chemotherapy, see sunrise.  Pt educated on the importance of saving urine, verbalize understanding of same.Pt education done re chemo regimen, drug effects and potential side effects, written material provided, pt states understanding. Pt with PIV line on left forearm  site free from signs and symptoms of infection, positive blood return obtained. Pre-medicated with tylenol 650mg pox1, benadryl 50mg ivpx1, hydrocortisone 100mg ivpx1 after premeds pt slightly sleepy but arousable sec to benadryl pt started on rituxin 1000mg iv intiated at 1050 at 50cc/hr tolerated x30 min increased rate to 100cc/hr v/s checked q30 min and stable increased rate by 50cc/hr every 30min to a max rate of 400cc/hr pt assisted back to bed wanted to rest and then assisted to bathroom and then to chair completed infusion with no adverse effects report given to the area nurse

## 2020-08-03 NOTE — CHART NOTE - NSCHARTNOTEFT_GEN_A_CORE
Nutrition Follow Up Note  Patient seen for: routine follow up    Source: Comprehensive chart review and pt    Chart reviewed, events noted. 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     Diet : regular    Subjective: Pt reports having a continued good appetite; consuming >75% of meals. States that he has no complaints of meals in-house. Pt noted to still be on regular diet, but reports tolerating it well with no pain/discomfort chewing. No new food preferences noted. Pt denies any acute GI issues at this time; reports last BM being last night (8/2).        Enteral /Parenteral Nutrition: n/a      Daily Weight in kg:   % Weight Change - no new weights    Pertinent Medications: MEDICATIONS  (STANDING):  chlorhexidine 4% Liquid 1 Application(s) Topical <User Schedule>  enoxaparin Injectable 40 milliGRAM(s) SubCutaneous daily  finasteride 5 milliGRAM(s) Oral daily  lidocaine 1% Injectable 20 milliLiter(s) Local Injection once  methylPREDNISolone sodium succinate IVPB 1000 milliGRAM(s) IV Intermittent daily  simvastatin 10 milliGRAM(s) Oral at bedtime  valproic acid 250 milliGRAM(s) Oral two times a day    MEDICATIONS  (PRN):  hydrocortisone sodium succinate Injectable 100 milliGRAM(s) IV Push once PRN Infusion reaction  LORazepam   Injectable 1 milliGRAM(s) IV Push once PRN for gtc seizure > 2 minutes  polyethylene glycol 3350 17 Gram(s) Oral daily PRN Constipation  sodium chloride 0.9% lock flush 10 milliLiter(s) IV Push every 1 hour PRN Pre/post blood products, medications, blood draw, and to maintain line patency    Pertinent Labs: 08-03 @ 06:45: Na 134<L>, BUN 17, Cr 0.90, BG 92, K+ 4.5, Phos --, Mg --, Alk Phos --, ALT/SGPT --, AST/SGOT --, HbA1c --    Finger Sticks:      Skin per nursing documentation: no pressure injury documented   Edema: none noted    Estimated Needs:   [X] no change since previous assessment  [ ] recalculated:     Previous Nutrition Diagnosis: none    New Nutrition Diagnosis: none       Interventions:     Recommend  1) Continue current diet order as tolerated.   2) RD will continue to provide Mighty Shake at meals.       Monitoring and Evaluation:     Continue to monitor Nutritional intake, Tolerance to diet prescription, weights, labs, skin integrity    RD remains available upon request and will follow up per protocol    Markus Marks RD pager # [phone]

## 2020-08-03 NOTE — PROGRESS NOTE ADULT - SUBJECTIVE AND OBJECTIVE BOX
SUBJECTIVE: No overnight events. Has 6-7 LUE spasms per day, drastically decreased from near continous spasms that he was having prior to admission.   Pending Rituxan, awaiting labs    Vital Signs Last 24 Hrs  T(C): 36.4 (03 Aug 2020 04:48), Max: 37 (02 Aug 2020 08:34)  T(F): 97.5 (03 Aug 2020 04:48), Max: 98.6 (02 Aug 2020 08:34)  HR: 62 (03 Aug 2020 04:48) (60 - 77)  BP: 121/75 (03 Aug 2020 04:48) (116/79 - 149/71)  RR: 18 (03 Aug 2020 04:48) (18 - 18)  SpO2: 98% (03 Aug 2020 04:48) (95% - 98%)      Exam:   MS: Oriented x3. Fluent. Follows crossed commands.   CN: VFF. EOMI. V1-V3 intact. Face symmetric. T/u midline.   Motor: Full strength throughout.   Sensory: Intact to LT throughout   Reflexes: 2+ throughout. Babinski absent bilaterally.   Coordination: No dysmetria on FNF or ataxia on HTS bilaterally   Gait: Deferred     U/A   MEDICATIONS  (STANDING):  acetaminophen   Tablet .. 650 milliGRAM(s) Oral once  chlorhexidine 4% Liquid 1 Application(s) Topical <User Schedule>  diphenhydrAMINE   Injectable 50 milliGRAM(s) IV Push once  enoxaparin Injectable 40 milliGRAM(s) SubCutaneous daily  finasteride 5 milliGRAM(s) Oral daily  hydrocortisone sodium succinate Injectable 100 milliGRAM(s) IV Push once  lidocaine 1% Injectable 20 milliLiter(s) Local Injection once  riTUXimab IVPB (Non - oncologic) 1000 milliGRAM(s) IV Intermittent once  simvastatin 10 milliGRAM(s) Oral at bedtime  valproic acid 500 milliGRAM(s) Oral at bedtime  valproic acid 250 milliGRAM(s) Oral <User Schedule>    MEDICATIONS  (PRN):  hydrocortisone sodium succinate Injectable 100 milliGRAM(s) IV Push once PRN Infusion reaction  LORazepam   Injectable 1 milliGRAM(s) IV Push once PRN for gtc seizure > 2 minutes  polyethylene glycol 3350 17 Gram(s) Oral daily PRN Constipation  sodium chloride 0.9% lock flush 10 milliLiter(s) IV Push every 1 hour PRN Pre/post blood products, medications, blood draw, and to maintain line patency SUBJECTIVE: No overnight events. No new complaints     Vital Signs Last 24 Hrs  T(C): 36.4 (03 Aug 2020 04:48), Max: 37 (02 Aug 2020 08:34)  T(F): 97.5 (03 Aug 2020 04:48), Max: 98.6 (02 Aug 2020 08:34)  HR: 62 (03 Aug 2020 04:48) (60 - 77)  BP: 121/75 (03 Aug 2020 04:48) (116/79 - 149/71)  RR: 18 (03 Aug 2020 04:48) (18 - 18)  SpO2: 98% (03 Aug 2020 04:48) (95% - 98%)      Exam:   MS: Oriented x3. Fluent. Follows crossed commands.   CN: VFF. EOMI. V1-V3 intact. Face symmetric. T/u midline.   Motor: Full strength throughout.   Sensory: Intact to LT throughout   Reflexes: 2+ throughout. Babinski absent bilaterally.   Coordination: No dysmetria on FNF or ataxia on HTS bilaterally   Gait: Deferred     U/A   MEDICATIONS  (STANDING):  acetaminophen   Tablet .. 650 milliGRAM(s) Oral once  chlorhexidine 4% Liquid 1 Application(s) Topical <User Schedule>  diphenhydrAMINE   Injectable 50 milliGRAM(s) IV Push once  enoxaparin Injectable 40 milliGRAM(s) SubCutaneous daily  finasteride 5 milliGRAM(s) Oral daily  hydrocortisone sodium succinate Injectable 100 milliGRAM(s) IV Push once  lidocaine 1% Injectable 20 milliLiter(s) Local Injection once  riTUXimab IVPB (Non - oncologic) 1000 milliGRAM(s) IV Intermittent once  simvastatin 10 milliGRAM(s) Oral at bedtime  valproic acid 500 milliGRAM(s) Oral at bedtime  valproic acid 250 milliGRAM(s) Oral <User Schedule>    MEDICATIONS  (PRN):  hydrocortisone sodium succinate Injectable 100 milliGRAM(s) IV Push once PRN Infusion reaction  LORazepam   Injectable 1 milliGRAM(s) IV Push once PRN for gtc seizure > 2 minutes  polyethylene glycol 3350 17 Gram(s) Oral daily PRN Constipation  sodium chloride 0.9% lock flush 10 milliLiter(s) IV Push every 1 hour PRN Pre/post blood products, medications, blood draw, and to maintain line patency SUBJECTIVE: Patient stated "had a few spasms this morning." Patient had one episode of spasm on the left arm/shoulder during AM rounds. Overnight slept well.     MEDICATIONS  (STANDING):  acetaminophen   Tablet .. 650 milliGRAM(s) Oral once  chlorhexidine 4% Liquid 1 Application(s) Topical <User Schedule>  diphenhydrAMINE   Injectable 50 milliGRAM(s) IV Push once  enoxaparin Injectable 40 milliGRAM(s) SubCutaneous daily  finasteride 5 milliGRAM(s) Oral daily  hydrocortisone sodium succinate Injectable 100 milliGRAM(s) IV Push once  lidocaine 1% Injectable 20 milliLiter(s) Local Injection once  riTUXimab IVPB (Non - oncologic) 1000 milliGRAM(s) IV Intermittent once  simvastatin 10 milliGRAM(s) Oral at bedtime  valproic acid 500 milliGRAM(s) Oral at bedtime  valproic acid 250 milliGRAM(s) Oral <User Schedule>    MEDICATIONS  (PRN):  hydrocortisone sodium succinate Injectable 100 milliGRAM(s) IV Push once PRN Infusion reaction  LORazepam   Injectable 1 milliGRAM(s) IV Push once PRN for gtc seizure > 2 minutes  polyethylene glycol 3350 17 Gram(s) Oral daily PRN Constipation  sodium chloride 0.9% lock flush 10 milliLiter(s) IV Push every 1 hour PRN Pre/post blood products, medications, blood draw, and to maintain line patency    Vital Signs Last 24 Hrs  T(C): 36.4 (03 Aug 2020 04:48), Max: 37 (02 Aug 2020 08:34)  T(F): 97.5 (03 Aug 2020 04:48), Max: 98.6 (02 Aug 2020 08:34)  HR: 62 (03 Aug 2020 04:48) (60 - 77)  BP: 121/75 (03 Aug 2020 04:48) (116/79 - 149/71)  RR: 18 (03 Aug 2020 04:48) (18 - 18)  SpO2: 98% (03 Aug 2020 04:48) (95% - 98%)      Exam:   MS: Oriented x3. Fluent. Follows crossed commands.   CN: VFF. EOMI. V1-V3 intact. Face symmetric. T/u midline.   Motor: Full strength throughout.   Sensory: Intact to LT throughout   Reflexes: 2+ throughout. Babinski absent bilaterally.   Coordination: No dysmetria on FNF or ataxia on HTS bilaterally   Gait: Deferred

## 2020-08-04 ENCOUNTER — TRANSCRIPTION ENCOUNTER (OUTPATIENT)
Age: 75
End: 2020-08-04

## 2020-08-04 ENCOUNTER — INPATIENT (INPATIENT)
Facility: HOSPITAL | Age: 75
LOS: 10 days | Discharge: HOME HEALTH | DRG: 949 | End: 2020-08-15
Attending: PHYSICAL MEDICINE & REHABILITATION | Admitting: PHYSICAL MEDICINE & REHABILITATION
Payer: COMMERCIAL

## 2020-08-04 VITALS
SYSTOLIC BLOOD PRESSURE: 119 MMHG | HEART RATE: 73 BPM | DIASTOLIC BLOOD PRESSURE: 65 MMHG | TEMPERATURE: 98 F | RESPIRATION RATE: 18 BRPM | OXYGEN SATURATION: 97 %

## 2020-08-04 VITALS
TEMPERATURE: 98 F | RESPIRATION RATE: 15 BRPM | SYSTOLIC BLOOD PRESSURE: 133 MMHG | HEART RATE: 61 BPM | DIASTOLIC BLOOD PRESSURE: 67 MMHG | OXYGEN SATURATION: 95 %

## 2020-08-04 DIAGNOSIS — G04.81 OTHER ENCEPHALITIS AND ENCEPHALOMYELITIS: ICD-10-CM

## 2020-08-04 PROBLEM — N40.0 BENIGN PROSTATIC HYPERPLASIA WITHOUT LOWER URINARY TRACT SYMPTOMS: Chronic | Status: ACTIVE | Noted: 2020-07-21

## 2020-08-04 PROBLEM — E78.5 HYPERLIPIDEMIA, UNSPECIFIED: Chronic | Status: ACTIVE | Noted: 2020-07-21

## 2020-08-04 PROBLEM — I10 ESSENTIAL (PRIMARY) HYPERTENSION: Chronic | Status: ACTIVE | Noted: 2020-07-21

## 2020-08-04 LAB — VOLTAGE-GATED K CHANNEL AB RESULT: 728 PMOL/L — HIGH (ref 0–31)

## 2020-08-04 PROCEDURE — 99223 1ST HOSP IP/OBS HIGH 75: CPT | Mod: 25

## 2020-08-04 PROCEDURE — 84166 PROTEIN E-PHORESIS/URINE/CSF: CPT

## 2020-08-04 PROCEDURE — 97116 GAIT TRAINING THERAPY: CPT

## 2020-08-04 PROCEDURE — 85730 THROMBOPLASTIN TIME PARTIAL: CPT

## 2020-08-04 PROCEDURE — 86255 FLUORESCENT ANTIBODY SCREEN: CPT

## 2020-08-04 PROCEDURE — 87340 HEPATITIS B SURFACE AG IA: CPT

## 2020-08-04 PROCEDURE — 87483 CNS DNA AMP PROBE TYPE 12-25: CPT

## 2020-08-04 PROCEDURE — 86235 NUCLEAR ANTIGEN ANTIBODY: CPT

## 2020-08-04 PROCEDURE — 97166 OT EVAL MOD COMPLEX 45 MIN: CPT

## 2020-08-04 PROCEDURE — 86780 TREPONEMA PALLIDUM: CPT

## 2020-08-04 PROCEDURE — 71260 CT THORAX DX C+: CPT

## 2020-08-04 PROCEDURE — P9041: CPT

## 2020-08-04 PROCEDURE — 88185 FLOWCYTOMETRY/TC ADD-ON: CPT

## 2020-08-04 PROCEDURE — 80048 BASIC METABOLIC PNL TOTAL CA: CPT

## 2020-08-04 PROCEDURE — 82746 ASSAY OF FOLIC ACID SERUM: CPT

## 2020-08-04 PROCEDURE — 95712 VEEG 2-12 HR INTMT MNTR: CPT

## 2020-08-04 PROCEDURE — 97535 SELF CARE MNGMENT TRAINING: CPT

## 2020-08-04 PROCEDURE — 87070 CULTURE OTHR SPECIMN AEROBIC: CPT

## 2020-08-04 PROCEDURE — 83519 RIA NONANTIBODY: CPT

## 2020-08-04 PROCEDURE — 97530 THERAPEUTIC ACTIVITIES: CPT

## 2020-08-04 PROCEDURE — 85027 COMPLETE CBC AUTOMATED: CPT

## 2020-08-04 PROCEDURE — 83735 ASSAY OF MAGNESIUM: CPT

## 2020-08-04 PROCEDURE — P9045: CPT

## 2020-08-04 PROCEDURE — 82330 ASSAY OF CALCIUM: CPT

## 2020-08-04 PROCEDURE — 95715 VEEG EA 12-26HR INTMT MNTR: CPT

## 2020-08-04 PROCEDURE — 86803 HEPATITIS C AB TEST: CPT

## 2020-08-04 PROCEDURE — 84443 ASSAY THYROID STIM HORMONE: CPT

## 2020-08-04 PROCEDURE — 82164 ANGIOTENSIN I ENZYME TEST: CPT

## 2020-08-04 PROCEDURE — 84165 PROTEIN E-PHORESIS SERUM: CPT

## 2020-08-04 PROCEDURE — 87517 HEPATITIS B DNA QUANT: CPT

## 2020-08-04 PROCEDURE — 86703 HIV-1/HIV-2 1 RESULT ANTBDY: CPT

## 2020-08-04 PROCEDURE — 97110 THERAPEUTIC EXERCISES: CPT

## 2020-08-04 PROCEDURE — 86850 RBC ANTIBODY SCREEN: CPT

## 2020-08-04 PROCEDURE — 36415 COLL VENOUS BLD VENIPUNCTURE: CPT

## 2020-08-04 PROCEDURE — 77001 FLUOROGUIDE FOR VEIN DEVICE: CPT

## 2020-08-04 PROCEDURE — 86901 BLOOD TYPING SEROLOGIC RH(D): CPT

## 2020-08-04 PROCEDURE — 86341 ISLET CELL ANTIBODY: CPT

## 2020-08-04 PROCEDURE — 86706 HEP B SURFACE ANTIBODY: CPT

## 2020-08-04 PROCEDURE — 97130 THER IVNTJ EA ADDL 15 MIN: CPT

## 2020-08-04 PROCEDURE — U0003: CPT

## 2020-08-04 PROCEDURE — 36556 INSERT NON-TUNNEL CV CATH: CPT

## 2020-08-04 PROCEDURE — 95700 EEG CONT REC W/VID EEG TECH: CPT

## 2020-08-04 PROCEDURE — 86403 PARTICLE AGGLUT ANTBDY SCRN: CPT

## 2020-08-04 PROCEDURE — 85652 RBC SED RATE AUTOMATED: CPT

## 2020-08-04 PROCEDURE — 82945 GLUCOSE OTHER FLUID: CPT

## 2020-08-04 PROCEDURE — 88184 FLOWCYTOMETRY/ TC 1 MARKER: CPT

## 2020-08-04 PROCEDURE — 93306 TTE W/DOPPLER COMPLETE: CPT

## 2020-08-04 PROCEDURE — 97129 THER IVNTJ 1ST 15 MIN: CPT

## 2020-08-04 PROCEDURE — 86225 DNA ANTIBODY NATIVE: CPT

## 2020-08-04 PROCEDURE — 83090 ASSAY OF HOMOCYSTEINE: CPT

## 2020-08-04 PROCEDURE — C1752: CPT

## 2020-08-04 PROCEDURE — 86900 BLOOD TYPING SEROLOGIC ABO: CPT

## 2020-08-04 PROCEDURE — 82607 VITAMIN B-12: CPT

## 2020-08-04 PROCEDURE — 97161 PT EVAL LOW COMPLEX 20 MIN: CPT

## 2020-08-04 PROCEDURE — 74177 CT ABD & PELVIS W/CONTRAST: CPT

## 2020-08-04 PROCEDURE — 82962 GLUCOSE BLOOD TEST: CPT

## 2020-08-04 PROCEDURE — 83921 ORGANIC ACID SINGLE QUANT: CPT

## 2020-08-04 PROCEDURE — 85384 FIBRINOGEN ACTIVITY: CPT

## 2020-08-04 PROCEDURE — 93005 ELECTROCARDIOGRAM TRACING: CPT

## 2020-08-04 PROCEDURE — 76937 US GUIDE VASCULAR ACCESS: CPT

## 2020-08-04 PROCEDURE — 87635 SARS-COV-2 COVID-19 AMP PRB: CPT

## 2020-08-04 PROCEDURE — 82042 OTHER SOURCE ALBUMIN QUAN EA: CPT

## 2020-08-04 PROCEDURE — 80053 COMPREHEN METABOLIC PANEL: CPT

## 2020-08-04 PROCEDURE — 87476 LYME DIS DNA AMP PROBE: CPT

## 2020-08-04 PROCEDURE — 86038 ANTINUCLEAR ANTIBODIES: CPT

## 2020-08-04 PROCEDURE — 86340 INTRINSIC FACTOR ANTIBODY: CPT

## 2020-08-04 PROCEDURE — 87529 HSV DNA AMP PROBE: CPT

## 2020-08-04 PROCEDURE — 85610 PROTHROMBIN TIME: CPT

## 2020-08-04 PROCEDURE — 86140 C-REACTIVE PROTEIN: CPT

## 2020-08-04 PROCEDURE — 86160 COMPLEMENT ANTIGEN: CPT

## 2020-08-04 PROCEDURE — 70553 MRI BRAIN STEM W/O & W/DYE: CPT

## 2020-08-04 PROCEDURE — 36514 APHERESIS PLASMA: CPT

## 2020-08-04 PROCEDURE — 86480 TB TEST CELL IMMUN MEASURE: CPT

## 2020-08-04 PROCEDURE — 86769 SARS-COV-2 COVID-19 ANTIBODY: CPT

## 2020-08-04 PROCEDURE — 99238 HOSP IP/OBS DSCHRG MGMT 30/<: CPT

## 2020-08-04 PROCEDURE — 84157 ASSAY OF PROTEIN OTHER: CPT

## 2020-08-04 PROCEDURE — 81003 URINALYSIS AUTO W/O SCOPE: CPT

## 2020-08-04 PROCEDURE — 87102 FUNGUS ISOLATION CULTURE: CPT

## 2020-08-04 PROCEDURE — 85362 FIBRIN DEGRADATION PRODUCTS: CPT

## 2020-08-04 PROCEDURE — 87205 SMEAR GRAM STAIN: CPT

## 2020-08-04 PROCEDURE — 88108 CYTOPATH CONCENTRATE TECH: CPT

## 2020-08-04 PROCEDURE — 83873 ASSAY OF CSF PROTEIN: CPT

## 2020-08-04 PROCEDURE — C1769: CPT

## 2020-08-04 PROCEDURE — 89051 BODY FLUID CELL COUNT: CPT

## 2020-08-04 PROCEDURE — 87521 HEPATITIS C PROBE&RVRS TRNSC: CPT

## 2020-08-04 PROCEDURE — 86376 MICROSOMAL ANTIBODY EACH: CPT

## 2020-08-04 PROCEDURE — A9585: CPT

## 2020-08-04 PROCEDURE — 86705 HEP B CORE ANTIBODY IGM: CPT

## 2020-08-04 PROCEDURE — 99285 EMERGENCY DEPT VISIT HI MDM: CPT

## 2020-08-04 PROCEDURE — 84155 ASSAY OF PROTEIN SERUM: CPT

## 2020-08-04 PROCEDURE — 99232 SBSQ HOSP IP/OBS MODERATE 35: CPT

## 2020-08-04 RX ORDER — ACETAMINOPHEN 500 MG
650 TABLET ORAL EVERY 6 HOURS
Refills: 0 | Status: DISCONTINUED | OUTPATIENT
Start: 2020-08-04 | End: 2020-08-15

## 2020-08-04 RX ORDER — SENNA PLUS 8.6 MG/1
2 TABLET ORAL AT BEDTIME
Refills: 0 | Status: DISCONTINUED | OUTPATIENT
Start: 2020-08-04 | End: 2020-08-15

## 2020-08-04 RX ORDER — FINASTERIDE 5 MG/1
5 TABLET, FILM COATED ORAL DAILY
Refills: 0 | Status: DISCONTINUED | OUTPATIENT
Start: 2020-08-05 | End: 2020-08-15

## 2020-08-04 RX ORDER — ENOXAPARIN SODIUM 100 MG/ML
40 INJECTION SUBCUTANEOUS DAILY
Refills: 0 | Status: DISCONTINUED | OUTPATIENT
Start: 2020-08-05 | End: 2020-08-15

## 2020-08-04 RX ORDER — POLYETHYLENE GLYCOL 3350 17 G/17G
17 POWDER, FOR SOLUTION ORAL DAILY
Refills: 0 | Status: DISCONTINUED | OUTPATIENT
Start: 2020-08-04 | End: 2020-08-15

## 2020-08-04 RX ORDER — VALPROIC ACID (AS SODIUM SALT) 250 MG/5ML
250 SOLUTION, ORAL ORAL
Refills: 0 | Status: DISCONTINUED | OUTPATIENT
Start: 2020-08-04 | End: 2020-08-15

## 2020-08-04 RX ORDER — SIMVASTATIN 20 MG/1
10 TABLET, FILM COATED ORAL AT BEDTIME
Refills: 0 | Status: DISCONTINUED | OUTPATIENT
Start: 2020-08-04 | End: 2020-08-15

## 2020-08-04 RX ADMIN — CHLORHEXIDINE GLUCONATE 1 APPLICATION(S): 213 SOLUTION TOPICAL at 05:13

## 2020-08-04 RX ADMIN — Medication 116 MILLIGRAM(S): at 05:13

## 2020-08-04 RX ADMIN — FINASTERIDE 5 MILLIGRAM(S): 5 TABLET, FILM COATED ORAL at 12:09

## 2020-08-04 RX ADMIN — ENOXAPARIN SODIUM 40 MILLIGRAM(S): 100 INJECTION SUBCUTANEOUS at 12:09

## 2020-08-04 RX ADMIN — Medication 250 MILLIGRAM(S): at 05:13

## 2020-08-04 RX ADMIN — Medication 250 MILLIGRAM(S): at 17:19

## 2020-08-04 RX ADMIN — SIMVASTATIN 10 MILLIGRAM(S): 20 TABLET, FILM COATED ORAL at 21:18

## 2020-08-04 NOTE — DISCHARGE NOTE NURSING/CASE MANAGEMENT/SOCIAL WORK - NSDCPEEMAIL_GEN_ALL_CORE
[facility] for Tobacco Control email [email or web address]

## 2020-08-04 NOTE — H&P ADULT - MOTOR
Motor -                      LEFT    UE - ShAB 5/5, EF 5/5, EE 5/5, WE 5/5,  WNL                    RIGHT UE - ShAB 5/5, EF 5/5, EE 5/5, WE 5/5,  WNL                    LEFT    LE - HF 5/5, KE 5/5, DF 5/5, PF 5/5                    RIGHT LE - HF 5/5, KE 5/5, DF 5/5, PF 5/5

## 2020-08-04 NOTE — H&P ADULT - MENTAL STATUS
Cognitive -             Orientation: Awake, Alert, AAO to self, place, date, year, situation            Attention:  able to perform serial 7's and spell "world" backwards            Memory: Recent memory intact; 2/3 on delayed recall            Thought: process, content appropriate     Speech - Fluent, Comprehensible, No dysarthria, No aphasia

## 2020-08-04 NOTE — H&P ADULT - NSHPREVIEWOFSYSTEMS_GEN_ALL_CORE
REVIEW OF SYSTEMS  Constitutional: No fever, No Chills, No fatigue  HEENT: No eye pain, No visual disturbances, No difficulty hearing, No Dysphagia   Pulm: No cough,  No shortness of breath  Cardio: No chest pain, No palpitations  GI:  No abdominal pain, No nausea, No vomiting, No diarrhea, No constipation, No incontinence, Last Bowel Movement on   : No dysuria, No frequency, No hematuria, No incontinence  Neuro: No headaches, No memory loss, No loss of strength, No numbness, No tremors  Skin: No itching, No rashes, No lesions   Endo: No temperature intolerance  MSK: No joint pain, No joint swelling, No muscle pain, No Neck or back pain  Psych:  No depression, No anxiety REVIEW OF SYSTEMS  Constitutional: No fever, No Chills  HEENT: No visual disturbances, No difficulty hearing  Pulm: No cough,  No wheezing, No shortness of breath  Cardio: No chest pain, No palpitations  GI:  No abdominal pain, No nausea, No vomiting, No diarrhea, No constipation, No incontinence, Last Bowel Movement on 8/3  : No dysuria, No frequency, No incontinence  Neuro: (+) spasms in right UE & LE and left UE. No headaches, No numbness  MSK: No joint pain

## 2020-08-04 NOTE — PROGRESS NOTE ADULT - SUBJECTIVE AND OBJECTIVE BOX
Subjective: Patient said that he was feeling well last night and this morning. His only concern was spasms in his right arm, in the same location as his previous seizures.  However, these spasms felt much less severe than the seizures. These spasms happened a few times over night, but were not present currently. He denied any pain, fever, weakness, numbness, tingling, loss of consciousness, or involuntary movement besides the aforementioned spasms.    Current Medications:  -Lovenox 40 mg QD  -Chlorhexidine 4% QD  -Finasteride 5 mg QD  -Simvastatin 10 mg QD  -Polyethylene Glycol 17g QD, PRN for constipation  -Lorazepam 1 mg PRN for GTC Seizures > 2 minutes    Vital Signs: Temp 97.8 F, HR 54, /71, RR 18, SpO2 94 on RA    Neuro Exam:  -Mental Status: Alert and Oriented to person, place, month, and president. Registered 3/3 objects. Spelled "world" backwards as d-l-o-r-w. Recalled 2/3 objects. Repeated "no ifs, ands, or buts"  but dropped the s on some of the words. Followed command of touching left ear with right finger. Named pen and notepad  -Cranial Nerves:   CN II - PERRL, impairment in nasal visual fields of left eye  CN III, IV, VI: EOMI  CN V: Sensation intact to light touch symmetrically in V1, V2, and V3 distributions. Symmetric engagement of muscles of mastication  CN VII: Smile was asymmetric, with right-sided droop. Flattening of nasolabial fold on right side.  CN VIII: Hearing grossly intact and symmetric  CN IX, X: Uvula Midline  CN XI: 5/5 strength in SCM and Trapezius  CN XII: Tongue Midline, no atrophy   -Motor: Normal muscle bulk and tone, no pronator drift, 5/5 strength in deltoids, biceps, and quadriceps  -Sensory: Sensation to light touch symmetrically intact on forearm and leg  -Reflexes:   -Coordination: Normal Finger to Nose test bilaterally  -Gait: Negative Romberg. Gait was slow but normal. However, was unable to walk on heels, tip toes, or walk with tandem gait Subjective: Patient said that he was feeling well last night and this morning. His only concern was spasms in his right arm, in the same location as his previous seizures.  However, these spasms felt much less severe than the seizures. These spasms happened a few times over night, but were not present currently. He denied any pain, fever, weakness, numbness, tingling, loss of consciousness, or involuntary movement besides the aforementioned spasms.    MEDICATIONS  (STANDING):  chlorhexidine 4% Liquid 1 Application(s) Topical <User Schedule>  enoxaparin Injectable 40 milliGRAM(s) SubCutaneous daily  finasteride 5 milliGRAM(s) Oral daily  lidocaine 1% Injectable 20 milliLiter(s) Local Injection once  simvastatin 10 milliGRAM(s) Oral at bedtime  valproic acid 250 milliGRAM(s) Oral two times a day    MEDICATIONS  (PRN):  hydrocortisone sodium succinate Injectable 100 milliGRAM(s) IV Push once PRN Infusion reaction  LORazepam   Injectable 1 milliGRAM(s) IV Push once PRN for gtc seizure > 2 minutes  polyethylene glycol 3350 17 Gram(s) Oral daily PRN Constipation  sodium chloride 0.9% lock flush 10 milliLiter(s) IV Push every 1 hour PRN Pre/post blood products, medications, blood draw, and to maintain line patency    Vital Signs (24 Hrs):  T(C): 36.4 (08-04-20 @ 08:34), Max: 36.9 (08-03-20 @ 15:26)  HR: 85 (08-04-20 @ 08:34) (54 - 85)  BP: 131/67 (08-04-20 @ 08:34) (120/58 - 147/61)  RR: 18 (08-04-20 @ 08:34) (18 - 18)  SpO2: 96% (08-04-20 @ 08:34) (94% - 99%)  Wt(kg): --  Daily     Daily     I&O's Summary    03 Aug 2020 07:01  -  04 Aug 2020 07:00  --------------------------------------------------------  IN: 1240 mL / OUT: 625 mL / NET: 615 mL        Neuro Exam:  -Mental Status: Alert and Oriented to person, place, month, and president. Registered 3/3 objects. Spelled "world" backwards as d-l-o-r-w. Recalled 2/3 objects. Repeated "no ifs, ands, or buts"  but dropped the s on some of the words. Followed command of touching left ear with right finger. Named pen and notepad  -Cranial Nerves:   CN II - PERRL, impairment in nasal visual fields of left eye  CN III, IV, VI: EOMI  CN V: Sensation intact to light touch symmetrically in V1, V2, and V3 distributions. Symmetric engagement of muscles of mastication  CN VII: Smile was asymmetric, with right-sided droop. Flattening of nasolabial fold on right side.  CN VIII: Hearing grossly intact and symmetric  CN IX, X: Uvula Midline  CN XI: 5/5 strength in SCM and Trapezius  CN XII: Tongue Midline, no atrophy   -Motor: Normal muscle bulk and tone, no pronator drift, 5/5 strength in deltoids, biceps, and quadriceps  -Sensory: Sensation to light touch symmetrically intact on forearm and leg  -Reflexes:   -Coordination: Normal Finger to Nose test bilaterally  -Gait: Negative Romberg. Gait was slow but normal. However, was unable to walk on heels, tip toes, or walk with tandem gait

## 2020-08-04 NOTE — PROGRESS NOTE ADULT - ASSESSMENT
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 Current physical exam revealed left visual field impairment of left eye, asymmetric smile and flattening of right nasolabial fold, and unable to walk on tiptoes, heels, or in tandem    Impression: Right arm twitching 2/2 LGI1 AIE    Plan:  Pending DC to rehab

## 2020-08-04 NOTE — PROGRESS NOTE ADULT - ASSESSMENT
74 yo male PMHx BPH, HTN, and HLD presents to ED with intermittent left upper extremity tonic clonic activity with progression to right upper and lower extremities and multiple falls since June, concerning for seizures. Outpatient MRI brain without contrast demonstrates hyperenhancement of right limbic lobe.     # Bilateral upper extremity tonic clonic activity   # autoimmune encephalitis  # HTN  # HLD  # BPH  # lung nodules and tree in bud opacities    Appreciate neurology care  Follow up serum autoimmune workup  cont proscar  cont statin  holding amlodipine  cont PLEX per neuro 4/5, last dose 7/31  cont depacon   Pending QuantiFeron plus TB; HBV PCR; HCV pending. RNA PCR pending.prior to vstarting Rituxan, .     PCP Dr. Tevin Ovalle  [facility]  [phone]

## 2020-08-04 NOTE — H&P ADULT - NEGATIVE MUSCULOSKELETAL SYMPTOMS
no muscle weakness/no arthralgia/RH dominant. Reports muscle cramps resolved/no stiffness/no arthritis

## 2020-08-04 NOTE — H&P ADULT - NSCORESITESY/N_GEN_A_CORE_RD
Requesting refill of Alprazolam 0.5 mg    LOV  1/20/20  Next OV not scheduled    PDMP reviewed and is due today.    If fine, please sign.  
Yes

## 2020-08-04 NOTE — PROGRESS NOTE ADULT - SUBJECTIVE AND OBJECTIVE BOX
Patient is a 75y old  Male who presents with a chief complaint of limbic encephalitis (04 Aug 2020 12:57)      INTERVAL HPI/OVERNIGHT EVENTS: noted  pt seen and examined  feels well, no complaints      Vital Signs Last 24 Hrs  T(C): 36.8 (04 Aug 2020 12:45), Max: 36.9 (03 Aug 2020 15:26)  T(F): 98.2 (04 Aug 2020 12:45), Max: 98.4 (03 Aug 2020 15:26)  HR: 73 (04 Aug 2020 12:45) (54 - 85)  BP: 119/65 (04 Aug 2020 12:45) (119/65 - 147/61)  BP(mean): --  RR: 18 (04 Aug 2020 12:45) (18 - 18)  SpO2: 97% (04 Aug 2020 12:45) (94% - 97%)    chlorhexidine 4% Liquid 1 Application(s) Topical <User Schedule>  enoxaparin Injectable 40 milliGRAM(s) SubCutaneous daily  finasteride 5 milliGRAM(s) Oral daily  hydrocortisone sodium succinate Injectable 100 milliGRAM(s) IV Push once PRN  lidocaine 1% Injectable 20 milliLiter(s) Local Injection once  LORazepam   Injectable 1 milliGRAM(s) IV Push once PRN  polyethylene glycol 3350 17 Gram(s) Oral daily PRN  simvastatin 10 milliGRAM(s) Oral at bedtime  sodium chloride 0.9% lock flush 10 milliLiter(s) IV Push every 1 hour PRN  valproic acid 250 milliGRAM(s) Oral two times a day      PHYSICAL EXAM:  GENERAL: NAD,   EYES: conjunctiva and sclera clear  ENMT: Moist mucous membranes  NECK: Supple, No JVD, Normal thyroid  CHEST/LUNG: non labored, cta b/l  HEART: Regular rate and rhythm; No murmurs, rubs, or gallops  ABDOMEN: Soft, Nontender, Nondistended; Bowel sounds present  EXTREMITIES:  2+ Peripheral Pulses, No clubbing, cyanosis, or edema  LYMPH: No lymphadenopathy noted  SKIN: No rashes or lesions    Consultant(s) Notes Reviewed:  [x ] YES  [ ] NO  Care Discussed with Consultants/Other Providers [ x] YES  [ ] NO    LABS:                        12.6   7.86  )-----------( 200      ( 03 Aug 2020 06:45 )             38.5     08-03    134<L>  |  96  |  17  ----------------------------<  92  4.5   |  25  |  0.90    Ca    9.1      03 Aug 2020 06:45          CAPILLARY BLOOD GLUCOSE                  RADIOLOGY & ADDITIONAL TESTS:    Imaging Personally Reviewed:  [x ] YES  [ ] NO

## 2020-08-04 NOTE — PROGRESS NOTE ADULT - ATTENDING COMMENTS
overall improved.  s/p rituxan x1 and solumedrol x2  dc to rehab and continue vpa 250 bid with a tapering off schedule post dc from rehab  He will need monthly IVIG to consolidate the response as well as Rituxan every 6 months

## 2020-08-04 NOTE — PROGRESS NOTE ADULT - SUBJECTIVE AND OBJECTIVE BOX
Subjective: Patient seen and examined. No new events except as noted.     REVIEW OF SYSTEMS:    CONSTITUTIONAL: + weakness, fevers or chills  EYES/ENT: No visual changes;  No vertigo or throat pain   NECK: No pain or stiffness  RESPIRATORY: No cough, wheezing, hemoptysis; No shortness of breath  CARDIOVASCULAR: No chest pain or palpitations  GASTROINTESTINAL: No abdominal or epigastric pain. No nausea, vomiting, or hematemesis; No diarrhea or constipation. No melena or hematochezia.  GENITOURINARY: No dysuria, frequency or hematuria  NEUROLOGICAL: No numbness or weakness  SKIN: No itching, burning, rashes, or lesions   All other review of systems is negative unless indicated above.    MEDICATIONS:  MEDICATIONS  (STANDING):  chlorhexidine 4% Liquid 1 Application(s) Topical <User Schedule>  enoxaparin Injectable 40 milliGRAM(s) SubCutaneous daily  finasteride 5 milliGRAM(s) Oral daily  lidocaine 1% Injectable 20 milliLiter(s) Local Injection once  simvastatin 10 milliGRAM(s) Oral at bedtime  valproic acid 250 milliGRAM(s) Oral two times a day      PHYSICAL EXAM:  T(C): 36.4 (08-04-20 @ 08:34), Max: 36.9 (08-03-20 @ 15:26)  HR: 85 (08-04-20 @ 08:34) (54 - 85)  BP: 131/67 (08-04-20 @ 08:34) (120/58 - 147/61)  RR: 18 (08-04-20 @ 08:34) (18 - 18)  SpO2: 96% (08-04-20 @ 08:34) (94% - 99%)  Wt(kg): --  I&O's Summary    03 Aug 2020 07:01  -  04 Aug 2020 07:00  --------------------------------------------------------  IN: 1240 mL / OUT: 625 mL / NET: 615 mL          Appearance: NAD	  HEENT:   Dry oral mucosa, PERRL, EOMI	  Lymphatic: No lymphadenopathy , no edema  Cardiovascular: Normal S1 S2, No JVD, No murmurs , Peripheral pulses palpable 2+ bilaterally  Respiratory: Lungs clear to auscultation, normal effort 	  Gastrointestinal:  Soft, Non-tender, + BS	  Skin: No rashes, No ecchymoses, No cyanosis, warm to touch  Musculoskeletal: Normal range of motion, normal strength  Psychiatry:  Mood & affect appropriate  Ext: No edema      LABS:    CARDIAC MARKERS:                                12.6   7.86  )-----------( 200      ( 03 Aug 2020 06:45 )             38.5     08-03    134<L>  |  96  |  17  ----------------------------<  92  4.5   |  25  |  0.90    Ca    9.1      03 Aug 2020 06:45      proBNP:   Lipid Profile:   HgA1c:   TSH:             TELEMETRY: 	SR    ECG:  	  RADIOLOGY:   DIAGNOSTIC TESTING:  [ ] Echocardiogram:  [ ]  Catheterization:  [ ] Stress Test:    OTHER:

## 2020-08-04 NOTE — PROGRESS NOTE ADULT - PROBLEM SELECTOR PLAN 2
s/p plasmapheresis   Neurology follow up   PT/OT
for plasmapheresis   Follow up
s/p plasmapheresis   Neurology follow up   PT/OT

## 2020-08-04 NOTE — H&P ADULT - NSHPPHYSICALEXAM_GEN_ALL_CORE
Constitutional - NAD, Comfortable  HEENT - NCAT, EOMI  Neck - Supple, No limited ROM  Chest - good chest expansion, good respiratory effort, CTAB   Cardio - warm and well perfused, RRR, no murmur  Abdomen -  Soft, NTND  Extremities - No peripheral edema, No calf tenderness   Neurologic Exam:                    Cognitive -             Orientation: Awake, Alert, AAO to self, place, date, year, situation            Attention:  Days of week, recall 3 objects without cuing            Memory: Recent/ remote memory intact            Thought: process, content appropriate     Speech - Fluent, Comprehensible, No dysarthria, No aphasia      Cranial Nerves - No facial asymmetry, Tongue midline, EOMI, Shoulder shrug intact     Motor -                      LEFT    UE - ShAB 5/5, EF 5/5, EE 5/5, WE 5/5,  WNL                    RIGHT UE - ShAB 5/5, EF 5/5, EE 5/5, WE 5/5,  WNL                    LEFT    LE - HF 5/5, KE 5/5, DF 5/5, PF 5/5                    RIGHT LE - HF 5/5, KE 5/5, DF 5/5, PF 5/5        Sensory - Intact to LT bilateral     Reflexes - DTR _ / 4 , neg Wilks's b/l, neg Babinski's b/l     Coordination - FTN / HTS intact     OculoVestibular -  No nystagmus  Psychiatric - Mood stable, Affect WNL  Skin on admission: Constitutional - NAD, Comfortable  HEENT - NCAT, EOMI  Chest - good chest expansion, good respiratory effort, CTAB   Cardio - warm and well perfused, RRR, no murmur  Abdomen -  Soft, NTND  Extremities - No peripheral edema, No calf tenderness   Neurologic Exam:                    Cognitive -             Orientation: Awake, Alert, AAO to self, place, date, year, situation            Attention:  able to perform serial 7's and spell "world" backwards            Memory: Recent memory intact; 2/3 on delayed recall            Thought: process, content appropriate     Speech - Fluent, Comprehensible, No dysarthria, No aphasia      Cranial Nerves - No facial asymmetry, Tongue midline, EOMI, Shoulder shrug intact     Motor -                      LEFT    UE - ShAB 5/5, EF 5/5, EE 5/5, WE 5/5,  WNL                    RIGHT UE - ShAB 5/5, EF 5/5, EE 5/5, WE 5/5,  WNL                    LEFT    LE - HF 5/5, KE 5/5, DF 5/5, PF 5/5                    RIGHT LE - HF 5/5, KE 5/5, DF 5/5, PF 5/5        Sensory - Intact to LT bilateral     Reflexes - neg Wilks's b/l     OculoVestibular -  No nystagmus  Psychiatric - Mood stable, Affect WNL  Skin on admission: Bruise on left forearm and left knee. No other wounds Constitutional - NAD, Comfortable  HEENT - NCAT, EOMI  Chest - good chest expansion, good respiratory effort, CTAB   Cardio - warm and well perfused, RRR, no murmur  Abdomen -  Soft, NTND  Extremities - No peripheral edema, No calf tenderness   Neurologic Exam:                    Cognitive -             Orientation: Awake, Alert, AAO to self, place, date, year, situation            Attention:  able to perform serial 7's and spell "world" backwards            Memory: Recent memory intact; 2/3 on delayed recall            Thought: process, content appropriate     Speech - Fluent, Comprehensible, No dysarthria, No aphasia      Cranial Nerves - No facial asymmetry, Tongue midline, EOMI, Shoulder shrug intact     Motor -                      LEFT    UE - ShAB 5/5, EF 5/5, EE 5/5, WE 5/5,  WNL                    RIGHT UE - ShAB 5/5, EF 5/5, EE 5/5, WE 5/5,  WNL                    LEFT    LE - HF 5/5, KE 5/5, DF 5/5, PF 5/5                    RIGHT LE - HF 5/5, KE 5/5, DF 5/5, PF 5/5        Sensory - Intact to LT bilateral     Reflexes - neg Wilks's b/l     Coordination - mild dystonic movements with FTN, worse on the left      OculoVestibular -  No nystagmus  Psychiatric - Mood stable, Affect WNL  Skin on admission: Bruise on left forearm and left knee. No other wounds

## 2020-08-04 NOTE — PROGRESS NOTE ADULT - NSHPATTENDINGPLANDISCUSS_GEN_ALL_CORE
pt, neurology resident
pt, neurology resident
neurology team

## 2020-08-04 NOTE — H&P ADULT - NSHPLABSRESULTS_GEN_ALL_CORE
08-03    134<L>  |  96  |  17  ----------------------------<  92  4.5   |  25  |  0.90    Ca    9.1      03 Aug 2020 06:45                          12.6   7.86  )-----------( 200      ( 03 Aug 2020 06:45 )             38.5         IMAGING:  < from: MR Head w/wo IV Cont (07.22.20 @ 18:41) >  IMPRESSION:    Redemonstration of T2 and FLAIR hyperintense signal within the mesial right temporal lobe in the region of the hippocampal and parahippocampal gyri, with associated subtle swelling. Differential diagnosis includes autoimmune encephalitis, herpes encephalitis, low-grade glioblastoma, and sequelae of recentseizure.    No abnormal parenchymal or leptomeningeal enhancement.    No acute intracranial hemorrhage or acute infarction.  < end of copied text >        < from: CT Chest w/ IV Cont (07.23.20 @ 11:08) >  FINDINGS:  CHEST:   LUNGS AND LARGE AIRWAYS: Patent central airways. A 0.5 cm perifissural nodule along the right minor fissure, likely intrapulmonary lymph node. Also a triangular 0.2 cm nodule in the right middle lobe (series 3, image 124), possible intrapulmonary lymph node. Chronic tree-in-bud opacities in the periphery of the right lower lobe. Scattered calcified granulomata.  PLEURA: No pleural effusion.  VESSELS: Thoracic aorta and main pulmonary artery are normal in caliber.  HEART: Heart size is normal. No pericardial effusion. Aortic root and coronary artery atherosclerotic calcifications.  MEDIASTINUM AND JOE: No lymphadenopathy.  CHEST WALL AND LOWER NECK: A right-sided central line terminates in the SVC.    ABDOMEN AND PELVIS:  LIVER: Within normal limits.  BILE DUCTS: Normal caliber.  GALLBLADDER: Within normal limits.  SPLEEN: Within normal limits.  PANCREAS: Within normal limits.  ADRENALS: Within normal limits.  KIDNEYS/URETERS: Within normal limits.    BLADDER: Trabeculated urinary bladder, likely on the basis of chronic bladder outlet obstruction.  REPRODUCTIVE ORGANS: Prostatomegaly measuring 5.4 cm in transverse dimension.    BOWEL: Colonic diverticulosis, concentrated in the sigmoid, without acute diverticulitis. No bowel wall thickening or obstruction. Moderate stool throughout the colon. Appendix is normal.  PERITONEUM: No ascites.  VESSELS: Atherosclerotic change. Duplicated IVC.  RETROPERITONEUM/LYMPH NODES: No lymphadenopathy.    ABDOMINAL WALL: Within normal limits.  BONES: Degenerative changes.    IMPRESSION:   No CT evidence of acute intrathoracic or intra-abdominal pathology. No solid organ lesion or lymphadenopathy.  < end of copied text > 08-03    134<L>  |  96  |  17  ----------------------------<  92  4.5   |  25  |  0.90    Ca    9.1      03 Aug 2020 06:45                          12.6   7.86  )-----------( 200      ( 03 Aug 2020 06:45 )             38.5         IMAGING:  < from: MR Head w/wo IV Cont (07.22.20 @ 18:41) >  IMPRESSION:    Redemonstration of T2 and FLAIR hyperintense signal within the mesial right temporal lobe in the region of the hippocampal and parahippocampal gyri, with associated subtle swelling. Differential diagnosis includes autoimmune encephalitis, herpes encephalitis, low-grade glioblastoma, and sequelae of recent seizure.    No abnormal parenchymal or leptomeningeal enhancement.    No acute intracranial hemorrhage or acute infarction.  < end of copied text >        < from: CT Chest w/ IV Cont (07.23.20 @ 11:08) >  FINDINGS:  CHEST:   LUNGS AND LARGE AIRWAYS: Patent central airways. A 0.5 cm perifissural nodule along the right minor fissure, likely intrapulmonary lymph node. Also a triangular 0.2 cm nodule in the right middle lobe (series 3, image 124), possible intrapulmonary lymph node. Chronic tree-in-bud opacities in the periphery of the right lower lobe. Scattered calcified granulomata.  PLEURA: No pleural effusion.  VESSELS: Thoracic aorta and main pulmonary artery are normal in caliber.  HEART: Heart size is normal. No pericardial effusion. Aortic root and coronary artery atherosclerotic calcifications.  MEDIASTINUM AND JOE: No lymphadenopathy.  CHEST WALL AND LOWER NECK: A right-sided central line terminates in the SVC.    ABDOMEN AND PELVIS:  LIVER: Within normal limits.  BILE DUCTS: Normal caliber.  GALLBLADDER: Within normal limits.  SPLEEN: Within normal limits.  PANCREAS: Within normal limits.  ADRENALS: Within normal limits.  KIDNEYS/URETERS: Within normal limits.    BLADDER: Trabeculated urinary bladder, likely on the basis of chronic bladder outlet obstruction.  REPRODUCTIVE ORGANS: Prostatomegaly measuring 5.4 cm in transverse dimension.    BOWEL: Colonic diverticulosis, concentrated in the sigmoid, without acute diverticulitis. No bowel wall thickening or obstruction. Moderate stool throughout the colon. Appendix is normal.  PERITONEUM: No ascites.  VESSELS: Atherosclerotic change. Duplicated IVC.  RETROPERITONEUM/LYMPH NODES: No lymphadenopathy.    ABDOMINAL WALL: Within normal limits.  BONES: Degenerative changes.    IMPRESSION:   No CT evidence of acute intrathoracic or intra-abdominal pathology. No solid organ lesion or lymphadenopathy.  < end of copied text >      < from: Transthoracic Echocardiogram (07.22.20 @ 13:14) >  Conclusions:  Normal left ventricular systolic function. No segmental  wall motion abnormalities.  < end of copied text >

## 2020-08-04 NOTE — PROGRESS NOTE ADULT - PROBLEM SELECTOR PROBLEM 2
Limbic encephalitis

## 2020-08-04 NOTE — H&P ADULT - EXTREMITIES COMMENTS
calves soft, no pedal edema bialterally  NT  +healing ecchymoses left tibia, left forearm  no joint swelling, no pain with PROm BUE and Le, normal tone  +some dystonia mild noted with left finger tapping

## 2020-08-04 NOTE — PROGRESS NOTE ADULT - PROBLEM SELECTOR PLAN 1
? 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
? 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
? 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
? 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

## 2020-08-04 NOTE — H&P ADULT - ASSESSMENT
(age and gender) with hx of (pertinent PMH) who presented to (acute hospital) on (date of acute admission) with (presenting sx) found to have (rehab diagnosis). Hospital Course complicated by ___. Admitted for multidisciplinary rehab program    Comprehensive Multidisciplinary Rehab Program:  - Gait, ADL, Functional impairments  - CMS Impairment group:   - PT/OT/ SLP 3 hours a day 5 days a week  - NP consult for support and assessment     ***    Mood / Cognition:  - Neuropsych evaluation     Sleep:  - Maintain quiet hours and low stim environment  - Monitor sleep logs  - Melatonin PRN    Pain:  - Tylenol PRN  - avoid sedating meds that may affect cognitive recovery    GI/Bowel:  - Senna 2 tabs daily  - GI ppx:     /Bladder:  - Monitor PVR if no void in 8h; SC for >400 cc  - Toileting schedule q4h    Diet / Dysphagia:    - Diet:   - ongoing SLP assessment  - Nutrition to follow    Skin/ Pressure Injury Prevention:  - assessment on admission   - Incisions:  -Turn Q2hrs in bed while awake, OOB to Chair, PT/OT/SLP     DVT prophylaxis:  -   - SCDs  - Last doppler on ___    Precautions/ Restrictions  - Falls, Cardiac, Seizures, Spine, Hip  - Ortho:      Weight bearing status:     ROM restrictions:   - Lungs: Aspiration, Incentive Spirometer    - COVID PCR:     --------------------------------------------  Outpatient Follow up:    --------------------------------------------      MEDICAL PROGNOSIS: GOOD            REHAB POTENTIAL: GOOD             ESTIMATED DISPOSITION: HOME WITH HOME CARE            ELOS: 10-14 Days   EXPECTED THERAPY:     P.T. 1hr/day       O.T. 1hr/day      S.L.P. 1hr/day     P&O Unnecessary     EXP FREQUENCY: 5 days per 7 day period     PRESCREEN COMPARISION:   I have reviewed the prescreen information and I have found no relevant changes between the preadmission screening and my post admission evaluation     RATIONALE FOR INPATIENT ADMISSION - Patient demonstrates the following: (check all that apply)  [X] Medically appropriate for rehabilitation admission  [X] Has attainable rehab goals with an appropriate initial discharge plan  [X] Has rehabilitation potential (expected to make a significant improvement within a reasonable period of time)   [X] Requires close medical management by a rehab physician, rehab nursing care, Hospitalist and comprehensive interdisciplinary team (including PT, OT, & or SLP, Prosthetics and Orthotics) DENA FARNSWORTH is a 74yo right-handed male with PMHx of BPH, HTN, and HLD who presented to [facility] on 07/21/2020 with intermittent b/l UE and LE spasms and weakness, found to have Limbic Encephalitis s/p PLEX x5 and Rituxan x2. Admitted for multidisciplinary rehab program.    Impairments: Debility 2/2 Limbic Encephalitis  Impairment Codes: 03.9 Other Neurologic      #Comprehensive Multidisciplinary Rehab Program:  - Gait, ADL, Functional impairments  - CMS Impairment group:   - PT/OT/ SLP 3 hours a day 5 days a week  - NP consult for support and assessment     #Limbic encephalitis  - s/p PLEX x5  - s/p Rituxan x2  - rehab, as above  - c/w Valproic acid 250mg BID    #HTN  - restart on home amlodipine 5mg once daily    #HLD  - c/w simvastatin 10mg qhs    #BPH  - c/w finasteride 5mg once daily    #Mood / Cognition:  - Neuropsych evaluation     #Sleep:  - Maintain quiet hours and low stim environment  - Melatonin PRN    #Pain:  - Tylenol PRN  - avoid sedating meds that may affect cognitive recovery    #GI/Bowel:  - Senna 2 tabs daily and Miralax once daily PRN    #/Bladder:  - Monitor PVR if no void in 8h; SC for >400 cc  - Toileting schedule q4h    #Diet / Dysphagia:    - Diet: Regular  - ongoing SLP assessment  - Nutrition to follow    #Skin/ Pressure Injury Prevention:  - assessment on admission   - Incisions:  -Turn Q2hrs in bed while awake, OOB to Chair, PT/OT/SLP     #DVT prophylaxis:  - Lovenox 40mg once daily  - SCDs    #Precautions/ Restrictions  - Falls  - Ortho:      Weight bearing status: as tolerated  - COVID PCR: neg on 7/29 and 7/31    --------------------------------------------  Outpatient Follow up:  Neurology - Dr. Scott  PMD - Dr. Ovalle  --------------------------------------------      MEDICAL PROGNOSIS: GOOD            REHAB POTENTIAL: GOOD             ESTIMATED DISPOSITION: HOME WITH HOME CARE            ELOS: 10-14 Days   EXPECTED THERAPY:     P.T. 1hr/day       O.T. 1hr/day      S.L.P. 1hr/day     P&O Unnecessary     EXP FREQUENCY: 5 days per 7 day period     PRESCREEN COMPARISION:   I have reviewed the prescreen information and I have found no relevant changes between the preadmission screening and my post admission evaluation     RATIONALE FOR INPATIENT ADMISSION - Patient demonstrates the following: (check all that apply)  [X] Medically appropriate for rehabilitation admission  [X] Has attainable rehab goals with an appropriate initial discharge plan  [X] Has rehabilitation potential (expected to make a significant improvement within a reasonable period of time)   [X] Requires close medical management by a rehab physician, rehab nursing care, Hospitalist and comprehensive interdisciplinary team (including PT, OT, & or SLP, Prosthetics and Orthotics) DENA FARNSWORTH is a 76yo right-handed male with PMHx of BPH, HTN, and HLD who presented to [facility] on 07/21/2020 with intermittent b/l UE and LE spasms and weakness, found to have Limbic Encephalitis s/p PLEX x5 and Rituxan x2. Admitted for multidisciplinary rehab program.    Impairments: Debility 2/2 Limbic Encephalitis  Impairment Codes: 03.9 Other Neurologic      #Comprehensive Multidisciplinary Rehab Program:  - Gait, ADL, Functional impairments  - CMS Impairment group:   - PT/OT/ SLP 3 hours a day 5 days a week  - NP consult for support and assessment     #Limbic encephalitis  - s/p PLEX x5  - s/p Rituxan x2  - rehab, as above  - c/w Valproic acid 250mg BID    #HTN  - on amlodipine 5mg once daily at home  - hold for now, as BP well controlled at [facility]; can restart if needed    #HLD  - c/w simvastatin 10mg qhs    #BPH  - c/w finasteride 5mg once daily    #Mood / Cognition:  - Neuropsych evaluation     #Sleep:  - Maintain quiet hours and low stim environment  - Melatonin PRN    #Pain:  - Tylenol PRN  - avoid sedating meds that may affect cognitive recovery    #GI/Bowel:  - Senna 2 tabs daily and Miralax once daily PRN    #/Bladder:  - Monitor PVR if no void in 8h; SC for >400 cc  - Toileting schedule q4h    #Diet / Dysphagia:    - Diet: Regular  - ongoing SLP assessment  - Nutrition to follow    #Skin/ Pressure Injury Prevention:  - assessment on admission   - Incisions:  -Turn Q2hrs in bed while awake, OOB to Chair, PT/OT/SLP     #DVT prophylaxis:  - Lovenox 40mg once daily  - SCDs    #Precautions/ Restrictions  - Falls  - Ortho:      Weight bearing status: as tolerated  - COVID PCR: neg on 7/29 and 7/31    --------------------------------------------  Outpatient Follow up:  Neurology - Dr. Scott  PMD - Dr. Ovalle  --------------------------------------------      MEDICAL PROGNOSIS: GOOD            REHAB POTENTIAL: GOOD             ESTIMATED DISPOSITION: HOME WITH HOME CARE            ELOS: 10-14 Days   EXPECTED THERAPY:     P.T. 1hr/day       O.T. 1hr/day      S.L.P. 1hr/day     P&O Unnecessary     EXP FREQUENCY: 5 days per 7 day period     PRESCREEN COMPARISION:   I have reviewed the prescreen information and I have found no relevant changes between the preadmission screening and my post admission evaluation     RATIONALE FOR INPATIENT ADMISSION - Patient demonstrates the following: (check all that apply)  [X] Medically appropriate for rehabilitation admission  [X] Has attainable rehab goals with an appropriate initial discharge plan  [X] Has rehabilitation potential (expected to make a significant improvement within a reasonable period of time)   [X] Requires close medical management by a rehab physician, rehab nursing care, Hospitalist and comprehensive interdisciplinary team (including PT, OT, & or SLP, Prosthetics and Orthotics) DENA FARNSWORTH is a 74yo right-handed male with PMHx of BPH, HTN, and HLD who presented to [facility] on 07/21/2020 with intermittent b/l UE and LE spasms and weakness, 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 Admitted for multidisciplinary rehab program.     Impairments: Debility 2/2 Limbic Encephalitis  Impairment Codes: 03.9 Other Neurologic      #Comprehensive Multidisciplinary Rehab Program:  - Gait, ADL, Functional impairments  - CMS Impairment group:   - PT/OT/ SLP 3 hours a day 5 days a week  - NP consult for support and assessment     #Limbic encephalitis  - s/p PLEX x5  - s/p Rituxan x2  - rehab, as above  - c/w Valproic acid 250mg BID    #HTN  - on amlodipine 5mg once daily at home  - hold for now, as BP well controlled at [facility]; can restart if needed    #HLD  - c/w simvastatin 10mg qhs    #BPH  - c/w finasteride 5mg once daily    #Mood / Cognition:  - Neuropsych evaluation     #Sleep:  - Maintain quiet hours and low stim environment  - Melatonin PRN    #Pain:  - Tylenol PRN  - avoid sedating meds that may affect cognitive recovery    #GI/Bowel:  - Senna 2 tabs daily and Miralax once daily PRN    #/Bladder:  - Monitor PVR if no void in 8h; SC for >400 cc  - Toileting schedule q4h    #Diet / Dysphagia:    - Diet: Regular  - ongoing SLP assessment  - Nutrition to follow    #Skin/ Pressure Injury Prevention:  - assessment on admission   - Incisions:  -Turn Q2hrs in bed while awake, OOB to Chair, PT/OT/SLP     #DVT prophylaxis:  - Lovenox 40mg once daily  - SCDs    #Precautions/ Restrictions  - Falls  - Ortho:      Weight bearing status: as tolerated  - COVID PCR: neg on 7/29 and 7/31    --------------------------------------------  Outpatient Follow up:  Neurology - Dr. Scott  PMD - Dr. Ovalle  --------------------------------------------      MEDICAL PROGNOSIS: GOOD            REHAB POTENTIAL: GOOD             ESTIMATED DISPOSITION: HOME WITH HOME CARE            ELOS: 10-14 Days   EXPECTED THERAPY:     P.T. 1hr/day       O.T. 1hr/day      S.L.P. 1hr/day     P&O Unnecessary     EXP FREQUENCY: 5 days per 7 day period     PRESCREEN COMPARISION:   I have reviewed the prescreen information and I have found no relevant changes between the preadmission screening and my post admission evaluation     RATIONALE FOR INPATIENT ADMISSION - Patient demonstrates the following: (check all that apply)  [X] Medically appropriate for rehabilitation admission  [X] Has attainable rehab goals with an appropriate initial discharge plan  [X] Has rehabilitation potential (expected to make a significant improvement within a reasonable period of time)   [X] Requires close medical management by a rehab physician, rehab nursing care, Hospitalist and comprehensive interdisciplinary team (including PT, OT, & or SLP, Prosthetics and Orthotics) DENA FARNSWORTH is a 74yo right-handed male with PMHx of BPH, HTN, and HLD who presented to [facility] on 07/21/2020 with intermittent b/l UE and LE spasms and weakness, 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 Admitted for multidisciplinary rehab program.     Impairments: Debility 2/2 Limbic Encephalitis  Impairment Codes: 03.9 Other Neurologic      #Comprehensive Multidisciplinary Rehab Program:  - Gait, ADL, Functional impairments  - CMS Impairment group:   - PT/OT/ SLP 3 hours a day 5 days a week  - NP consult for support and assessment     #Limbic encephalitis  - s/p PLEX x5, last session 7/31  - s/p Rituxan x1, on 8/3  - rehab, as above  - c/w Valproic acid 250mg BID; plan to taper once DC from rehab with neuro f/u  - per neuro, will require monthly IVIG and Rituxan every 6 months    #HTN  - on amlodipine 5mg once daily at home  - hold for now, as BP well controlled at [facility]; can restart if needed  - bradycardia resolved; continue to monitor    #HLD  - c/w simvastatin 10mg qhs    #BPH  - c/w finasteride 5mg once daily    #Mood / Cognition:  - Neuropsych evaluation     #Sleep:  - Maintain quiet hours and low stim environment  - Melatonin PRN    #Pain:  - Tylenol PRN  - avoid sedating meds that may affect cognitive recovery    #GI/Bowel:  - Senna 2 tabs daily and Miralax once daily PRN    #/Bladder:  - Monitor PVR if no void in 8h; SC for >400 cc  - Toileting schedule q4h    #Diet / Dysphagia:    - Diet: Regular  - ongoing SLP assessment  - Nutrition to follow    #Skin/ Pressure Injury Prevention:  - assessment on admission   - Incisions:  -Turn Q2hrs in bed while awake, OOB to Chair, PT/OT/SLP     #DVT prophylaxis:  - Lovenox 40mg once daily  - SCDs    #Precautions/ Restrictions  - Falls  - Ortho:      Weight bearing status: as tolerated  - COVID PCR: neg on 7/29 and 7/31    --------------------------------------------  Outpatient Follow up:  Neurology - Dr. Scott  PMD - Dr. Ovalle  --------------------------------------------      MEDICAL PROGNOSIS: GOOD            REHAB POTENTIAL: GOOD             ESTIMATED DISPOSITION: HOME WITH HOME CARE            ELOS: 10-14 Days   EXPECTED THERAPY:     P.T. 1hr/day       O.T. 1hr/day      S.L.P. 1hr/day     P&O Unnecessary     EXP FREQUENCY: 5 days per 7 day period     PRESCREEN COMPARISION:   I have reviewed the prescreen information and I have found no relevant changes between the preadmission screening and my post admission evaluation     RATIONALE FOR INPATIENT ADMISSION - Patient demonstrates the following: (check all that apply)  [X] Medically appropriate for rehabilitation admission  [X] Has attainable rehab goals with an appropriate initial discharge plan  [X] Has rehabilitation potential (expected to make a significant improvement within a reasonable period of time)   [X] Requires close medical management by a rehab physician, rehab nursing care, Hospitalist and comprehensive interdisciplinary team (including PT, OT, & or SLP, Prosthetics and Orthotics) DENA FARNSWORTH is a 76yo right-handed male with PMHx of BPH, HTN, and HLD who presented to [facility] on 07/21/2020 with intermittent b/l UE and LE spasms and weakness, found to have Limbic Encephalitis and temporal seizures s/p PLEX x5 and Rituxan x1. Admitted for multidisciplinary rehab program.     Impairments: Debility 2/2 Limbic Encephalitis  Impairment Codes: 03.9 Other Neurologic      #Comprehensive Multidisciplinary Rehab Program:  - Gait, ADL, Functional impairments  - CMS Impairment group:   - PT/OT/ SLP 3 hours a day 5 days a week  - NP consult for support and assessment     #Limbic encephalitis  - s/p PLEX x5, last session 7/31  - s/p Rituxan x1, on 8/3  - rehab, as above  - c/w Valproic acid 250mg BID; plan to taper once DC from rehab with neuro f/u  - per neuro, will require monthly IVIG and Rituxan every 6 months    #HTN  - on amlodipine 5mg once daily at home  - hold for now, as BP well controlled at [facility]; can restart if needed  - bradycardia resolved; continue to monitor    #HLD  - c/w simvastatin 10mg qhs    #BPH  - c/w finasteride 5mg once daily    #Mood / Cognition:  - Neuropsych evaluation     #Sleep:  - Maintain quiet hours and low stim environment  - Melatonin PRN    #Pain:  - Tylenol PRN  - avoid sedating meds that may affect cognitive recovery    #GI/Bowel:  - Senna 2 tabs daily and Miralax once daily PRN    #/Bladder:  - Monitor PVR if no void in 8h; SC for >400 cc  - Toileting schedule q4h    #Diet / Dysphagia:    - Diet: Regular  - ongoing SLP assessment  - Nutrition to follow    #Skin/ Pressure Injury Prevention:  - assessment on admission   - Incisions:  -Turn Q2hrs in bed while awake, OOB to Chair, PT/OT/SLP     #DVT prophylaxis:  - Lovenox 40mg once daily  - SCDs    #Precautions/ Restrictions  - Falls  - Ortho:      Weight bearing status: as tolerated  - COVID PCR: neg on 7/29 and 7/31    --------------------------------------------  Outpatient Follow up:  Neurology - Dr. Scott  PMD - Dr. Ovalle  --------------------------------------------      MEDICAL PROGNOSIS: GOOD            REHAB POTENTIAL: GOOD             ESTIMATED DISPOSITION: HOME WITH HOME CARE            ELOS: 10-14 Days   EXPECTED THERAPY:     P.T. 1hr/day       O.T. 1hr/day      S.L.P. 1hr/day     P&O Unnecessary     EXP FREQUENCY: 5 days per 7 day period     PRESCREEN COMPARISION:   I have reviewed the prescreen information and I have found no relevant changes between the preadmission screening and my post admission evaluation     RATIONALE FOR INPATIENT ADMISSION - Patient demonstrates the following: (check all that apply)  [X] Medically appropriate for rehabilitation admission  [X] Has attainable rehab goals with an appropriate initial discharge plan  [X] Has rehabilitation potential (expected to make a significant improvement within a reasonable period of time)   [X] Requires close medical management by a rehab physician, rehab nursing care, Hospitalist and comprehensive interdisciplinary team (including PT, OT, & or SLP, Prosthetics and Orthotics) DENA FARNSWORTH is a 76yo right-handed male with PMHx of BPH, HTN, and HLD who presented to [facility] on 07/21/2020 with intermittent b/l UE and LE spasms and weakness, found to have Limbic Encephalitis and temporal seizures s/p PLEX x5 and Rituxan x1. Admitted for multidisciplinary rehab program.     Impairments: Debility 2/2 Limbic Encephalitis  Impairment Codes: 03.9 Other Neurologic      #Comprehensive Multidisciplinary Rehab Program: Gait, ADL, Functional impairments secondary to limbic encephalitis and temporal SZ  - PT/OT 3 hours a day 5 days a week: start 1 1/2 hours each  - NP consult for support and assessment     #Limbic encephalitis  - s/p PLEX x5, last session 7/31  - s/p Rituxan x1, on 8/3  - rehab, as above  - c/w Valproic acid 250mg BID; plan to taper once DC from rehab with neuro f/u  - per neuro, will require monthly IVIG and Rituxan every 6 months    #HTN  - on amlodipine 5mg once daily at home  - hold for now, as BP well controlled at [facility]; can restart if needed  - bradycardia resolved; continue to monitor  -hospitalist consult    #HLD  - c/w simvastatin 10mg qhs    #BPH  - c/w finasteride 5mg once daily  -monitor voiding, PVRs    #Mood / Cognition:  - Neuropsych evaluation     #Sleep:  - Maintain quiet hours and low stim environment  - Melatonin PRN    #Pain:  - Tylenol PRN  - avoid sedating meds that may affect cognitive recovery    #GI/Bowel:  - Senna 2 tabs daily and Miralax once daily PRN    #/Bladder:  - Monitor PVR if no void in 8h; SC for >400 cc  - Toileting schedule q4h    #Diet / Dysphagia:    - Diet: Regular  - Nutrition to follow    #Skin/ Pressure Injury Prevention:  -Turn Q2hrs in bed while awake, OOB to Chair, PT/OT/SLP     #DVT prophylaxis:  - Lovenox 40mg once daily  - SCDs    #Precautions/ Restrictions  - Falls  - Ortho:      Weight bearing status: as tolerated  - COVID PCR: neg on 7/29 and 7/31    --------------------------------------------  Outpatient Follow up:  Neurology - Dr. Scott  PMD - Dr. Ovalle  --------------------------------------------      MEDICAL PROGNOSIS: GOOD            REHAB POTENTIAL: GOOD             ESTIMATED DISPOSITION: HOME WITH HOME CARE            ELOS: 10-14 Days   EXPECTED THERAPY:     P.T. 1.5 hr/day       O.T. 1.5 hr/day      S.L.P. N/A    P&O Unnecessary     EXP FREQUENCY: 5 days per 7 day period     PRESCREEN COMPARISION:   I have reviewed the prescreen information and I have found no relevant changes between the preadmission screening and my post admission evaluation     RATIONALE FOR INPATIENT ADMISSION - Patient demonstrates the following: (check all that apply)  [X] Medically appropriate for rehabilitation admission  [X] Has attainable rehab goals with an appropriate initial discharge plan  [X] Has rehabilitation potential (expected to make a significant improvement within a reasonable period of time)   [X] Requires close medical management by a rehab physician, rehab nursing care, Hospitalist and comprehensive interdisciplinary team (including PT, OT, & or SLP, Prosthetics and Orthotics) DENA FARNSWORTH is a 76yo right-handed male with PMHx of BPH, HTN, and HLD who presented to [facility] on 07/21/2020 with intermittent b/l UE and LE spasms and weakness, found to have Limbic Encephalitis and temporal seizures s/p PLEX x5 and Rituxan x1. Admitted for multidisciplinary rehab program.     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      #Comprehensive Multidisciplinary Rehab Program: Gait, ADL, Functional impairments secondary to limbic encephalitis and temporal SZ  - PT/OT 3 hours a day 5 days a week: start 1 1/2 hours each  - NP consult for support and assessment     #Limbic encephalitis  - s/p PLEX x5, last session 7/31  - s/p Rituxan x1, on 8/3  - rehab, as above  - c/w Valproic acid 250mg BID; plan to taper once DC from rehab with neuro f/u  - per neuro, will require monthly IVIG and Rituxan every 6 months    #HTN  - on amlodipine 5mg once daily at home  - hold for now, as BP well controlled at [facility]; can restart if needed  - bradycardia resolved; continue to monitor  -hospitalist consult    #HLD  - c/w simvastatin 10mg qhs    #BPH  - c/w finasteride 5mg once daily  -monitor voiding, PVRs    #Mood / Cognition:  - Neuropsych evaluation     #Sleep:  - Maintain quiet hours and low stim environment  - Melatonin PRN    #Pain:  - Tylenol PRN  - avoid sedating meds that may affect cognitive recovery    #GI/Bowel:  - Senna 2 tabs daily and Miralax once daily PRN    #/Bladder:  - Monitor PVR if no void in 8h; SC for >400 cc  - Toileting schedule q4h    #Diet / Dysphagia:    - Diet: Regular  - Nutrition to follow    #Skin/ Pressure Injury Prevention:  -Turn Q2hrs in bed while awake, OOB to Chair, PT/OT/SLP     #DVT prophylaxis:  - Lovenox 40mg once daily  - SCDs    #Precautions/ Restrictions  - Falls  - Ortho:      Weight bearing status: as tolerated  - COVID PCR: neg on 7/29 and 7/31    --------------------------------------------  Outpatient Follow up:  Neurology - Dr. Scott  PMD - Dr. Ovalle  --------------------------------------------      MEDICAL PROGNOSIS: GOOD            REHAB POTENTIAL: GOOD             ESTIMATED DISPOSITION: HOME WITH HOME CARE            ELOS: 10-14 Days   EXPECTED THERAPY:     P.T. 1.5 hr/day       O.T. 1.5 hr/day      S.L.P. N/A    P&O Unnecessary     EXP FREQUENCY: 5 days per 7 day period     PRESCREEN COMPARISION:   I have reviewed the prescreen information and I have found no relevant changes between the preadmission screening and my post admission evaluation     RATIONALE FOR INPATIENT ADMISSION - Patient demonstrates the following: (check all that apply)  [X] Medically appropriate for rehabilitation admission  [X] Has attainable rehab goals with an appropriate initial discharge plan  [X] Has rehabilitation potential (expected to make a significant improvement within a reasonable period of time)   [X] Requires close medical management by a rehab physician, rehab nursing care, Hospitalist and comprehensive interdisciplinary team (including PT, OT, & or SLP, Prosthetics and Orthotics)

## 2020-08-04 NOTE — PATIENT PROFILE ADULT - PACKS YRS CALCULATION
[facility]  Heart Transplant  H&P    Patient Name: Molly Smith  MRN: [id number]  Admission Date: 2/28/2018  Attending Physician: Reid Edwards MD  Primary Care Provider: Alice Tim MD  Principal Problem:<principal problem not specified>    Subjective:     History of Present Illness:  70 yo female with a PMHx of WHO I- PAH (currently on remodulin Iv, adempas, bosentan), PFO, chronic hypoxia on 3L at baseline previously, but on 5L NC since 01/2018. Today patient noticed increasing shortness of breath, states that her O2 sat on pulse oximeter was 50-70% while resting. Denies fever, cough, LE edema, flu like symptoms, syncope. States that she had chest pressure associated with palpitations. Currently O2sat is 92% on 6L N[place], no chest pressure, denies palpitations. Has a rhythm strip from cardia, suggests Atrial fibrillation vs AT, currently sinus tachycardia with TWI  Anterolateral leads with STD, inferior leads STD, incomplete RBBB. Troponin elevated to 1.6. 's, CXR no edema.    EPS in the past was negative. Did not tolerate Multaq, on cardizem.    RHC in 09/2017-  on IV remodulin 69 ng/kg/min, tracleer and adempas.  RA: 5/2 (1)  RV: 84/-5  RVEDP: 6     PW: 17/19 (16)  PA: 87/21 (47)  AO_SAT: 95  AO: 115/71 (86)  PA_SAT: 71  SPO2: 97  FICKCI: 3.2800  FICKCO: 5.5500    Currently on 89.2 ng/kg/min of remodulin   Adempas 0.5mg TID  Bosentan 125mg Q12h     Past Medical History:   Diagnosis Date    Allergy     Anticoagulant long-term use     Arthritis     Heart murmur     Pneumonia     Pulmonary hypertension        Past Surgical History:   Procedure Laterality Date    BACK SURGERY         Review of patient's allergies indicates:   Allergen Reactions    Vancomycin      RED MAN SYNDROME    Multaq [dronedarone]        Current Facility-Administered Medications   Medication    bosentan tablet 125 mg    [START ON 3/1/2018] diltiaZEM 24 hr capsule 180 mg    furosemide injection 40  mg    riociguat (ADEMPAS) tablet 0.5 mg    sodium chloride 0.9% flush 3 mL    [START ON 3/1/2018] spironolactone tablet 25 mg    [START ON 3/1/2018] warfarin (COUMADIN) tablet 10 mg     Current Outpatient Prescriptions   Medication Sig    bosentan (TRACLEER) 125 MG Tab Take 1 tablet (125 mg total) by mouth 3 (three) times daily.    CALCIUM CARBONATE (CALCIUM 600 ORAL) Take 2 tablets by mouth once daily.      diltiaZEM (CARDIZEM CD) 180 MG 24 hr capsule Take 1 capsule (180 mg total) by mouth once daily.    furosemide (LASIX) 40 MG tablet Take 1 tablet (40 mg total) by mouth 2 (two) times daily.    riociguat (ADEMPAS) 0.5 mg Tab tablet Take 1 tablet (0.5 mg total) by mouth 3 (three) times daily.    spironolactone (ALDACTONE) 25 MG tablet Take 25 mg by mouth once daily.      traMADol (ULTRAM-ER) 200 MG Tb24 TAKE ONE TABLET BY MOUTH ONCE A DAY AS NEEDED FOR PAIN THANK YOU!    TREPROSTINIL SODIUM (TREPROSTINIL, REMODULIN, PUMP FOR HOME USE) Pt currently on 89.2 ng/kg/min=45 mL/day dosing weight 70.05 kg    ferrous gluconate (FERGON) 324 MG tablet Take 1 tablet (324 mg total) by mouth daily with breakfast.    sodium chloride 0.9% 0.9 % SolP 100 mL with treprostinil 1 mg/mL Soln 9,000,000 ng Inject 5,005.5 ng/min into the vein continuous.    warfarin (COUMADIN) 10 MG tablet Take 1 tablet (10 mg total) by mouth Daily.     Family History     Problem Relation (Age of Onset)    Arthritis Mother, Father    Diabetes Father    Heart disease Father    Hypertension Father        Social History Main Topics    Smoking status: Never Smoker    Smokeless tobacco: Not on file    Alcohol use No      Comment: rarely    Drug use: No    Sexual activity: Not on file     Review of Systems   All other systems reviewed and are negative.    Objective:     Vital Signs (Most Recent):  Temp: 98.8 °F (37.1 °C) (02/28/18 1834)  Pulse: 104 (02/28/18 1914)  Resp: (!) 21 (02/28/18 1914)  BP: (!) 107/55 (02/28/18 1834)  SpO2: (!) 89 %  (02/28/18 1914) Vital Signs (24h Range):  Temp:  [98.8 °F (37.1 °C)-99.2 °F (37.3 °C)] 98.8 °F (37.1 °C)  Pulse:  [102-108] 104  Resp:  [20-32] 21  SpO2:  [81 %-92 %] 89 %  BP: (100-110)/(51-56) 107/55     Patient Vitals for the past 72 hrs (Last 3 readings):   Weight   02/28/18 1548 58.1 kg (128 lb)     Body mass index is 22.67 kg/m².    No intake or output data in the 24 hours ending 02/28/18 2007    Physical Exam   Constitutional: She is oriented to person, place, and time. She appears well-developed and well-nourished. No distress.   HENT:   Head: Normocephalic and atraumatic.   Eyes: No scleral icterus.   Neck: JVD present.   JVD 12cm    Cardiovascular: Regular rhythm.    No murmur heard.  Loud S2  Tachycardic    Pulmonary/Chest: Effort normal. No respiratory distress. She has no wheezes. She has rales.   Abdominal: Soft. There is no tenderness.   Musculoskeletal: She exhibits no edema.   Neurological: She is alert and oriented to person, place, and time.   Skin: Skin is warm.   Psychiatric: She has a normal mood and affect.       Significant Labs:  CBC:    Recent Labs  Lab 02/28/18  1710   WBC 5.27   RBC 4.63   HGB 10.0*   HCT 33.7*      MCV 73*   MCH 21.6*   MCHC 29.7*     BNP:    Recent Labs  Lab 02/28/18  1710   *     CMP:    Recent Labs  Lab 02/28/18  1710   *   CALCIUM 9.2   ALBUMIN 3.9   PROT 7.4      K 3.7   CO2 24      BUN 22   CREATININE 1.2   ALKPHOS 46*   ALT 13   AST 21   BILITOT 0.8      Coagulation:     Recent Labs  Lab 02/28/18  1710   INR 2.2*   APTT 27.6     LDH:  No results for input(s): LDH in the last 72 hours.  Microbiology:  Microbiology Results (last 7 days)     ** No results found for the last 168 hours. **          I have reviewed all pertinent labs within the past 24 hours.    Diagnostic Results:  EKG: I have reviewed all pertinent results/findings within the past 24 hours and my personal findings are: Sinus tachycardia, with STD and TWI in  anterolateral and inferior leads  Incomplete RBBB    Assessment/Plan:     Pulmonary hypertension    68 y/o F with WHO class I Pulmonary HTN, here with acute on chronic respiratory insufficiency. Other differentials include URI, ADHF, unlikely to be URI/pneumonia, and her LV EF is normal, with absence of pulmonary edema therefore I do not think this is left sided ADHF. Elevated troponin noted, likely to be demand ischemia in the setting of SVT, and hypoxia. Currently chest pain free and will trend trop until downtrend.    -Admit to TSU  -C/w remodulin, currently has enough in her cassette to last the night  -Home dose of remodulin is 89.2ng/kg/min  -C/w Adempas 0.5mg TID  -C/w bosentan 125mg Q12h   -Lasix 40mg IVP now   -Repeat troponin until downtrend  -EP consult in the morning to address SVT (P.HTN patients tolerate SVT poorly)   -Echo in the AM   -C/w coumadin and daily INR             Siria Rodriguez MD  Heart Transplant  [facility]   0

## 2020-08-04 NOTE — DISCHARGE NOTE NURSING/CASE MANAGEMENT/SOCIAL WORK - PATIENT PORTAL LINK FT
You can access the FollowMyHealth Patient Portal offered by [facility] by registering at the following website: [email or web address]. By joining [employer]’s FollowMyHealth portal, you will also be able to view your health information using other applications (apps) compatible with our system.

## 2020-08-04 NOTE — H&P ADULT - NSHPSOCIALHISTORY_GEN_ALL_CORE
SOCIAL HISTORY  Smoking -   EtOH -   Marital Status -     FUNCTIONAL HISTORY  Previous Functional Status:  Independent in ambulation, ADL's, transfers prior to hospitalization    Current Functional Status:  Bed mobility:  Transfers:  Gait / ambulation:  ADL's:  Speech: SOCIAL HISTORY  Smoking - Denied  EtOH - Denied      FUNCTIONAL HISTORY  Previous Functional Status:  Independent in ambulation, ADL's, transfers prior to hospitalization  Lives in apartment alone with 3 FRANK with elevators once inside.    Current Functional Status:  Bed mobility: CG 1 person assist  Transfers: CG 1 person assist, but decreased strength and impaired balance   Gait / ambulation: ambulates 40 feet with RW  ADL's: CG with toileting; strength, cognition, and balance interfering with ADLs

## 2020-08-04 NOTE — H&P ADULT - CONSTITUTIONAL COMMENTS
no facial droop no dysarthria. no ataxic speech O x 4. follows 1-2 step commands consistently. good attention simple and sustatined

## 2020-08-04 NOTE — PROGRESS NOTE ADULT - PROVIDER SPECIALTY LIST ADULT
Cardiology
Internal Medicine
Intervent Radiology
Neurology
Rehab Medicine
Rehab Medicine
Transfusion Medicine
Rehab Medicine
Cardiology
Neurology
Neurology
Transfusion Medicine
Cardiology

## 2020-08-04 NOTE — PROGRESS NOTE ADULT - REASON FOR ADMISSION
limbic encephalitis

## 2020-08-04 NOTE — H&P ADULT - HISTORY OF PRESENT ILLNESS
Shawn Alan is 74 y/o RH male with PMHx BPH, HTN, and HLD presents to ED with intermittent left upper extremity spasms and weakness since the first week of June. Patient reports that symptoms have progressed to include his right upper and lower extremity since late June. He states that in the beginning of June, he had pain below his left ear. A couple weeks later, patient started to have stiffness of his left upper extremity that felt like spasms and was accompanied by shaking. He then began to have involvement of his right side with involvement of his right upper and right lower extremity. The shaking is usually followed by weakness, which lasts a few minutes before he regains full function. The weakness in his arms has led him to break dishes multiple times. Patient states that holding a weighted object may precipitate the episodes. He does not know how often the episodes occur, but says they are very frequent when he does his daily activities. He cannot recall exactly when the last one was. The patient has had multiple falls since June, most recently a couple weeks ago that he attributes to loss of balance. He denies syncope, loss of consciousness, tongue biting, and urinary incontinence. Patient denies previous history of a neurological disorder, including seizures. Patient does not ambulate with mechanical assistance at baseline. He lives alone and says that the episodes have not been witnessed.  Patient had an MRI brain without contrast yesterday after going to a private neurologist.   Outside MRI noted: Asymmetric FLAIR hyperintensity in the right limbic lobe involving the hippocampal gyrus. Differential diagnosis includes, but is not limited to, infectious/inflammatory encephalitis, limbic encephalitis and sequela of epilepsy/post ictal edema. Additional mild chronic microvascular changes without acute infarct.    HOSPITAL COURSE:  Patient had symptoms of faciobrachial dystonic seizures with arm and face movements, flat affect, hypomimic. 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  Patient underwent an MRI at this facility which noted:  Redemonstration of T2 and FLAIR hyperintense signal within the mesial right temporal lobe in the region of the hippocampal and parahippocampal gyri, with associated subtle swelling. Differential diagnosis includes autoimmune encephalitis, herpes encephalitis, low-grade glioblastoma, and sequelae of recentseizure. No abnormal parenchymal or leptomeningeal enhancement. No acute intracranial hemorrhage or acute infarction.  EEG performed at this facility noted Electrographical seizures, right temporal, with spread to the left, Occasional sharp wave, focal, right temporal, Bitemporal TIRDA, Intermittent focal slowing, bilateral temporal, Diffuse excess beta activity. LP was performed with no signs of infection, CSF studies for encephalopathy and paraneoplastic panel pending...   Patient discovered to be LGI1 positive(1:160), patient given one dose of rituxan, to continue therapy outpatient  Patient was started on PLEX therapy and completed a 5 session course. He had an infusion of Rituxan on 8/3/20, and Solumedrol 1 gram was infused intravenously on 8/3 and again on 8/4    CT chest/abd/pelvis was performed which noted Patent central airways. A 0.5 cm perifissural nodule along the right minor fissure, likely intrapulmonary lymph node. Also a triangular 0.2 cm nodule in the right middle lobe (series 3, image 124), possible intrapulmonary lymph node. Chronic tree-in-bud opacities in the periphery of the right lower lobe. Scattered calcified granulomata.   Concern was raised as encephalitis may precede tumor progression up to 2 years. Pulmonology was consulted through patient's internal medicine provider who recommended no further need for follow up CT for monitoring and no suspicion of active infection. During admission, patient had episodes of bradycardia which may have been in the setting of limbic encephalitis, other vitals had remained stable.    Patient improved over hospital stay and plan was discussed with him and patient was in agreement. Shawn Alan is a 74yo right-handed male with PMHx of BPH, HTN, and HLD who presented to ED with frequent, intermittent left upper extremity spasms and weakness since the first week of June that have now progressed to include his right upper and lower extremity since late June; interering with daily activities. In the beginning of June, he had pain below his left ear and a couple of weeks later, started to have stiffness of his left upper extremity that felt like spasms and was accompanied by shaking. He then began to have involvement of his right side with involvement of his right upper and right lower extremity. The shaking was usually followed by weakness, which lasts a few minutes before he regains full function. The weakness in his arms has led him to break dishes multiple times, as holding a weighted object may precipitate these episodes. The patient has had multiple falls since June, most recently a couple weeks prior to admission that he attributes to loss of balance. He denied syncope, loss of consciousness, tongue biting, and urinary incontinence. Patient denies previous history of a neurological disorder, including seizures. Patient does not ambulate with mechanical assistance at baseline. He lives alone and says that the episodes have not been witnessed.    Patient had an MRI brain without contrast yesterday after going to a private neurologist. Outside MRI noted: Asymmetric FLAIR hyperintensity in the right limbic lobe involving the hippocampal gyrus. Differential diagnosis includes, but is not limited to, infectious/inflammatory encephalitis, limbic encephalitis and sequela of epilepsy/post ictal edema. Additional mild chronic microvascular changes without acute infarct.    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 He underwent an MRI at [facility], which noted redomonstration of hyperintense signals within medial right temporal lobe with associated subtle swelling. EEG was performed and noted electrographical seizures that was right temporal region with spread to the left. LP was performed with no findings c/w infection; CSF studies for encephalopthy and paraneoplastic panel are pending. On bloodwork, patient was found to be LGI1 positive(1:160) and was started on PLEX on 7/23, now s/p 5 doses, last dose 7/31. In addition, he also received Rituxan (rest to continue as outpatient) on 8/3 and 8/4 with IV Solumedrol.    Of note, patient also had CT chest, abdomen, and pelvis, which showed chronic tree-in-bud opacities. Pulmonology was consulted, as there was concern encephalitis may precede tumor progression up to 2 years. Per pulmonology, no suspicion of active infection or pulmonary neoplastic process; no need for f/u CT imaging. Hospital course was also c/b bradycardia, but VS otherwise were stable.     Patient was evaluated by PM&R and therapy for functional deficits and gait/ ADL impairments and recommended acute rehabilitation. Patient was medically optimized for discharge to  to [facility] on 08/04/2020. Shawn Alan is a 76yo right-handed male with PMHx of BPH, HTN, and HLD who presented to ED with frequent, intermittent left upper extremity spasms and weakness since the first week of June that have now progressed to include his right upper and lower extremity since late June; interfering with daily activities. In the beginning of June, he had pain below his left ear and a couple of weeks later, started to have stiffness of his left upper extremity that felt like spasms and was accompanied by shaking. He then began to have involvement of his right side with involvement of his right upper and right lower extremity. The shaking was usually followed by weakness, which lasts a few minutes before he regains full function. The weakness in his arms has led him to break dishes multiple times, as holding a weighted object may precipitate these episodes. The patient has had multiple falls since June, most recently a couple weeks prior to admission that he attributes to loss of balance. He denied syncope, loss of consciousness, tongue biting, and urinary incontinence. Patient denies previous history of a neurological disorder, including seizures. Patient does not ambulate with mechanical assistance at baseline. He lives alone and says that the episodes have not been witnessed.    Patient had an MRI brain without contrast yesterday after going to a private neurologist. Outside MRI noted: Asymmetric FLAIR hyperintensity in the right limbic lobe involving the hippocampal gyrus. Differential diagnosis includes, but is not limited to, infectious/inflammatory encephalitis, limbic encephalitis and sequela of epilepsy/post ictal edema. Additional mild chronic microvascular changes without acute infarct.    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 He underwent an MRI at [facility], which noted redomonstration of hyperintense signals within medial right temporal lobe with associated subtle swelling. EEG was performed and noted electrographical seizures that was right temporal region with spread to the left. LP was performed with no findings c/w infection; CSF studies for encephalopthy and paraneoplastic panel are pending. On bloodwork, patient was found to be LGI1 positive(1:160) and was started on PLEX on 7/23, now s/p 5 doses, last dose 7/31. In addition, he also received Rituxan (rest to continue as outpatient) on 8/3 and 8/4 with IV Solumedrol.    Of note, patient also had CT chest, abdomen, and pelvis, which showed chronic tree-in-bud opacities. Pulmonology was consulted, as there was concern encephalitis may precede tumor progression up to 2 years. Per pulmonology, no suspicion of active infection or pulmonary neoplastic process; no need for f/u CT imaging. Hospital course was also c/b bradycardia, but VS otherwise were stable.     Patient was evaluated by PM&R and therapy for functional deficits and gait/ ADL impairments and recommended acute rehabilitation. Patient was medically optimized for discharge to  to [facility] on 08/04/2020. Shawn Alan is a 76yo right-handed male with PMHx of  HTN, HLD, and BPH who presented to [facility] 7/21/20 with frequent, intermittent left upper extremity spasms and weakness since the first week of June that progressed to include his right upper and lower extremity since late June, interfering with daily activities. Patient reports left ear pain prior to symptoms followed by stiffness of his left upper extremity that he describes as spasms, accompanied by shaking. He then noted involvement of his right upper and right lower extremity. The shaking was usually followed by weakness, which lasted a few minutes before regaining full function. He's broken dishes multiple times, as holding a weighted object may precipitate these episodes. He's also suffered multiple falls since June, most recently a couple weeks prior to admission that he attributes to loss of balance. He denied any syncope, loss of consciousness, tongue biting, or urinary incontinence, history of a neurological disorder or seizures. He lives alone and says that the episodes have not been witnessed.    Patient had an MRI brain without contrast after going to a private neurologist which found: Asymmetric FLAIR hyperintensity in the right limbic lobe involving the hippocampal gyrus. Differential diagnosis includes, but is not limited to, infectious/inflammatory encephalitis, limbic encephalitis and sequela of epilepsy/post ictal edema. Additional mild chronic microvascular changes without acute infarct.    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 He underwent an MRI at [facility], which noted redemonstration of hyperintense signals within medial right temporal lobe with associated subtle swelling. EEG was performed and noted electrographical seizures in the right temporal region with spread to the left. LP was performed with no findings c/w infection; CSF studies for encephalopthy and paraneoplastic panel are pending. On bloodwork, patient was found to be LGI1 positive(1:160) and was started on PLEX on 7/23, now s/p 5 doses, last dose 7/31. In addition, he also received Rituxan (rest to continue as outpatient) on 8/3 and 8/4 with IV Solumedrol.    Of note, patient also had CT chest, abdomen, and pelvis, which showed chronic tree-in-bud opacities. Pulmonology was consulted, as there was concern encephalitis may precede tumor progression up to 2 years. Per pulmonology, no suspicion of active infection or pulmonary neoplastic process; no need for f/u CT imaging. Hospital course was also c/b bradycardia, but VS otherwise were stable.     Patient was evaluated by PM&R and therapy for functional deficits and gait/ ADL impairments and recommended acute rehabilitation. Patient was medically optimized for discharge to [facility] on 08/04/2020. Shawn Alan is a 76yo right-handed male with PMHx of  HTN, HLD, and BPH who presented to [facility] 7/21/20 with frequent, intermittent left upper extremity spasms and weakness since the first week of June that progressed to include his right upper and lower extremity since late June, interfering with daily activities. Patient reports left ear pain prior to symptoms followed by stiffness of his left upper extremity that he describes as spasms, accompanied by shaking. He then noted involvement of his right upper and right lower extremity. The shaking was usually followed by weakness, which lasted a few minutes before regaining full function. He's broken dishes multiple times, as holding a weighted object may precipitate these episodes. He's also suffered multiple falls since June, most recently a couple weeks prior to admission that he attributes to loss of balance. He denied any syncope, loss of consciousness, tongue biting, or urinary incontinence, history of a neurological disorder or seizures. He lives alone and says that the episodes have not been witnessed.    Patient had an MRI brain without contrast after going to a private neurologist which found: Asymmetric FLAIR hyperintensity in the right limbic lobe involving the hippocampal gyrus. Differential diagnosis includes, but is not limited to, infectious/inflammatory encephalitis, limbic encephalitis and sequela of epilepsy/post ictal edema. Additional mild chronic microvascular changes without acute infarct.    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 He underwent an MRI at [facility], which noted redemonstration of hyperintense signals within medial right temporal lobe with associated subtle swelling. EEG was performed and noted electrographical seizures in the right temporal region with spread to the left. LP was performed with no findings c/w infection; CSF studies for encephalopthy and paraneoplastic panel are pending. On bloodwork, patient was found to be LGI1 positive(1:160) and was started on PLEX on 7/23, now s/p 5 doses, last dose 7/31. In addition, he also received Rituxan (rest to continue as outpatient) on 8/3 with IV Solumedrol.    Of note, patient also had CT chest, abdomen, and pelvis, which showed chronic tree-in-bud opacities. Pulmonology was consulted, as there was concern encephalitis may precede tumor progression up to 2 years. Per pulmonology, no suspicion of active infection or pulmonary neoplastic process; no need for f/u CT imaging. Hospital course was also c/b bradycardia, but VS otherwise were stable.     Patient was evaluated by PM&R and therapy for functional deficits and gait/ ADL impairments and recommended acute rehabilitation. Patient was medically optimized for discharge to [facility] on 08/04/2020.

## 2020-08-04 NOTE — DISCHARGE NOTE NURSING/CASE MANAGEMENT/SOCIAL WORK - NSDCPEWEB_GEN_ALL_CORE
[place] website --- [email or web address] website --- [email or web address]

## 2020-08-04 NOTE — PROGRESS NOTE ADULT - PROBLEM SELECTOR PROBLEM 1
Bradycardia

## 2020-08-04 NOTE — PROGRESS NOTE ADULT - SUBJECTIVE AND OBJECTIVE BOX
no new complaints, continues to have twitching in b/l UE, neck.     REVIEW OF SYSTEMS  Constitutional - No fever,  No fatigue  HEENT - No vertigo, No neck pain  Neurological - No headaches, No memory loss, No loss of strength, No numbness, No tremors  Skin - No rashes, No lesions   Musculoskeletal - No joint pain, No joint swelling, No muscle pain  Psychiatric - No depression, No anxiety    FUNCTIONAL PROGRESS  8/4 PT  Bed Mobility  Bed Mobility Training Rolling/Turning: contact guard;  verbal cues;  supervision;  1 person assist  Bed Mobility Training Scooting: contact guard;  supervision;  verbal cues;  1 person assist  Bed Mobility Training Sit-to-Supine: contact guard;  supervision;  verbal cues;  1 person assist  Bed Mobility Training Supine-to-Sit: contact guard;  verbal cues;  supervision;  1 person assist  Bed Mobility Training Limitations: decreased flexibility;  decreased strength;  impaired balance;  cognitive, decreased safety awareness    Sit-Stand Transfer Training  Transfer Training Sit-to-Stand Transfer: contact guard;  supervision;  verbal cues;  1 person assist;  full weight-bearing  Transfer Training Stand-to-Sit Transfer: contact guard;  verbal cues;  supervision;  1 person assist;  full weight-bearing  Sit-to-Stand Transfer Training Transfer Safety Analysis: decreased balance;  decreased flexibility;  impaired balance;  decreased strength;  cognitive, decreased safety awareness    Gait Training  Gait Training: contact guard;  supervision;  verbal cues;  1 person assist;  full weight-bearing   HHA;  40 ft  Gait Analysis: swing-through gait   decreased madai;  decreased stride length;  decreased step length;  decreased flexibility;  decreased strength;  impaired balance;  cognitive, decreased safety awareness;  40 ft;  HHA    8/3 OT    Sit-Stand Transfer Training  Transfer Training Sit-to-Stand Transfer: contact guard;  verbal cues;  nonverbal cues (demo/gestures);  rolling walker  Transfer Training Stand-to-Sit Transfer: contact guard;  verbal cues;  nonverbal cues (demo/gestures);  rolling walker  Sit-to-Stand Transfer Training Transfer Safety Analysis: decreased cognition;  decreased balance;  decreased strength;  impaired balance;  rolling walker    Therapeutic Exercise  Therapeutic Exercise Detail: Pt performed sit to stand transfer with CGA for safety awareness, functional mobility to bathroom with CGA with RW, pt returned to chair and performed B UE/ LE flexion/extension AROM 2x10, required visual aid to perform AROM WFL.     Fine Motor Coordination Training  Fine Motor Coordination Training Charges: Pt reported his hands feel weaker and wished to perform fine motor tasks. Pt opened and closed water bottle ind x2 and performed hand writing tasks as he needs to write bills at home seated in chair with tray table in front. Fine motor skills present WFL, no signs of fatigue or deficits noted.     Toilet Training  Toilet Training Charges: Pt ambulated to bathroom with CGA with RW, required verbal to navigate with RW and safety awareness. Pt used grab bars to sit and stand.   Toilet Training Assistance: contact guard;  verbal cues;  nonverbal cues (demo/gestures);  cognitive, decreased safety awareness;  impaired balance;  decreased strength    VITALS  T(C): 36.8 (08-04-20 @ 12:45), Max: 36.9 (08-03-20 @ 15:26)  HR: 73 (08-04-20 @ 12:45) (54 - 85)  BP: 119/65 (08-04-20 @ 12:45) (119/65 - 147/61)  RR: 18 (08-04-20 @ 12:45) (18 - 18)  SpO2: 97% (08-04-20 @ 12:45) (94% - 98%)  Wt(kg): --    MEDICATIONS   chlorhexidine 4% Liquid 1 Application(s) <User Schedule>  enoxaparin Injectable 40 milliGRAM(s) daily  finasteride 5 milliGRAM(s) daily  hydrocortisone sodium succinate Injectable 100 milliGRAM(s) once PRN  lidocaine 1% Injectable 20 milliLiter(s) once  LORazepam   Injectable 1 milliGRAM(s) once PRN  polyethylene glycol 3350 17 Gram(s) daily PRN  simvastatin 10 milliGRAM(s) at bedtime  sodium chloride 0.9% lock flush 10 milliLiter(s) every 1 hour PRN  valproic acid 250 milliGRAM(s) two times a day      RECENT LABS - Reviewed                        12.6   7.86  )-----------( 200      ( 03 Aug 2020 06:45 )             38.5     08-03    134<L>  |  96  |  17  ----------------------------<  92  4.5   |  25  |  0.90    Ca    9.1      03 Aug 2020 06:45    ---------------------------------------------------------------------  PHYSICAL EXAM  Constitutional - NAD, Comfortable, in bed   HEENT - NCAT, EOMI  Neck - Supple, No limited ROM  Chest - Breathing comfortably, No wheezing  Cardiovascular - S1S2   Abdomen - Soft   Extremities - No C/C/E, No calf tenderness   Neurologic Exam -                    Cognitive - Awake, Alert, AAO to self, place, date, year, situation     Communication - Fluent, No dysarthria     Cranial Nerves - CN 2-12 intact     Motor -                     LEFT    UE - ShAB 5/5, EF 5/5, EE 5/5, WE 5/5,  5/5                    RIGHT UE - ShAB 5/5, EF 5/5, EE 5/5, WE 5/5,  5/5                    LEFT    LE - HF 5/5, KE 5/5, DF 5/5, PF 5/5                    RIGHT LE - HF 5/5, KE 5/5, DF 5/5, PF 5/5        Sensory - Intact to LT       Balance - WNL Static.   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   Encephalitis - s/p PLEX, Rituxan, x 1, solumedrol x 2.  c/w VPA for sz control, 250 bid, tapering off post dc from rehab  will need monthly IVig, rituxan every 6 months  Pain - Tylenol PRN  BPH - c/w finasteride  HLD - c/w statin  PT/OT - daily therapies for ROM, increased mobility, and decreased debility related to hospitalization.  Balance, gait training, ADLs, transfers, and bracing/assistive devices as needed.    DVT PPX - lovenox  Rehab -   continue PT/OT at bedside  contact guard for ambulation, toilet training   Recommend ACUTE inpatient rehabilitation for the functional and cognitive deficits consisting of 3 hours of therapy/day & 24 hour RN/daily PMR physician for comorbid medical management. Will continue to follow for ongoing rehab needs and recommendations. Patient will be able to tolerate 3 hours a day.  Discussed recommendation with patient, who is amenable to rehab.

## 2020-08-05 ENCOUNTER — TRANSCRIPTION ENCOUNTER (OUTPATIENT)
Age: 75
End: 2020-08-05

## 2020-08-05 LAB
ALBUMIN SERPL ELPH-MCNC: 3.2 G/DL — LOW (ref 3.3–5)
ALP SERPL-CCNC: 30 U/L — LOW (ref 40–120)
ALT FLD-CCNC: 30 U/L — SIGNIFICANT CHANGE UP (ref 10–45)
ANION GAP SERPL CALC-SCNC: 5 MMOL/L — SIGNIFICANT CHANGE UP (ref 5–17)
AST SERPL-CCNC: 10 U/L — SIGNIFICANT CHANGE UP (ref 10–40)
BASOPHILS # BLD AUTO: 0 K/UL — SIGNIFICANT CHANGE UP (ref 0–0.2)
BASOPHILS NFR BLD AUTO: 0 % — SIGNIFICANT CHANGE UP (ref 0–2)
BILIRUB SERPL-MCNC: 0.3 MG/DL — SIGNIFICANT CHANGE UP (ref 0.2–1.2)
BUN SERPL-MCNC: 32 MG/DL — HIGH (ref 7–23)
CALCIUM SERPL-MCNC: 8.5 MG/DL — SIGNIFICANT CHANGE UP (ref 8.4–10.5)
CHLORIDE SERPL-SCNC: 103 MMOL/L — SIGNIFICANT CHANGE UP (ref 96–108)
CO2 SERPL-SCNC: 30 MMOL/L — SIGNIFICANT CHANGE UP (ref 22–31)
CREAT SERPL-MCNC: 1.18 MG/DL — SIGNIFICANT CHANGE UP (ref 0.5–1.3)
EOSINOPHIL # BLD AUTO: 0 K/UL — SIGNIFICANT CHANGE UP (ref 0–0.5)
EOSINOPHIL NFR BLD AUTO: 0 % — SIGNIFICANT CHANGE UP (ref 0–6)
GLUCOSE SERPL-MCNC: 97 MG/DL — SIGNIFICANT CHANGE UP (ref 70–99)
HCT VFR BLD CALC: 34.9 % — LOW (ref 39–50)
HGB BLD-MCNC: 11.4 G/DL — LOW (ref 13–17)
LYMPHOCYTES # BLD AUTO: 1.04 K/UL — SIGNIFICANT CHANGE UP (ref 1–3.3)
LYMPHOCYTES # BLD AUTO: 6 % — LOW (ref 13–44)
MCHC RBC-ENTMCNC: 29.5 PG — SIGNIFICANT CHANGE UP (ref 27–34)
MCHC RBC-ENTMCNC: 32.7 GM/DL — SIGNIFICANT CHANGE UP (ref 32–36)
MCV RBC AUTO: 90.2 FL — SIGNIFICANT CHANGE UP (ref 80–100)
MONOCYTES # BLD AUTO: 1.04 K/UL — HIGH (ref 0–0.9)
MONOCYTES NFR BLD AUTO: 6 % — SIGNIFICANT CHANGE UP (ref 2–14)
NEUTROPHILS # BLD AUTO: 15.22 K/UL — HIGH (ref 1.8–7.4)
NEUTROPHILS NFR BLD AUTO: 86 % — HIGH (ref 43–77)
PLATELET # BLD AUTO: 191 K/UL — SIGNIFICANT CHANGE UP (ref 150–400)
POTASSIUM SERPL-MCNC: 4.6 MMOL/L — SIGNIFICANT CHANGE UP (ref 3.5–5.3)
POTASSIUM SERPL-SCNC: 4.6 MMOL/L — SIGNIFICANT CHANGE UP (ref 3.5–5.3)
PROT SERPL-MCNC: 5.4 G/DL — LOW (ref 6–8.3)
RBC # BLD: 3.87 M/UL — LOW (ref 4.2–5.8)
RBC # FLD: 13.8 % — SIGNIFICANT CHANGE UP (ref 10.3–14.5)
SARS-COV-2 RNA SPEC QL NAA+PROBE: SIGNIFICANT CHANGE UP
SODIUM SERPL-SCNC: 138 MMOL/L — SIGNIFICANT CHANGE UP (ref 135–145)
WBC # BLD: 17.3 K/UL — HIGH (ref 3.8–10.5)
WBC # FLD AUTO: 17.3 K/UL — HIGH (ref 3.8–10.5)

## 2020-08-05 PROCEDURE — 99232 SBSQ HOSP IP/OBS MODERATE 35: CPT

## 2020-08-05 PROCEDURE — 99223 1ST HOSP IP/OBS HIGH 75: CPT

## 2020-08-05 RX ADMIN — Medication 250 MILLIGRAM(S): at 17:10

## 2020-08-05 RX ADMIN — SIMVASTATIN 10 MILLIGRAM(S): 20 TABLET, FILM COATED ORAL at 21:11

## 2020-08-05 RX ADMIN — Medication 250 MILLIGRAM(S): at 06:16

## 2020-08-05 RX ADMIN — FINASTERIDE 5 MILLIGRAM(S): 5 TABLET, FILM COATED ORAL at 12:15

## 2020-08-05 RX ADMIN — ENOXAPARIN SODIUM 40 MILLIGRAM(S): 100 INJECTION SUBCUTANEOUS at 12:15

## 2020-08-05 NOTE — DIETITIAN INITIAL EVALUATION ADULT. - DIET TYPE
on Regular Diet w/ Thin Liquids  Education Provided on Proper Nutrition   Patient Does Not Follow Diet @Home, Consumes 3Meals a Day  & Doesn't Take Vitamin/Supplements @Home

## 2020-08-05 NOTE — DISCHARGE NOTE PROVIDER - HOSPITAL COURSE
Shawn Alan is a 76yo right-handed male with PMHx of  HTN, HLD, and BPH who presented to [facility] 7/21/20 with frequent, intermittent left upper extremity spasms and weakness since the first week of June that progressed to include his right upper and lower extremity since late June, interfering with daily activities. Patient reports left ear pain prior to symptoms followed by stiffness of his left upper extremity that he describes as spasms, accompanied by shaking. He then noted involvement of his right upper and right lower extremity. The shaking was usually followed by weakness, which lasted a few minutes before regaining full function. He's broken dishes multiple times, as holding a weighted object may precipitate these episodes. He's also suffered multiple falls since June, most recently a couple weeks prior to admission that he attributes to loss of balance. He denied any syncope, loss of consciousness, tongue biting, or urinary incontinence, history of a neurological disorder or seizures. He lives alone and says that the episodes have not been witnessed.        Patient had an MRI brain without contrast after going to a private neurologist which found: Asymmetric FLAIR hyperintensity in the right limbic lobe involving the hippocampal gyrus. Differential diagnosis includes, but is not limited to, infectious/inflammatory encephalitis, limbic encephalitis and sequela of epilepsy/post ictal edema. Additional mild chronic microvascular changes without acute infarct.        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 He underwent an MRI at [facility], which noted redemonstration of hyperintense signals within medial right temporal lobe with associated subtle swelling. EEG was performed and noted electrographical seizures in the right temporal region with spread to the left. LP was performed with no findings c/w infection; CSF studies for encephalopthy and paraneoplastic panel are pending. On bloodwork, patient was found to be LGI1 positive(1:160) and was started on PLEX on 7/23, now s/p 5 doses, last dose 7/31. In addition, he also received Rituxan (rest to continue as outpatient) on 8/3 with IV Solumedrol.        Of note, patient also had CT chest, abdomen, and pelvis, which showed chronic tree-in-bud opacities. Pulmonology was consulted, as there was concern encephalitis may precede tumor progression up to 2 years. Per pulmonology, no suspicion of active infection or pulmonary neoplastic process; no need for f/u CT imaging. Hospital course was also c/b bradycardia, but VS otherwise were stable.         Patient was evaluated by PM&R and therapy for functional deficits and gait/ ADL impairments and recommended acute rehabilitation. Patient was medically optimized for discharge to [facility] on 08/04/2020.        Rehab Course significant for _________        All other medical co-morbidities were stable.         Patient tolerated course of inpatient PT/OT/SLP rehab with significant functional improvements and met rehab goals prior to discharge.        Patient was medically cleared on ___  for discharged to ___         Patient will follow up with the following: Shawn Alan is a 74yo right-handed male with PMHx of  HTN, HLD, and BPH who presented to [facility] 7/21/20 with frequent, intermittent left upper extremity spasms and weakness since the first week of June that progressed to include his right upper and lower extremity since late June, interfering with daily activities. Patient reports left ear pain prior to symptoms followed by stiffness of his left upper extremity that he describes as spasms, accompanied by shaking. He then noted involvement of his right upper and right lower extremity. The shaking was usually followed by weakness, which lasted a few minutes before regaining full function. He's broken dishes multiple times, as holding a weighted object may precipitate these episodes. He's also suffered multiple falls since June, most recently a couple weeks prior to admission that he attributes to loss of balance. He denied any syncope, loss of consciousness, tongue biting, or urinary incontinence, history of a neurological disorder or seizures. He lives alone and says that the episodes have not been witnessed.        Patient had an MRI brain without contrast after going to a private neurologist which found: Asymmetric FLAIR hyperintensity in the right limbic lobe involving the hippocampal gyrus. Differential diagnosis includes, but is not limited to, infectious/inflammatory encephalitis, limbic encephalitis and sequela of epilepsy/post ictal edema. Additional mild chronic microvascular changes without acute infarct.        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 He underwent an MRI at [facility], which noted redemonstration of hyperintense signals within medial right temporal lobe with associated subtle swelling. EEG was performed and noted electrographical seizures in the right temporal region with spread to the left. LP was performed with no findings c/w infection; CSF studies for encephalopthy and paraneoplastic panel are pending. On bloodwork, patient was found to be LGI1 positive(1:160) and was started on PLEX on 7/23, now s/p 5 doses, last dose 7/31. In addition, he also received Rituxan (rest to continue as outpatient) on 8/3 with IV Solumedrol.        Of note, patient also had CT chest, abdomen, and pelvis, which showed chronic tree-in-bud opacities. Pulmonology was consulted, as there was concern encephalitis may precede tumor progression up to 2 years. Per pulmonology, no suspicion of active infection or pulmonary neoplastic process; no need for f/u CT imaging. Hospital course was also c/b bradycardia, but VS otherwise were stable.         Patient was evaluated by PM&R and therapy for functional deficits and gait/ ADL impairments and recommended acute rehabilitation. Patient was medically optimized for discharge to [facility] on 08/04/2020.        Rehab Course significant for some urinary frequency, urinalysis negative, stabilized with finasteride and toileting program. He had reduced insight, sequencing, and problem-solving and was recommended for supervision during waking hours, however he declined aside from his health care proxy and her son; was evaluated by psychology who felt he had the capacity to make decisions regarding discharge planning. An EEG was performed as patient seemed more confused prior to discharge, with loss of balance while sitting without fall, both of which were self-limiting. EEG negative for focal/epileptogenic foci. Continue on depakote, with neurology follow up as outpatient. On discharge he is supervision with all ADLs, transfers, ambualtion and will be discharged with home Pt, OT, SLP and caregiver support.        All other medical co-morbidities were stable.         Patient tolerated course of inpatient PT/OT/SLP rehab with significant functional improvements and met rehab goals prior to discharge.        Patient was medically cleared on ___  for discharged to ___         Patient will follow up with the following: Shawn Alan is a 74yo right-handed male with PMHx of  HTN, HLD, and BPH who presented to [facility] 7/21/20 with frequent, intermittent left upper extremity spasms and weakness since the first week of June that progressed to include his right upper and lower extremity since late June, interfering with daily activities. Patient reports left ear pain prior to symptoms followed by stiffness of his left upper extremity that he describes as spasms, accompanied by shaking. He then noted involvement of his right upper and right lower extremity. The shaking was usually followed by weakness, which lasted a few minutes before regaining full function. He's broken dishes multiple times, as holding a weighted object may precipitate these episodes. He's also suffered multiple falls since June, most recently a couple weeks prior to admission that he attributes to loss of balance. He denied any syncope, loss of consciousness, tongue biting, or urinary incontinence, history of a neurological disorder or seizures. He lives alone and says that the episodes have not been witnessed.        Patient had an MRI brain without contrast after going to a private neurologist which found: Asymmetric FLAIR hyperintensity in the right limbic lobe involving the hippocampal gyrus. Differential diagnosis includes, but is not limited to, infectious/inflammatory encephalitis, limbic encephalitis and sequela of epilepsy/post ictal edema. Additional mild chronic microvascular changes without acute infarct.        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 He underwent an MRI at [facility], which noted redemonstration of hyperintense signals within medial right temporal lobe with associated subtle swelling. EEG was performed and noted electrographical seizures in the right temporal region with spread to the left. LP was performed with no findings c/w infection; CSF studies for encephalopthy and paraneoplastic panel are pending. On bloodwork, patient was found to be LGI1 positive(1:160) and was started on PLEX on 7/23, now s/p 5 doses, last dose 7/31. In addition, he also received Rituxan (rest to continue as outpatient) on 8/3 with IV Solumedrol.        Of note, patient also had CT chest, abdomen, and pelvis, which showed chronic tree-in-bud opacities. Pulmonology was consulted, as there was concern encephalitis may precede tumor progression up to 2 years. Per pulmonology, no suspicion of active infection or pulmonary neoplastic process; no need for f/u CT imaging. Hospital course was also c/b bradycardia, but VS otherwise were stable.         Patient was evaluated by PM&R and therapy for functional deficits and gait/ ADL impairments and recommended acute rehabilitation. Patient was medically optimized for discharge to [facility] on 08/04/2020.        Rehab Course significant for some urinary frequency, urinalysis negative, stabilized with finasteride and toileting program. He had reduced insight, sequencing, and problem-solving and was recommended for supervision during waking hours, however he declined aside from his health care proxy and her son; was evaluated by psychology who felt he had the capacity to make decisions regarding discharge planning. An EEG was performed as patient seemed more confused prior to discharge, with loss of balance while sitting without fall, both of which were self-limiting. EEG negative for focal/epileptogenic foci. Continue on depakote, with neurology follow up as outpatient. On discharge he is supervision with all ADLs, transfers, ambualtion and will be discharged with home Pt, OT, SLP and caregiver support.        All other medical co-morbidities were stable. Patient tolerated course of inpatient PT/OT/SLP rehab with significant functional improvements and met rehab goals prior to discharge. Patient was medically cleared on 08/15/2020 for discharged to home.

## 2020-08-05 NOTE — PROGRESS NOTE ADULT - SUBJECTIVE AND OBJECTIVE BOX
Patient is a 75y old  Male who presents with a chief complaint of Limbic encephalitis and temporal seizures (05 Aug 2020 11:32)      HPI:  Shawn Alan is a 74yo right-handed male with PMHx of  HTN, HLD, and BPH who presented to [facility] 7/21/20 with frequent, intermittent left upper extremity spasms and weakness since the first week of June that progressed to include his right upper and lower extremity since late June, interfering with daily activities. Patient reports left ear pain prior to symptoms followed by stiffness of his left upper extremity that he describes as spasms, accompanied by shaking. He then noted involvement of his right upper and right lower extremity. The shaking was usually followed by weakness, which lasted a few minutes before regaining full function. He's broken dishes multiple times, as holding a weighted object may precipitate these episodes. He's also suffered multiple falls since June, most recently a couple weeks prior to admission that he attributes to loss of balance. He denied any syncope, loss of consciousness, tongue biting, or urinary incontinence, history of a neurological disorder or seizures. He lives alone and says that the episodes have not been witnessed.    Patient had an MRI brain without contrast after going to a private neurologist which found: Asymmetric FLAIR hyperintensity in the right limbic lobe involving the hippocampal gyrus. Differential diagnosis includes, but is not limited to, infectious/inflammatory encephalitis, limbic encephalitis and sequela of epilepsy/post ictal edema. Additional mild chronic microvascular changes without acute infarct.    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 He underwent an MRI at [facility], which noted redemonstration of hyperintense signals within medial right temporal lobe with associated subtle swelling. EEG was performed and noted electrographical seizures in the right temporal region with spread to the left. LP was performed with no findings c/w infection; CSF studies for encephalopthy and paraneoplastic panel are pending. On bloodwork, patient was found to be LGI1 positive(1:160) and was started on PLEX on 7/23, now s/p 5 doses, last dose 7/31. In addition, he also received Rituxan (rest to continue as outpatient) on 8/3 with IV Solumedrol.    Of note, patient also had CT chest, abdomen, and pelvis, which showed chronic tree-in-bud opacities. Pulmonology was consulted, as there was concern encephalitis may precede tumor progression up to 2 years. Per pulmonology, no suspicion of active infection or pulmonary neoplastic process; no need for f/u CT imaging. Hospital course was also c/b bradycardia, but VS otherwise were stable.     Patient was evaluated by PM&R and therapy for functional deficits and gait/ ADL impairments and recommended acute rehabilitation. Patient was medically optimized for discharge to [facility] on 08/04/2020. (04 Aug 2020 14:05)      PAST MEDICAL & SURGICAL HISTORY:  Hypertension  HLD (hyperlipidemia)  BPH (benign prostatic hyperplasia)  No significant past surgical history      MEDICATIONS  (STANDING):  enoxaparin Injectable 40 milliGRAM(s) SubCutaneous daily  finasteride 5 milliGRAM(s) Oral daily  simvastatin 10 milliGRAM(s) Oral at bedtime  valproic acid 250 milliGRAM(s) Oral two times a day    MEDICATIONS  (PRN):  acetaminophen   Tablet .. 650 milliGRAM(s) Oral every 6 hours PRN Mild Pain (1 - 3)  polyethylene glycol 3350 17 Gram(s) Oral daily PRN Constipation  senna 2 Tablet(s) Oral at bedtime PRN Constipation      Allergies    No Known Allergies    Intolerances          VITALS  75y  Vital Signs Last 24 Hrs  T(C): 36.8 (05 Aug 2020 08:41), Max: 36.9 (04 Aug 2020 21:14)  T(F): 98.2 (05 Aug 2020 08:41), Max: 98.4 (04 Aug 2020 21:14)  HR: 53 (05 Aug 2020 08:41) (53 - 73)  BP: 115/60 (05 Aug 2020 08:41) (114/58 - 133/67)  BP(mean): --  RR: 15 (05 Aug 2020 08:41) (15 - 18)  SpO2: 96% (05 Aug 2020 08:41) (94% - 97%)  Daily     Daily         RECENT LABS:                          11.4   17.30 )-----------( 191      ( 05 Aug 2020 07:20 )             34.9     08-05    138  |  103  |  32<H>  ----------------------------<  97  4.6   |  30  |  1.18    Ca    8.5      05 Aug 2020 07:20    TPro  5.4<L>  /  Alb  3.2<L>  /  TBili  0.3  /  DBili  x   /  AST  10  /  ALT  30  /  AlkPhos  30<L>  08-05    LIVER FUNCTIONS - ( 05 Aug 2020 07:20 )  Alb: 3.2 g/dL / Pro: 5.4 g/dL / ALK PHOS: 30 U/L / ALT: 30 U/L / AST: 10 U/L / GGT: x                   CAPILLARY BLOOD GLUCOSE

## 2020-08-05 NOTE — DISCHARGE NOTE PROVIDER - PROVIDER TOKENS
PROVIDER:[TOKEN:[[id number]],FOLLOWUP:[1 month]],PROVIDER:[TOKEN:[[id number]],FOLLOWUP:[1 week]]

## 2020-08-05 NOTE — DISCHARGE NOTE PROVIDER - NSDCMRMEDTOKEN_GEN_ALL_CORE_FT
amLODIPine 5 mg oral tablet: 1 tab(s) orally once a day  finasteride 5 mg oral tablet: 1 tab(s) orally once a day  simvastatin 10 mg oral tablet: 1 tab(s) orally once a day (at bedtime)  tiZANidine 2 mg oral capsule: orally prn  valproic acid 250 mg oral capsule: 1 cap orally once a day in the morning  2 cap(s) orally once a day (at bedtime) finasteride 5 mg oral tablet: 1 tab(s) orally once a day  simvastatin 10 mg oral tablet: 1 tab(s) orally once a day (at bedtime)  valproic acid 250 mg oral capsule: 1 cap(s) orally 2 times a day

## 2020-08-05 NOTE — DISCHARGE NOTE PROVIDER - NSDCCPCAREPLAN_GEN_ALL_CORE_FT
PRINCIPAL DISCHARGE DIAGNOSIS  Diagnosis: Limbic encephalitis  Assessment and Plan of Treatment: Follow with your neurologist in 1-2 weeks for possible IVIG infusion. Call your neurologist if you have recurrence of dystonic movements, loss balance, worsening gait or confusion. Continue home therapy for PT, OT, SLP services. Do not drive until cleared by your physician.      SECONDARY DISCHARGE DIAGNOSES  Diagnosis: Seizure  Assessment and Plan of Treatment: continue depakote. Follow with your neurologist    Diagnosis: BPH without urinary obstruction  Assessment and Plan of Treatment: continue finasteride. follow with david JAUREGUI PRINCIPAL DISCHARGE DIAGNOSIS  Diagnosis: Limbic encephalitis  Assessment and Plan of Treatment: Follow with your neurologist in 1-2 weeks for possible IVIG infusion. Call your neurologist if you have recurrence of dystonic movements, loss balance, worsening gait or confusion. Continue home therapy for PT, OT, SLP services. Do not drive until cleared by your physician.      SECONDARY DISCHARGE DIAGNOSES  Diagnosis: Seizure  Assessment and Plan of Treatment: continue depakote. Follow with your neurologist    Diagnosis: BPH without urinary obstruction  Assessment and Plan of Treatment: continue finasteride. follow with your PCP

## 2020-08-05 NOTE — DISCHARGE NOTE PROVIDER - NSDCHHHOMEBOUND_GEN_ALL_CORE
Requires supervison due to deteriorating mental status.../Stroke/TBI (neurological/cognitive impairment)

## 2020-08-05 NOTE — DIETITIAN INITIAL EVALUATION ADULT. - ENERGY NEEDS
Height: 71Inches   Weight: 190lb   Body Mass Index (BMI): 26.5kg/m2   Ideal Body Weight Range: 172lb (+/-10%)   Percent Ideal Body Weight ~110%

## 2020-08-05 NOTE — DISCHARGE NOTE PROVIDER - NSDCHHNEEDSERVICE_GEN_ALL_CORE
Rehabilitation services/Observation and assessment Rehabilitation services/Observation and assessment/Medication teaching and assessment

## 2020-08-05 NOTE — CONSULT NOTE ADULT - SUBJECTIVE AND OBJECTIVE BOX
HPI:  76yo male with hx of HTN, HLD, and BPH presented to [facility] from [facility] for acute rehab after being evaluated for frequent, intermittent left upper extremity spasms and weakness since the first week of June that progressed to include his right upper and lower extremity since late June, interfering with daily activities. Patient reports left ear pain prior to symptoms followed by stiffness of his left upper extremity that he describes as spasms, accompanied by shaking. He then noted involvement of his right upper and right lower extremity. The shaking was usually followed by weakness, which lasted a few minutes before regaining full function. He's broken dishes multiple times, as holding a weighted object may precipitate these episodes. He's also suffered multiple falls since June, most recently a couple weeks prior to admission that he attributes to loss of balance. He denied any syncope, loss of consciousness, tongue biting, or urinary incontinence, history of a neurological disorder or seizures. He lives alone and says that the episodes have not been witnessed.    Patient had an MRI brain without contrast after going to a private neurologist which found: Asymmetric FLAIR hyperintensity in the right limbic lobe involving the hippocampal gyrus. Differential diagnosis includes, but is not limited to, infectious/inflammatory encephalitis, limbic encephalitis and sequela of epilepsy/post ictal edema. Additional mild chronic microvascular changes without acute infarct.    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 He underwent an MRI at [facility], which noted redemonstration of hyperintense signals within medial right temporal lobe with associated subtle swelling. EEG was performed and noted electrographical seizures in the right temporal region with spread to the left. LP was performed with no findings c/w infection; CSF studies for encephalopthy and paraneoplastic panel are pending. On bloodwork, patient was found to be LGI1 positive(1:160) and was started on PLEX on 7/23, now s/p 5 doses, last dose 7/31. In addition, he also received Rituxan (rest to continue as outpatient) on 8/3 with IV Solumedrol.    Of note, patient also had CT chest, abdomen, and pelvis, which showed chronic tree-in-bud opacities. Pulmonology was consulted, as there was concern encephalitis may precede tumor progression up to 2 years. Per pulmonology, no suspicion of active infection or pulmonary neoplastic process; no need for f/u CT imaging. Hospital course was also c/b bradycardia, but VS otherwise were stable.     Patient was evaluated by PM&R and therapy for functional deficits and gait/ ADL impairments and recommended acute rehabilitation. Patient was medically optimized for discharge to [facility] on 08/04/2020. (04 Aug 2020 14:05)      Home Medications:  amLODIPine 5 mg oral tablet: 1 tab(s) orally once a day (22 Jul 2020 00:46)  finasteride 5 mg oral tablet: 1 tab(s) orally once a day (22 Jul 2020 00:46)  simvastatin 10 mg oral tablet: 1 tab(s) orally once a day (at bedtime) (22 Jul 2020 00:46)  tiZANidine 2 mg oral capsule: orally prn (22 Jul 2020 00:46)      PAST MEDICAL & SURGICAL HISTORY:  Hypertension  HLD (hyperlipidemia)  BPH (benign prostatic hyperplasia)  No significant past surgical history      Review of Systems:   CONSTITUTIONAL: No fever, weight loss, or fatigue  EYES: No eye pain, visual disturbances, or discharge  ENMT:  No difficulty hearing, tinnitus, vertigo; No sinus or throat pain  NECK: No pain or stiffness  BREASTS: No pain, masses, or nipple discharge  RESPIRATORY: No cough, wheezing, chills or hemoptysis; No shortness of breath  CARDIOVASCULAR: No chest pain, palpitations, dizziness, or leg swelling  GASTROINTESTINAL: No abdominal or epigastric pain. No nausea, vomiting, or hematemesis; No diarrhea or constipation. No melena or hematochezia.  GENITOURINARY: No dysuria, frequency, hematuria, or incontinence  NEUROLOGICAL: No headaches, memory loss, loss of strength, numbness, or tremors  SKIN: No itching, burning, rashes, or lesions   LYMPH NODES: No enlarged glands  ENDOCRINE: No heat or cold intolerance; No hair loss  MUSCULOSKELETAL: No joint pain or swelling; No muscle, back, or extremity pain  PSYCHIATRIC: No depression, anxiety, mood swings, or difficulty sleeping  HEME/LYMPH: No easy bruising, or bleeding gums  ALLERY AND IMMUNOLOGIC: No hives or eczema    Allergies    No Known Allergies    Intolerances        Social History:     FAMILY HISTORY:   Noncontributory    MEDICATIONS  (STANDING):  enoxaparin Injectable 40 milliGRAM(s) SubCutaneous daily  finasteride 5 milliGRAM(s) Oral daily  simvastatin 10 milliGRAM(s) Oral at bedtime  valproic acid 250 milliGRAM(s) Oral two times a day    MEDICATIONS  (PRN):  acetaminophen   Tablet .. 650 milliGRAM(s) Oral every 6 hours PRN Mild Pain (1 - 3)  polyethylene glycol 3350 17 Gram(s) Oral daily PRN Constipation  senna 2 Tablet(s) Oral at bedtime PRN Constipation      Vital Signs Last 24 Hrs  T(C): 36.8 (05 Aug 2020 08:41), Max: 36.9 (04 Aug 2020 21:14)  T(F): 98.2 (05 Aug 2020 08:41), Max: 98.4 (04 Aug 2020 21:14)  HR: 53 (05 Aug 2020 08:41) (53 - 73)  BP: 115/60 (05 Aug 2020 08:41) (114/58 - 133/67)  BP(mean): --  RR: 15 (05 Aug 2020 08:41) (15 - 18)  SpO2: 96% (05 Aug 2020 08:41) (94% - 97%)  CAPILLARY BLOOD GLUCOSE            PHYSICAL EXAM:  GENERAL: NAD, well-developed  HEAD:  Atraumatic, Normocephalic  EYES: EOMI, PERRLA, conjunctiva and sclera clear  NECK: Supple, No JVD  CHEST/LUNG: Clear to auscultation bilaterally; No wheeze  HEART: Regular rate and rhythm; No murmurs, rubs, or gallops  ABDOMEN: Soft, Nontender, Nondistended; Bowel sounds present  EXTREMITIES:  2+ Peripheral Pulses, No clubbing, cyanosis, or edema  PSYCH: AAOx3  NEUROLOGY: non-focal  SKIN: No rashes or lesions    LABS:                        11.4   17.30 )-----------( 191      ( 05 Aug 2020 07:20 )             34.9     08-05    138  |  103  |  32<H>  ----------------------------<  97  4.6   |  30  |  1.18    Ca    8.5      05 Aug 2020 07:20    TPro  5.4<L>  /  Alb  3.2<L>  /  TBili  0.3  /  DBili  x   /  AST  10  /  ALT  30  /  AlkPhos  30<L>  08-05                RADIOLOGY & ADDITIONAL TESTS:    Imaging Personally Reviewed:    Consultant(s) Notes Reviewed:      Care Discussed with Consultants/Other Providers: HPI:  76yo male with hx of HTN, HLD, and BPH presented to [facility] from [facility] for acute rehab after being evaluated for frequent, intermittent left upper extremity spasms and weakness since the first week of June that progressed to include his right upper and lower extremity since late June, interfering with daily activities. He underwent an MRI at [facility], which noted redemonstration of hyperintense signals within medial right temporal lobe with associated subtle swelling. EEG was performed and noted electrographical seizures in the right temporal region with spread to the left. LP was performed with no findings c/w infection; CSF studies for encephalopthy and paraneoplastic panel are pending. On bloodwork, patient was found to be LGI1 positive(1:160) and was started on PLEX on 7/23, now s/p 5 doses, last dose 7/31. In addition, he also received Rituxan (rest to continue as outpatient) on 8/3 with IV Solumedrol    Pt seen and examined at bedside. No acute events overnight  Pt denies any symptoms of cp, palpitations, sob, abd pain, N/V, fever, chills      Home Medications:  amLODIPine 5 mg oral tablet: 1 tab(s) orally once a day (22 Jul 2020 00:46)  finasteride 5 mg oral tablet: 1 tab(s) orally once a day (22 Jul 2020 00:46)  simvastatin 10 mg oral tablet: 1 tab(s) orally once a day (at bedtime) (22 Jul 2020 00:46)  tiZANidine 2 mg oral capsule: orally prn (22 Jul 2020 00:46)      PAST MEDICAL & SURGICAL HISTORY:  Hypertension  HLD (hyperlipidemia)  BPH (benign prostatic hyperplasia)  No significant past surgical history      Review of Systems:   CONSTITUTIONAL: No fever, weight loss, or fatigue  EYES: No eye pain, visual disturbances, or discharge  ENMT:  No difficulty hearing, tinnitus, vertigo; No sinus or throat pain  NECK: No pain or stiffness  BREASTS: No pain, masses, or nipple discharge  RESPIRATORY: No cough, wheezing, chills or hemoptysis; No shortness of breath  CARDIOVASCULAR: No chest pain, palpitations, dizziness, or leg swelling  GASTROINTESTINAL: No abdominal or epigastric pain. No nausea, vomiting, or hematemesis; No diarrhea or constipation. No melena or hematochezia.  GENITOURINARY: No dysuria, frequency, hematuria, or incontinence  NEUROLOGICAL: No headaches, memory loss, loss of strength, numbness, or tremors  SKIN: No itching, burning, rashes, or lesions   LYMPH NODES: No enlarged glands  ENDOCRINE: No heat or cold intolerance; No hair loss  MUSCULOSKELETAL: No joint pain or swelling; No muscle, back, or extremity pain  PSYCHIATRIC: No depression, anxiety, mood swings, or difficulty sleeping  HEME/LYMPH: No easy bruising, or bleeding gums  ALLERY AND IMMUNOLOGIC: No hives or eczema    Allergies    No Known Allergies    Intolerances        Social History:   Lives at home alone.   Denies smoking, EtOH use    FAMILY HISTORY:  Heart disease in Parents    MEDICATIONS  (STANDING):  enoxaparin Injectable 40 milliGRAM(s) SubCutaneous daily  finasteride 5 milliGRAM(s) Oral daily  simvastatin 10 milliGRAM(s) Oral at bedtime  valproic acid 250 milliGRAM(s) Oral two times a day    MEDICATIONS  (PRN):  acetaminophen   Tablet .. 650 milliGRAM(s) Oral every 6 hours PRN Mild Pain (1 - 3)  polyethylene glycol 3350 17 Gram(s) Oral daily PRN Constipation  senna 2 Tablet(s) Oral at bedtime PRN Constipation      Vital Signs Last 24 Hrs  T(C): 36.8 (05 Aug 2020 08:41), Max: 36.9 (04 Aug 2020 21:14)  T(F): 98.2 (05 Aug 2020 08:41), Max: 98.4 (04 Aug 2020 21:14)  HR: 53 (05 Aug 2020 08:41) (53 - 73)  BP: 115/60 (05 Aug 2020 08:41) (114/58 - 133/67)  BP(mean): --  RR: 15 (05 Aug 2020 08:41) (15 - 18)  SpO2: 96% (05 Aug 2020 08:41) (94% - 97%)  CAPILLARY BLOOD GLUCOSE            PHYSICAL EXAM:  GENERAL: NAD, well-developed  HEAD:  Atraumatic, Normocephalic  EYES: EOMI, PERRLA, conjunctiva and sclera clear  NECK: Supple, No JVD  CHEST/LUNG: Clear to auscultation bilaterally; No wheeze  HEART: Regular rate and rhythm; No murmurs, rubs, or gallops  ABDOMEN: Soft, Nontender, Nondistended; Bowel sounds present  EXTREMITIES:  2+ Peripheral Pulses, No clubbing, cyanosis, or edema  PSYCH: AAOx3  NEUROLOGY: non-focal  SKIN: No rashes or lesions    LABS:                        11.4   17.30 )-----------( 191      ( 05 Aug 2020 07:20 )             34.9     08-05    138  |  103  |  32<H>  ----------------------------<  97  4.6   |  30  |  1.18    Ca    8.5      05 Aug 2020 07:20    TPro  5.4<L>  /  Alb  3.2<L>  /  TBili  0.3  /  DBili  x   /  AST  10  /  ALT  30  /  AlkPhos  30<L>  08-05                RADIOLOGY & ADDITIONAL TESTS:    Imaging Personally Reviewed:    Consultant(s) Notes Reviewed:      Care Discussed with Consultants/Other Providers:

## 2020-08-05 NOTE — PROGRESS NOTE ADULT - COMMENTS
Patient seen in bed. Denies any difficulty sleeping last night, denies any spasticity,m yalgia, no tremors  Had BM last night, NAd

## 2020-08-05 NOTE — PROGRESS NOTE ADULT - ASSESSMENT
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 Admitted for multidisciplinary rehab program.     Impairments: Debility 2/2 Limbic Encephalitis  Impairment Codes: 03.9 Other Neurologic    #Comprehensive Multidisciplinary Rehab Program: Gait, ADL, Functional impairments secondary to limbic encephalitis and temporal SZ  - PT/OT 3 hours a day 5 days a week: start 1 1/2 hours each  - neuropsychology for cognitive evaluaton ordered 8/5    #Limbic encephalitis  - s/p PLEX x5, last session 7/31  - s/p Rituxan x1, on 8/3 and IV solumedrol  - c/w Valproic acid 250mg BID; plan to taper once DC from rehab with neuro f/u  - per neuro, will require monthly IVIG and Rituxan every 6 months    #leukocytosis:  -WBC 17.30 8/5, up from 7.86 8/3  -patient afebrile, does not appear toxic, without new symptomatic complaints . Possibly due to solumedrol. Discussed with hospitalist, will monitor off Abx  CBC in AM 8/6    #PIEDAD  -elevation BUn/Cr 32/1.18  -encourage po fluids  -BMP 8/6    #HTN  - on amlodipine 5mg once daily at home  - (114/58 - 133/67) off medications. continue to monitor    #HLD  - c/w simvastatin 10mg qhs    #BPH  - c/w finasteride 5mg once daily     #Sleep:  - Maintain quiet hours and low stim environment  - Melatonin PRN    #Pain:  - Tylenol PRN  - avoid sedating meds that may affect cognitive recovery    #GI/Bowel:  - Senna 2 tabs daily and Miralax once daily PRN    #Diet / Dysphagia:    - Diet: Regular    #DVT prophylaxis:  - Lovenox 40mg once daily  - SCDs    #Labs:  CBC BMP in AM 8/6    --------------------------------------------  Outpatient Follow up:  Neurology - Dr. Scott  PMD - Dr. Ovalle  -------------------------------------------- DENA FARNSWORTH is a 74yo right-handed male with PMHx of BPH, HTN, and HLD who presented to 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 Admitted for multidisciplinary rehab program.     #Comprehensive Multidisciplinary Rehab Program: Gait, ADL, Functional impairments secondary to limbic encephalitis and temporal SZ  - PT/OT 3 hours a day 5 days a week: start 1 1/2 hours each  - neuropsychology for cognitive evaluaton ordered 8/5    #Limbic encephalitis  - s/p PLEX x5, last session 7/31  - s/p Rituxan x1, on 8/3 and IV solumedrol  - c/w Valproic acid 250mg BID; plan to taper once DC from rehab with neuro f/u  - per neuro, will require monthly IVIG and Rituxan every 6 months    #leukocytosis:  -WBC 17.30 8/5, up from 7.86 8/3  -patient afebrile, does not appear toxic, without new symptomatic complaints . Possibly due to solumedrol. Discussed with hospitalist, will monitor off Abx  CBC in AM 8/6    #PIEDAD  -elevation BUn/Cr 32/1.18  -encourage po fluids  -BMP 8/6    #HTN  - on amlodipine 5mg once daily at home  - (114/58 - 133/67) off medications. continue to monitor    #HLD  - c/w simvastatin 10mg qhs    #BPH  - c/w finasteride 5mg once daily     #Sleep:  - Maintain quiet hours and low stim environment  - Melatonin PRN    #Pain:  - Tylenol PRN  - avoid sedating meds that may affect cognitive recovery    #GI/Bowel:  - Senna 2 tabs daily and Miralax once daily PRN    #Diet / Dysphagia:    - Diet: Regular    #DVT prophylaxis:  - Lovenox 40mg once daily  - SCDs    #Labs:  CBC BMP in AM 8/6    --------------------------------------------  Outpatient Follow up:  Neurology - Dr. Scott  PMD - Dr. Ovalle  --------------------------------------------

## 2020-08-05 NOTE — DIETITIAN INITIAL EVALUATION ADULT. - OTHER INFO
75yr Old Male - Dx: Encephlaitis - Initial Assessment - Diet - Regular Diet w/ Thin Liquids, Denies Food Allergy/Intolerance, Tolerates Diet Well, No Chewing/Swallowing Complications (Per Patient), Consumed 100% of 1st Meal (as Per Documentation), No Pressure Ulcers (as Per Nursing Flow Sheets), No Edema Noted (as Per Nursing Flow Sheets), No Recent Nausea/Vomiting/Diarrhea/Constipation (as Per Patient)

## 2020-08-05 NOTE — DISCHARGE NOTE PROVIDER - NSDCFUADDAPPT_GEN_ALL_CORE_FT
1. After being discharged, please follow up with your PMD, Dr. Ovalle within 1-2 weeks of discharge.  2. Please follow up with Dr. Scott, the neurologist within 1-2 weeks of discharge.  3. Please follow up with Dr. Gusman in approximately 4 weeks for further rehab needs.

## 2020-08-05 NOTE — DISCHARGE NOTE PROVIDER - NSDCFUSCHEDAPPT_GEN_ALL_CORE_FT
DENA FARNSWORTH ; 09/02/2020 ; [facility] [address] [facility] ; 09/02/2020 ; [facility] [address] [facility] ; 09/02/2020 ; [facility] [address] [facility] ; 09/02/2020 ; [facility] [address]

## 2020-08-05 NOTE — DISCHARGE NOTE PROVIDER - CARE PROVIDER_API CALL
Marissa Gusman  Physical Medicine and Rehabilitation  [address] [place] [id number]  Phone: [phone]  Fax: [phone]  Follow Up Time: 1 month    Melvina Scott  NEUROLOGY  [address] [place] [id number]  Phone: [phone]  Fax: [phone]  Follow Up Time: 1 week

## 2020-08-06 LAB
ANION GAP SERPL CALC-SCNC: 6 MMOL/L — SIGNIFICANT CHANGE UP (ref 5–17)
B BURGDOR DNA SPEC QL NAA+PROBE: NEGATIVE — SIGNIFICANT CHANGE UP
BUN SERPL-MCNC: 27 MG/DL — HIGH (ref 7–23)
CALCIUM SERPL-MCNC: 8.3 MG/DL — LOW (ref 8.4–10.5)
CHLORIDE SERPL-SCNC: 100 MMOL/L — SIGNIFICANT CHANGE UP (ref 96–108)
CO2 SERPL-SCNC: 31 MMOL/L — SIGNIFICANT CHANGE UP (ref 22–31)
CREAT SERPL-MCNC: 1.15 MG/DL — SIGNIFICANT CHANGE UP (ref 0.5–1.3)
GLUCOSE SERPL-MCNC: 91 MG/DL — SIGNIFICANT CHANGE UP (ref 70–99)
HCT VFR BLD CALC: 38.3 % — LOW (ref 39–50)
HGB BLD-MCNC: 12.6 G/DL — LOW (ref 13–17)
MCHC RBC-ENTMCNC: 29.7 PG — SIGNIFICANT CHANGE UP (ref 27–34)
MCHC RBC-ENTMCNC: 32.9 GM/DL — SIGNIFICANT CHANGE UP (ref 32–36)
MCV RBC AUTO: 90.3 FL — SIGNIFICANT CHANGE UP (ref 80–100)
NRBC # BLD: 0 /100 WBCS — SIGNIFICANT CHANGE UP (ref 0–0)
PLATELET # BLD AUTO: 216 K/UL — SIGNIFICANT CHANGE UP (ref 150–400)
POTASSIUM SERPL-MCNC: 4.9 MMOL/L — SIGNIFICANT CHANGE UP (ref 3.5–5.3)
POTASSIUM SERPL-SCNC: 4.9 MMOL/L — SIGNIFICANT CHANGE UP (ref 3.5–5.3)
RBC # BLD: 4.24 M/UL — SIGNIFICANT CHANGE UP (ref 4.2–5.8)
RBC # FLD: 13.8 % — SIGNIFICANT CHANGE UP (ref 10.3–14.5)
SODIUM SERPL-SCNC: 137 MMOL/L — SIGNIFICANT CHANGE UP (ref 135–145)
WBC # BLD: 9.14 K/UL — SIGNIFICANT CHANGE UP (ref 3.8–10.5)
WBC # FLD AUTO: 9.14 K/UL — SIGNIFICANT CHANGE UP (ref 3.8–10.5)

## 2020-08-06 PROCEDURE — 99232 SBSQ HOSP IP/OBS MODERATE 35: CPT

## 2020-08-06 PROCEDURE — 96116 NUBHVL XM PHYS/QHP 1ST HR: CPT

## 2020-08-06 RX ADMIN — Medication 250 MILLIGRAM(S): at 17:17

## 2020-08-06 RX ADMIN — SIMVASTATIN 10 MILLIGRAM(S): 20 TABLET, FILM COATED ORAL at 23:04

## 2020-08-06 RX ADMIN — FINASTERIDE 5 MILLIGRAM(S): 5 TABLET, FILM COATED ORAL at 12:14

## 2020-08-06 RX ADMIN — ENOXAPARIN SODIUM 40 MILLIGRAM(S): 100 INJECTION SUBCUTANEOUS at 12:14

## 2020-08-06 RX ADMIN — Medication 250 MILLIGRAM(S): at 06:07

## 2020-08-06 NOTE — CONSULT NOTE ADULT - SUBJECTIVE AND OBJECTIVE BOX
is a 76yo right-handed male with PMHx of  HTN, HLD, and BPH who presented to [facility] 7/21/20 with frequent, intermittent left upper extremity spasms and weakness since the first week of June that progressed to include his right upper and lower extremity since late June, interfering with daily activities. Patient reports left ear pain prior to symptoms followed by stiffness of his left upper extremity that he describes as spasms, accompanied by shaking. He then noted involvement of his right upper and right lower extremity. The shaking was usually followed by weakness, which lasted a few minutes before regaining full function. He's broken dishes multiple times, as holding a weighted object may precipitate these episodes. He's also suffered multiple falls since June, most recently a couple weeks prior to admission that he attributes to loss of balance. He denied any syncope, loss of consciousness, tongue biting, or urinary incontinence, history of a neurological disorder or seizures. He lives alone and says that the episodes have not been witnessed.    Patient had an MRI brain without contrast after going to a private neurologist which found: Asymmetric FLAIR hyperintensity in the right limbic lobe involving the hippocampal gyrus. Differential diagnosis includes, but is not limited to, infectious/inflammatory encephalitis, limbic encephalitis and sequela of epilepsy/post ictal edema. Additional mild chronic microvascular changes without acute infarct.    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 He underwent an MRI at [facility], which noted redemonstration of hyperintense signals within medial right temporal lobe with associated subtle swelling. EEG was performed and noted electrographical seizures in the right temporal region with spread to the left. LP was performed with no findings c/w infection; CSF studies for encephalopthy and paraneoplastic panel are pending. On bloodwork, patient was found to be LGI1 positive(1:160) and was started on PLEX on 7/23, now s/p 5 doses, last dose 7/31. In addition, he also received Rituxan (rest to continue as outpatient) on 8/3 with IV Solumedrol.    Of note, patient also had CT chest, abdomen, and pelvis, which showed chronic tree-in-bud opacities. Pulmonology was consulted, as there was concern encephalitis may precede tumor progression up to 2 years. Per pulmonology, no suspicion of active infection or pulmonary neoplastic process; no need for f/u CT imaging. Hospital course was also c/b bradycardia, but VS otherwise were stable.     Patient was evaluated by PM&R and therapy for functional deficits and gait/ ADL impairments and recommended acute rehabilitation. Patient was medically optimized for discharge to [facility] on 08/04/2020. Pt is a 74 y/o right-handed male with PMHx of  HTN, HLD, and BPH who presented to [facility] 7/21/20 with frequent, intermittent left upper extremity spasms and weakness since the first week of June that progressed to include his right upper and lower extremity since late June, interfering with daily activities. Pt reports left ear pain prior to symptoms followed by stiffness of his left upper extremity that he describes as spasms, accompanied by shaking. He then noted involvement of his right upper and right lower extremity. The shaking was usually followed by weakness, which lasted a few minutes before regaining full function. He's broken dishes multiple times, as holding a weighted object may precipitate these episodes. He's also suffered multiple falls since June, most recently a couple weeks prior to admission that he attributes to loss of balance. He denied any syncope, loss of consciousness, tongue biting, or urinary incontinence, history of a neurological disorder or seizures. He lives alone and says that the episodes have not been witnessed. Pt had an MRI brain without contrast after going to a private neurologist which found: Asymmetric FLAIR hyperintensity in the right limbic lobe involving the hippocampal gyrus. Differential diagnosis includes, but is not limited to, infectious/inflammatory encephalitis, limbic encephalitis and sequela of epilepsy/post ictal edema. Additional mild chronic microvascular changes without acute infarct. 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 He underwent an MRI at [facility], which noted redemonstration of hyperintense signals within medial right temporal lobe with associated subtle swelling. EEG was performed and noted electrographical seizures in the right temporal region with spread to the left. LP was performed with no findings c/w infection; CSF studies for encephalopathy and paraneoplastic panel are pending. On bloodwork, Pt was found to be LGI1 positive(1:160) and was started on PLEX on 7/23, now s/p 5 doses, last dose 7/31. In addition, he also received Rituxan (rest to continue as outpatient) on 8/3 with IV Solumedrol. Of note, Pt also had CT chest, abdomen, and pelvis, which showed chronic tree-in-bud opacities. Pulmonology was consulted, as there was concern encephalitis may precede tumor progression up to 2 years. Per pulmonology, no suspicion of active infection or pulmonary neoplastic process; no need for f/u CT imaging. Hospital course was also c/b bradycardia, but VS otherwise were stable. Pt was evaluated by PM&R and therapy for functional deficits and gait/ ADL impairments and recommended acute rehabilitation. Pt was medically optimized for [facility].  PMHx: As noted above. Current meds: Please see list in Pt’s chart. Social Hx: Pt is  and lives alone. He has a PhD in history and is a retired . He lacks hx of mental illness and substance abuse. Pt is Yazidism but not Scientologist. He enjoys going to the [employer].   Findings: Pt was seen for an initial assessment of his cognitive and emotional functioning. MoCA was administered; Pt’s results (23/30) were in the Mild Impairment range. His scores in mood measures suggested minimal levels of anxiety and depression (GAD7 = 1/21; PHQ9 = 0/27). Pt was alert, closely Ox3 (disoriented to city), and cooperative. Attn/Conc: Simple auditory attention - intact. Sustained auditory attention - omtact. Concentration - impaired. Working memory for calculations –  intact (5/5 serial 7s).	 Language: Pt’s speech was of normal volume, pitch and pace. Naming - intact. Sentence repetition - mildly impaired. Phonemic fluency - moderately impaired. Memory: Encoding of 5 words was intact (5/5); delayed verbal recall – mildly impaired (3/5, improving to 5/5 with cueing). LTM was  mildly impaired for [place] presidents (3/4, improving to 4/4 with cueing). Visual memory – intact. Visuospatial: Visuomotor integration – impaired for copy of a 3D figure, sloppiness and retracing was noted. Executive Functions: Set-shifting - intact. Organizational skills - intact. Abstract reasoning - intact. Initiation - moderately impaired. Self-awareness - fair. Verbal problem solving –  mildly impaired. Emotional functioning: Affect - constricted. Mood - euthymic ("good"). On mood measures he reported experiencing occasional irritability. Thought processes were goal-directed.  No abnormal thought contents were observed.  Pt denied any AH/VH. Pt also denied SI/HI/I/P. Insight - fair. Judgment - fair.

## 2020-08-06 NOTE — PROGRESS NOTE ADULT - SUBJECTIVE AND OBJECTIVE BOX
Tho Brewer M.D. 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 No acute events overnight.  Pt denies cp, palpitations, sob, abd pain, N/V, fever, chills.    ROS:  All other review of systems negative    Allergies    No Known Allergies    Intolerances        MEDICATIONS  (STANDING):  enoxaparin Injectable 40 milliGRAM(s) SubCutaneous daily  finasteride 5 milliGRAM(s) Oral daily  simvastatin 10 milliGRAM(s) Oral at bedtime  valproic acid 250 milliGRAM(s) Oral two times a day    MEDICATIONS  (PRN):  acetaminophen   Tablet .. 650 milliGRAM(s) Oral every 6 hours PRN Mild Pain (1 - 3)  polyethylene glycol 3350 17 Gram(s) Oral daily PRN Constipation  senna 2 Tablet(s) Oral at bedtime PRN Constipation      Vital Signs Last 24 Hrs  T(C): 36.7 (06 Aug 2020 09:14), Max: 36.9 (05 Aug 2020 21:09)  T(F): 98 (06 Aug 2020 09:14), Max: 98.4 (05 Aug 2020 21:09)  HR: 61 (06 Aug 2020 09:14) (60 - 61)  BP: 110/64 (06 Aug 2020 09:14) (109/60 - 110/64)  BP(mean): --  RR: 16 (06 Aug 2020 09:14) (15 - 16)  SpO2: 95% (06 Aug 2020 09:14) (94% - 95%)  CAPILLARY BLOOD GLUCOSE        I&O's Summary      PHYSICAL EXAM:  GENERAL: NAD, well-developed  CHEST/LUNG: Clear to auscultation bilaterally; No wheeze  HEART: Regular rate and rhythm; No murmurs, rubs, or gallops  ABDOMEN: Soft, Nontender, Nondistended; Bowel sounds present  EXTREMITIES:  2+ Peripheral Pulses, No clubbing, cyanosis, or edema  PSYCH: AAOx3  NEUROLOGY: non-focal  SKIN: No rashes or lesions    LABS:                        12.6   9.14  )-----------( 216      ( 06 Aug 2020 08:30 )             38.3     08-06    137  |  100  |  27<H>  ----------------------------<  91  4.9   |  31  |  1.15    Ca    8.3<L>      06 Aug 2020 08:30    TPro  5.4<L>  /  Alb  3.2<L>  /  TBili  0.3  /  DBili  x   /  AST  10  /  ALT  30  /  AlkPhos  30<L>  08-05              RADIOLOGY & ADDITIONAL TESTS:  Results Reviewed:   Imaging Personally Reviewed:  Electrocardiogram Personally Reviewed:    COORDINATION OF CARE:  Care Discussed with Consultants/Other Providers [Y/N]:  Prior or Outpatient Records Reviewed [Y/N]:

## 2020-08-06 NOTE — PROGRESS NOTE ADULT - SUBJECTIVE AND OBJECTIVE BOX
DAILY PROGRESS NOTE:  HPI:  Shawn Alan is a 74yo right-handed male with PMHx of  HTN, HLD, and BPH who presented to [facility] 20 with frequent, intermittent left upper extremity spasms and weakness since the first week of  that progressed to include his right upper and lower extremity since late , interfering with daily activities. Patient reports left ear pain prior to symptoms followed by stiffness of his left upper extremity that he describes as spasms, accompanied by shaking. He then noted involvement of his right upper and right lower extremity. The shaking was usually followed by weakness, which lasted a few minutes before regaining full function. He's broken dishes multiple times, as holding a weighted object may precipitate these episodes. He's also suffered multiple falls since , most recently a couple weeks prior to admission that he attributes to loss of balance. He denied any syncope, loss of consciousness, tongue biting, or urinary incontinence, history of a neurological disorder or seizures. He lives alone and says that the episodes have not been witnessed.    Patient had an MRI brain without contrast after going to a private neurologist which found: Asymmetric FLAIR hyperintensity in the right limbic lobe involving the hippocampal gyrus. Differential diagnosis includes, but is not limited to, infectious/inflammatory encephalitis, limbic encephalitis and sequela of epilepsy/post ictal edema. Additional mild chronic microvascular changes without acute infarct.    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 He underwent an MRI at [facility], which noted redemonstration of hyperintense signals within medial right temporal lobe with associated subtle swelling. EEG was performed and noted electrographical seizures in the right temporal region with spread to the left. LP was performed with no findings c/w infection; CSF studies for encephalopthy and paraneoplastic panel are pending. On bloodwork, patient was found to be LGI1 positive(1:160) and was started on PLEX on , now s/p 5 doses, last dose . In addition, he also received Rituxan (rest to continue as outpatient) on 8/3 with IV Solumedrol.    Of note, patient also had CT chest, abdomen, and pelvis, which showed chronic tree-in-bud opacities. Pulmonology was consulted, as there was concern encephalitis may precede tumor progression up to 2 years. Per pulmonology, no suspicion of active infection or pulmonary neoplastic process; no need for f/u CT imaging. Hospital course was also c/b bradycardia, but VS otherwise were stable.     Patient was evaluated by PM&R and therapy for functional deficits and gait/ ADL impairments and recommended acute rehabilitation. Patient was medically optimized for [facility] on 2020. (04 Aug 2020 14:05)      Subjective:  Patient was seen and examined at the bedside this AM. No acute overnight events. Still with occasional muscle spasms that move to the right. States he is otherwise doing well, no complaints reported.      Physical Exam:  Vital Signs Last 24 Hrs  T(C): 36.7 (06 Aug 2020 09:14), Max: 36.9 (05 Aug 2020 21:09)  T(F): 98 (06 Aug 2020 09:14), Max: 98.4 (05 Aug 2020 21:09)  HR: 61 (06 Aug 2020 09:14) (60 - 61)  BP: 110/64 (06 Aug 2020 09:14) (109/60 - 110/64)  RR: 16 (06 Aug 2020 09:14) (15 - 16)  SpO2: 95% (06 Aug 2020 09:14) (94% - 95%)      Physical Exam:   · Constitutional	detailed exam	  · Constitutional Comments	alert, no facial droop or dysarthria O x 2	  · Respiratory	detailed exam	  · Respiratory Details	respirations non-labored; clear to auscultation bilaterally	  · Cardiovascular	detailed exam	  · Cardiovascular Details	regular rate and rhythm	  · Gastrointestinal	detailed exam	  · GI Normal	soft; nontender; bowel sounds normal	  · Extremities	detailed exam	  · Extremities Comments	BUE and LE normal tone and ROM motor 5/5 BUE and LE +mild dystonia left UE elicited with rapid finger movements and activation multiple muscle groups no calf wellint +soft, NT	      Recent Labs/Imagin.6   9.14  )-----------( 216      ( 06 Aug 2020 08:30 )             38.3     08-06    137  |  100  |  27<H>  ----------------------------<  91  4.9   |  31  |  1.15    Ca    8.3<L>      06 Aug 2020 08:30    TPro  5.4<L>  /  Alb  3.2<L>  /  TBili  0.3  /  DBili  x   /  AST  10  /  ALT  30  /  AlkPhos  30<L>  -      Medications:  acetaminophen   Tablet .. 650 milliGRAM(s) Oral every 6 hours PRN  enoxaparin Injectable 40 milliGRAM(s) SubCutaneous daily  finasteride 5 milliGRAM(s) Oral daily  polyethylene glycol 3350 17 Gram(s) Oral daily PRN  senna 2 Tablet(s) Oral at bedtime PRN  simvastatin 10 milliGRAM(s) Oral at bedtime  valproic acid 250 milliGRAM(s) Oral two times a day DAILY PROGRESS NOTE:  HPI:  Shawn Alan is a 74yo right-handed male with PMHx of  HTN, HLD, and BPH who presented to [facility] 20 with frequent, intermittent left upper extremity spasms and weakness since the first week of  that progressed to include his right upper and lower extremity since late , interfering with daily activities. Patient reports left ear pain prior to symptoms followed by stiffness of his left upper extremity that he describes as spasms, accompanied by shaking. He then noted involvement of his right upper and right lower extremity. The shaking was usually followed by weakness, which lasted a few minutes before regaining full function. He's broken dishes multiple times, as holding a weighted object may precipitate these episodes. He's also suffered multiple falls since , most recently a couple weeks prior to admission that he attributes to loss of balance. He denied any syncope, loss of consciousness, tongue biting, or urinary incontinence, history of a neurological disorder or seizures. He lives alone and says that the episodes have not been witnessed.    Patient had an MRI brain without contrast after going to a private neurologist which found: Asymmetric FLAIR hyperintensity in the right limbic lobe involving the hippocampal gyrus. Differential diagnosis includes, but is not limited to, infectious/inflammatory encephalitis, limbic encephalitis and sequela of epilepsy/post ictal edema. Additional mild chronic microvascular changes without acute infarct.    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 He underwent an MRI at [facility], which noted redemonstration of hyperintense signals within medial right temporal lobe with associated subtle swelling. EEG was performed and noted electrographical seizures in the right temporal region with spread to the left. LP was performed with no findings c/w infection; CSF studies for encephalopthy and paraneoplastic panel are pending. On bloodwork, patient was found to be LGI1 positive(1:160) and was started on PLEX on , now s/p 5 doses, last dose . In addition, he also received Rituxan (rest to continue as outpatient) on 8/3 with IV Solumedrol.    Of note, patient also had CT chest, abdomen, and pelvis, which showed chronic tree-in-bud opacities. Pulmonology was consulted, as there was concern encephalitis may precede tumor progression up to 2 years. Per pulmonology, no suspicion of active infection or pulmonary neoplastic process; no need for f/u CT imaging. Hospital course was also c/b bradycardia, but VS otherwise were stable.     Patient was evaluated by PM&R and therapy for functional deficits and gait/ ADL impairments and recommended acute rehabilitation. Patient was medically [facility]. (04 Aug 2020 14:05)      Subjective:  Patient was seen and examined at the bedside this AM. No acute overnight events. Still with occasional muscle spasms that move from left to the right. States he is otherwise doing well, no complaints reported.    Mood fair, stable. No respiratory distress. Denies past h/o bradycardia, denies CP, palpiatations, dizziness, SOB      Physical Exam:  Vital Signs Last 24 Hrs  T(C): 36.7 (06 Aug 2020 09:14), Max: 36.9 (05 Aug 2020 21:09)  T(F): 98 (06 Aug 2020 09:14), Max: 98.4 (05 Aug 2020 21:09)  HR: 61 (06 Aug 2020 09:14) (60 - 61)  BP: 110/64 (06 Aug 2020 09:14) (109/60 - 110/64)  RR: 16 (06 Aug 2020 09:14) (15 - 16)  SpO2: 95% (06 Aug 2020 09:14) (94% - 95%)      Physical Exam:   · Constitutional	detailed exam	  · Constitutional Comments	alert, NAD	  · Respiratory	detailed exam	  · Respiratory Details	respirations non-labored; clear to auscultation bilaterally	  · Cardiovascular	detailed exam	  · Cardiovascular Details	regular rate and rhythm	  · Gastrointestinal	detailed exam	  · GI Normal	soft; nontender; bowel sounds normal	  · Extremities	detailed exam	  · Extremities Comments	BUE and LE normal tone and ROM motor 5/5 BUE and LE +mild dystonia left UE  no calf TTP +soft, no swelling	      Recent Labs/Imagin.6   9.14  )-----------( 216      ( 06 Aug 2020 08:30 )             38.3     08-06    137  |  100  |  27<H>  ----------------------------<  91  4.9   |  31  |  1.15    Ca    8.3<L>      06 Aug 2020 08:30    TPro  5.4<L>  /  Alb  3.2<L>  /  TBili  0.3  /  DBili  x   /  AST  10  /  ALT  30  /  AlkPhos  30<L>  08-05      Medications:  acetaminophen   Tablet .. 650 milliGRAM(s) Oral every 6 hours PRN  enoxaparin Injectable 40 milliGRAM(s) SubCutaneous daily  finasteride 5 milliGRAM(s) Oral daily  polyethylene glycol 3350 17 Gram(s) Oral daily PRN  senna 2 Tablet(s) Oral at bedtime PRN  simvastatin 10 milliGRAM(s) Oral at bedtime  valproic acid 250 milliGRAM(s) Oral two times a day

## 2020-08-06 NOTE — CONSULT NOTE ADULT - ASSESSMENT
DENA FARNSWORTH is a 74yo right-handed male with PMHx of BPH, HTN, and HLD who presented to [facility] on 07/21/2020 with intermittent b/l UE and LE spasms and weakness, found to have Limbic Encephalitis and temporal seizures s/p PLEX x5 and Rituxan x1. Admitted for multidisciplinary rehab program.     #Debility  -Comprehensive Multidisciplinary Rehab Program: Gait, ADLs  -PT/OT 3 hours a day 5 days a week    #Limbic encephalitis  -s/p PLEX x5, last session 7/31  -s/p Rituxan x1, on 8/3  -c/w Valproic acid 250mg BID; plan to taper once DC from rehab with neuro f/u  -Will require monthly IVIG and Rituxan every 6 months    #HTN  -Chronic well controlled off antihypertensive medications  -Agree with holding home med Amlodipine 5mg for now  -Continue to monitor vitals    #Bradycardia  -Asymptomatic  -ECHO reviewed; Normal  -Avoid using BBlockers   -Continue to monitor vitals    #HLD  - c/w simvastatin 10mg qhs    #BPH  - c/w finasteride 5mg once daily  -monitor voiding, PVRs    #DVT prophylaxis:  - Lovenox 40mg once daily
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 He evidenced difficulties in concentration, short-term memory for verbal information, visuospatial skills and aspects of executive functions (initiation/processing speed, problem solving) and language (fluency). His affect is constricted and he only reports irritability at this time in response to his medical condition. FIM scores: Social Interaction 5; Problem Solving 5; Memory 4.  Plan: Pt does not appear to be in acute distress at this time; neuropsychologist remains available. Cognitive remediation during speech therapy sessions is strongly recommended. Participation in recreation/art therapy in order to have pleasure and mastery experiences and regain/reestablish a sense of routine.

## 2020-08-06 NOTE — PROGRESS NOTE ADULT - ASSESSMENT
DENA FARNSWORTH is a 76yo right-handed male with PMHx of BPH, HTN, and HLD who presented to [facility] on 07/21/2020 with intermittent b/l UE and LE spasms and weakness, found to have Limbic Encephalitis and temporal seizures s/p PLEX x5 and Rituxan x1. Admitted for multidisciplinary rehab program.     #Comprehensive Multidisciplinary Rehab Program: Gait, ADL, Functional impairments secondary to limbic encephalitis and temporal SZ  - PT/OT 3 hours a day 5 days a week: start 1 1/2 hours each  - neuropsychology for cognitive evaluaton ordered 8/5    #Limbic encephalitis  - s/p PLEX x5, last session 7/31  - s/p Rituxan x1, on 8/3 and IV solumedrol  - c/w Valproic acid 250mg BID; plan to taper once DC from rehab with neuro f/u  - per neuro, will require monthly IVIG and Rituxan every 6 months    #leukocytosis; RESOLVED  -WBC 9.14 8/6  -patient afebrile, does not appear toxic, without new symptomatic complaints . Possibly due to solumedrol. Discussed with hospitalist, will monitor off Abx  CBC in AM 8/10    #PIEDAD  -elevation BUN/Cr 27/1.15 8/6  -encourage po fluids  -BMP 8/10    #HTN  - on amlodipine 5mg once daily at home  - (114/58 - 133/67) off medications. continue to monitor    #HLD  - c/w simvastatin 10mg qhs    #BPH  - c/w finasteride 5mg once daily     #Sleep:  - Maintain quiet hours and low stim environment  - Melatonin PRN    #Pain:  - Tylenol PRN  - avoid sedating meds that may affect cognitive recovery    #GI/Bowel:  - Senna 2 tabs daily and Miralax once daily PRN    #Diet / Dysphagia:    - Diet: Regular    #DVT prophylaxis:  - Lovenox 40mg once daily  - SCDs    #Labs:  CBC BMP in AM 8/10    --------------------------------------------  Outpatient Follow up:  Neurology - Dr. Scott  PMD - Dr. Ovalle  -------------------------------------------- DENA FARNSWORTH is a 74yo right-handed male with PMHx of BPH, HTN, and HLD who presented to [facility] on 07/21/2020 with intermittent b/l UE and LE spasms and weakness, found to have Limbic Encephalitis and temporal seizures s/p PLEX x5 and Rituxan x1. Admitted for multidisciplinary rehab program.     #Comprehensive Multidisciplinary Rehab Program: Gait, ADL, Functional impairments secondary to limbic encephalitis and temporal SZ  - continue PT/OT 3 hours a day 5 days a week: start 1 1/2 hours each  - neuropsychology for cognitive evaluaton ordered 8/5, in progress    #Limbic encephalitis  - s/p PLEX x5, last session 7/31  - s/p Rituxan x1, on 8/3 and IV solumedrol  - c/w Valproic acid 250mg BID; plan to taper once DC from rehab with neuro f/u  - per neuro, will require monthly IVIG and Rituxan every 6 months    #leukocytosis; RESOLVED  -WBC 9.14 8/6  CBC in AM 8/10    #PIEDAD  -elevation BUN/Cr 27/1.15 8/6  -encourage po fluids  -BMP 8/10    #HTN  - on amlodipine 5mg once daily at home  - stable off medications currently    #HLD  - c/w simvastatin 10mg qhs    #BPH  - c/w finasteride 5mg once daily     #Sleep:  - Maintain quiet hours and low stim environment  - Melatonin PRN    #Pain:  - Tylenol PRN  - avoid sedating meds that may affect cognitive recovery    #GI/Bowel:  - Senna 2 tabs daily and Miralax once daily PRN    #Diet / Dysphagia:    - Diet: Regular    #DVT prophylaxis:  - Lovenox 40mg once daily  - SCDs    #Labs:  CBC BMP in AM 8/10    --------------------------------------------  Outpatient Follow up:  Neurology - Dr. Scott  PMD - Dr. Ovalle  --------------------------------------------

## 2020-08-06 NOTE — PROGRESS NOTE ADULT - ASSESSMENT
DENA FARNSWORTH is a 76yo right-handed male with PMHx of BPH, HTN, and HLD who presented to 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 Admitted for multidisciplinary rehab program.     #Debility  -Comprehensive Multidisciplinary Rehab Program: Gait, ADLs  -PT/OT 3 hours a day 5 days a week    #Limbic encephalitis  -s/p PLEX x5, last session 7/31  -s/p Rituxan x1, on 8/3  -c/w Valproic acid 250mg BID; plan to taper once DC from rehab with neuro f/u  -Will require monthly IVIG and Rituxan every 6 months    #HTN  -Chronic well controlled off antihypertensive medications  -Continue to hold home med Amlodipine 5mg for now  -Continue to monitor vitals    #Bradycardia  -Asymptomatic  -ECHO reviewed; Normal  -Avoid using BBlockers   -Continue to monitor vitals    #HLD  - c/w simvastatin 10mg qhs    #BPH  - c/w finasteride 5mg once daily  -monitor voiding, PVRs    #Leukocytosis  -Resolve  -Likely reactive, not infectious  -No need to monitor daily labs    #DVT prophylaxis:  - Lovenox 40mg once daily

## 2020-08-07 PROCEDURE — 99232 SBSQ HOSP IP/OBS MODERATE 35: CPT

## 2020-08-07 RX ADMIN — SIMVASTATIN 10 MILLIGRAM(S): 20 TABLET, FILM COATED ORAL at 22:08

## 2020-08-07 RX ADMIN — Medication 250 MILLIGRAM(S): at 17:59

## 2020-08-07 RX ADMIN — Medication 250 MILLIGRAM(S): at 05:48

## 2020-08-07 RX ADMIN — ENOXAPARIN SODIUM 40 MILLIGRAM(S): 100 INJECTION SUBCUTANEOUS at 12:13

## 2020-08-07 RX ADMIN — FINASTERIDE 5 MILLIGRAM(S): 5 TABLET, FILM COATED ORAL at 12:12

## 2020-08-07 NOTE — PROGRESS NOTE ADULT - ASSESSMENT
DENA FARNSWORTH is a 74yo right-handed male with PMHx of BPH, HTN, and HLD who presented to [facility] on 07/21/2020 with intermittent b/l UE and LE spasms and weakness, found to have Limbic Encephalitis and temporal seizures s/p PLEX x5 and Rituxan x1. Admitted for multidisciplinary rehab program.     #Comprehensive Multidisciplinary Rehab Program: Gait, ADL, Functional impairments secondary to limbic encephalitis and temporal SZ  - continue PT/OT 3 hours a day 5 days a week: start 1 1/2 hours each  - neuropsychology evaluation appreciated 8/6:  mild cognitive deficits with difficulties in concentration, short-term memory for verbal information, visuospatial skills and aspects of executive functions (initiation/processing speed, problem solving) and language (fluency). His affect is constricted and he only reports irritability at this time in response to his medical condition. FIM scores: Social Interaction 5; Problem Solving 5; Memory 4.  -will order recreation therapy 8/7    #Limbic encephalitis  - s/p PLEX x5, last session 7/31  - s/p Rituxan x1, on 8/3 and IV solumedrol  - c/w Valproic acid 250mg BID (plan to taper once DC from rehab with neuro f/u)  - per neuro, will require monthly IVIG and Rituxan every 6 months  -depakote level 8/10    #leukocytosis: resolved  -WBC 9.14 8/6  CBC in AM 8/10    #PIEDAD  -elevation BUN/Cr 27/1.15 8/6  -encourage po fluids  -BMP 8/10    #HTN  - on amlodipine 5mg once daily at home  - stable off medications currently    #HLD  - c/w simvastatin 10mg qhs    #BPH  - c/w finasteride 5mg once daily     #Sleep:  - Maintain quiet hours and low stim environment  - Melatonin PRN    #Pain:  - Tylenol PRN    #GI/Bowel:  - Senna 2 tabs daily, Miralax once daily PRN    #Diet:    - \ Regular    #DVT prophylaxis:  - Lovenox 40mg once daily  - SCDs    #Labs:  CBC BMP in AM 8/10  depakote 8/10  --------------------------------------------  Outpatient Follow up:  Neurology - Dr. Scott  PMD - Dr. Ovalle  --------------------------------------------

## 2020-08-07 NOTE — PROGRESS NOTE ADULT - COMMENTS
Patient doing well. Looking forward to going home, mood fair. Denies H/A, dizziness, N/V, B/B complaints. Denies any significnat spasms/stiffness when asked directly, however when discussing coordination and dystonia, reports "that's what happens"

## 2020-08-07 NOTE — PROGRESS NOTE ADULT - SUBJECTIVE AND OBJECTIVE BOX
Tho Brewer M.D. 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 No acute events overnight.  Pt denies cp, palpitations, sob, abd pain, N/V, fever, chills.    ROS:  All other review of systems negative    Allergies    No Known Allergies    Intolerances        MEDICATIONS  (STANDING):  enoxaparin Injectable 40 milliGRAM(s) SubCutaneous daily  finasteride 5 milliGRAM(s) Oral daily  simvastatin 10 milliGRAM(s) Oral at bedtime  valproic acid 250 milliGRAM(s) Oral two times a day    MEDICATIONS  (PRN):  acetaminophen   Tablet .. 650 milliGRAM(s) Oral every 6 hours PRN Mild Pain (1 - 3)  polyethylene glycol 3350 17 Gram(s) Oral daily PRN Constipation  senna 2 Tablet(s) Oral at bedtime PRN Constipation      Vital Signs Last 24 Hrs  T(C): 36.9 (07 Aug 2020 08:07), Max: 36.9 (06 Aug 2020 23:07)  T(F): 98.4 (07 Aug 2020 08:07), Max: 98.5 (06 Aug 2020 23:07)  HR: 95 (07 Aug 2020 11:23) (52 - 95)  BP: 132/74 (07 Aug 2020 08:07) (132/74 - 142/70)  BP(mean): --  RR: 16 (07 Aug 2020 08:07) (15 - 16)  SpO2: 97% (07 Aug 2020 08:07) (97% - 97%)  CAPILLARY BLOOD GLUCOSE        I&O's Summary    06 Aug 2020 07:01  -  07 Aug 2020 07:00  --------------------------------------------------------  IN: 0 mL / OUT: 1075 mL / NET: -1075 mL        PHYSICAL EXAM:  GENERAL: NAD, well-developed  CHEST/LUNG: Clear to auscultation bilaterally; No wheeze  HEART: Regular rate and rhythm; No murmurs, rubs, or gallops  ABDOMEN: Soft, Nontender, Nondistended; Bowel sounds present  EXTREMITIES:  2+ Peripheral Pulses, No clubbing, cyanosis, or edema  PSYCH: AAOx3  NEUROLOGY: non-focal  SKIN: No rashes or lesions    LABS:                        12.6   9.14  )-----------( 216      ( 06 Aug 2020 08:30 )             38.3     08-06    137  |  100  |  27<H>  ----------------------------<  91  4.9   |  31  |  1.15    Ca    8.3<L>      06 Aug 2020 08:30                RADIOLOGY & ADDITIONAL TESTS:  Results Reviewed:   Imaging Personally Reviewed:  Electrocardiogram Personally Reviewed:    COORDINATION OF CARE:  Care Discussed with Consultants/Other Providers [Y/N]:  Prior or Outpatient Records Reviewed [Y/N]:

## 2020-08-07 NOTE — PROGRESS NOTE ADULT - ASSESSMENT
DENA FARNSWORTH is a 76yo right-handed male with PMHx of BPH, HTN, and HLD who presented to [facility] on 07/21/2020 with intermittent b/l UE and LE spasms and weakness, found to have Limbic Encephalitis and temporal seizures s/p PLEX x5 and Rituxan x1. Admitted for multidisciplinary rehab program.     #Debility  -Comprehensive Multidisciplinary Rehab Program: Gait, ADLs  -PT/OT    #Limbic encephalitis  -s/p PLEX x5, last session 7/31  -s/p Rituxan x1, on 8/3  -c/w Valproic acid 250mg BID; plan to taper once DC from rehab with neuro f/u  -Will require monthly IVIG and Rituxan every 6 months    #HTN  -Chronic well controlled off antihypertensive medications  -Continue to hold home med Amlodipine 5mg for now  -Continue to monitor vitals    #Bradycardia  -Asymptomatic  -ECHO reviewed; Normal  -Avoid using BBlockers   -Continue to monitor vitals    #HLD  - c/w simvastatin 10mg qhs    #BPH  - c/w finasteride 5mg once daily  -monitor voiding, PVRs    #Leukocytosis  -Resolve  -Likely reactive, not infectious  -No need to monitor daily labs    #DVT prophylaxis:  - Lovenox 40mg once daily

## 2020-08-07 NOTE — PROGRESS NOTE ADULT - SUBJECTIVE AND OBJECTIVE BOX
Patient is a 75y old  Male who presents with a chief complaint of Limbic encephalitis and temporal seizures (06 Aug 2020 11:47)      HPI:  Shawn Alan is a 76yo right-handed male with PMHx of  HTN, HLD, and BPH who presented to [facility] 7/21/20 with frequent, intermittent left upper extremity spasms and weakness since the first week of June that progressed to include his right upper and lower extremity since late June, interfering with daily activities. Patient reports left ear pain prior to symptoms followed by stiffness of his left upper extremity that he describes as spasms, accompanied by shaking. He then noted involvement of his right upper and right lower extremity. The shaking was usually followed by weakness, which lasted a few minutes before regaining full function. He's broken dishes multiple times, as holding a weighted object may precipitate these episodes. He's also suffered multiple falls since June, most recently a couple weeks prior to admission that he attributes to loss of balance. He denied any syncope, loss of consciousness, tongue biting, or urinary incontinence, history of a neurological disorder or seizures. He lives alone and says that the episodes have not been witnessed.    Patient had an MRI brain without contrast after going to a private neurologist which found: Asymmetric FLAIR hyperintensity in the right limbic lobe involving the hippocampal gyrus. Differential diagnosis includes, but is not limited to, infectious/inflammatory encephalitis, limbic encephalitis and sequela of epilepsy/post ictal edema. Additional mild chronic microvascular changes without acute infarct.    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 He underwent an MRI at [facility], which noted redemonstration of hyperintense signals within medial right temporal lobe with associated subtle swelling. EEG was performed and noted electrographical seizures in the right temporal region with spread to the left. LP was performed with no findings c/w infection; CSF studies for encephalopthy and paraneoplastic panel are pending. On bloodwork, patient was found to be LGI1 positive(1:160) and was started on PLEX on 7/23, now s/p 5 doses, last dose 7/31. In addition, he also received Rituxan (rest to continue as outpatient) on 8/3 with IV Solumedrol.    Of note, patient also had CT chest, abdomen, and pelvis, which showed chronic tree-in-bud opacities. Pulmonology was consulted, as there was concern encephalitis may precede tumor progression up to 2 years. Per pulmonology, no suspicion of active infection or pulmonary neoplastic process; no need for f/u CT imaging. Hospital course was also c/b bradycardia, but VS otherwise were stable.     Patient was evaluated by PM&R and therapy for functional deficits and gait/ ADL impairments and recommended acute rehabilitation. Patient was medically [facility] on 08[facility]04/2020. (04 Aug 2020 14:05)      PAST MEDICAL & SURGICAL HISTORY:  Hypertension  HLD (hyperlipidemia)  BPH (benign prostatic hyperplasia)  No significant past surgical history      MEDICATIONS  (STANDING):  enoxaparin Injectable 40 milliGRAM(s) SubCutaneous daily  finasteride 5 milliGRAM(s) Oral daily  simvastatin 10 milliGRAM(s) Oral at bedtime  valproic acid 250 milliGRAM(s) Oral two times a day    MEDICATIONS  (PRN):  acetaminophen   Tablet .. 650 milliGRAM(s) Oral every 6 hours PRN Mild Pain (1 - 3)  polyethylene glycol 3350 17 Gram(s) Oral daily PRN Constipation  senna 2 Tablet(s) Oral at bedtime PRN Constipation      Allergies    No Known Allergies    Intolerances          VITALS  75y  Vital Signs Last 24 Hrs  T(C): 36.9 (07 Aug 2020 08:07), Max: 36.9 (06 Aug 2020 23:07)  T(F): 98.4 (07 Aug 2020 08:07), Max: 98.5 (06 Aug 2020 23:07)  HR: 95 (07 Aug 2020 11:23) (52 - 95)  BP: 132/74 (07 Aug 2020 08:07) (132/74 - 142/70)  BP(mean): --  RR: 16 (07 Aug 2020 08:07) (15 - 16)  SpO2: 97% (07 Aug 2020 08:07) (97% - 97%)  Daily     Daily         RECENT LABS:                          12.6   9.14  )-----------( 216      ( 06 Aug 2020 08:30 )             38.3     08-06    137  |  100  |  27<H>  ----------------------------<  91  4.9   |  31  |  1.15    Ca    8.3<L>      06 Aug 2020 08:30                CAPILLARY BLOOD GLUCOSE

## 2020-08-08 PROCEDURE — 99232 SBSQ HOSP IP/OBS MODERATE 35: CPT

## 2020-08-08 RX ADMIN — SIMVASTATIN 10 MILLIGRAM(S): 20 TABLET, FILM COATED ORAL at 22:44

## 2020-08-08 RX ADMIN — Medication 250 MILLIGRAM(S): at 17:44

## 2020-08-08 RX ADMIN — ENOXAPARIN SODIUM 40 MILLIGRAM(S): 100 INJECTION SUBCUTANEOUS at 12:58

## 2020-08-08 RX ADMIN — Medication 250 MILLIGRAM(S): at 05:44

## 2020-08-08 RX ADMIN — FINASTERIDE 5 MILLIGRAM(S): 5 TABLET, FILM COATED ORAL at 12:58

## 2020-08-08 NOTE — PROGRESS NOTE ADULT - SUBJECTIVE AND OBJECTIVE BOX
Toh Brewer M.D. Pager Number [phone]    Patient is a 75y old  Male who presents with a chief complaint of Limbic encephalitis and temporal seizures (08 Aug 2020 09:12)      SUBJECTIVE / OVERNIGHT EVENTS:  Pt seen and examined at bedside. No acute events overnight.  Pt denies cp, palpitations, sob, abd pain, N/V, fever, chills.    ROS:  All other review of systems negative    Allergies    No Known Allergies    Intolerances        MEDICATIONS  (STANDING):  enoxaparin Injectable 40 milliGRAM(s) SubCutaneous daily  finasteride 5 milliGRAM(s) Oral daily  simvastatin 10 milliGRAM(s) Oral at bedtime  valproic acid 250 milliGRAM(s) Oral two times a day    MEDICATIONS  (PRN):  acetaminophen   Tablet .. 650 milliGRAM(s) Oral every 6 hours PRN Mild Pain (1 - 3)  polyethylene glycol 3350 17 Gram(s) Oral daily PRN Constipation  senna 2 Tablet(s) Oral at bedtime PRN Constipation      Vital Signs Last 24 Hrs  T(C): 37.2 (08 Aug 2020 09:33), Max: 37.2 (08 Aug 2020 09:33)  T(F): 98.9 (08 Aug 2020 09:33), Max: 98.9 (08 Aug 2020 09:33)  HR: 61 (08 Aug 2020 09:33) (61 - 80)  BP: 158/86 (08 Aug 2020 09:33) (145/76 - 158/86)  BP(mean): --  RR: 16 (08 Aug 2020 09:33) (16 - 17)  SpO2: 99% (08 Aug 2020 09:33) (96% - 99%)  CAPILLARY BLOOD GLUCOSE        I&O's Summary    07 Aug 2020 07:01  -  08 Aug 2020 07:00  --------------------------------------------------------  IN: 0 mL / OUT: 2100 mL / NET: -2100 mL        PHYSICAL EXAM:  GENERAL: NAD, well-developed  CHEST/LUNG: Clear to auscultation bilaterally; No wheeze  HEART: Regular rate and rhythm; No murmurs, rubs, or gallops  ABDOMEN: Soft, Nontender, Nondistended; Bowel sounds present  EXTREMITIES:  2+ Peripheral Pulses, No clubbing, cyanosis, or edema  PSYCH: AAOx3  NEUROLOGY: non-focal  SKIN: No rashes or lesions  LABS:                    RADIOLOGY & ADDITIONAL TESTS:  Results Reviewed:   Imaging Personally Reviewed:  Electrocardiogram Personally Reviewed:    COORDINATION OF CARE:  Care Discussed with Consultants/Other Providers [Y/N]:  Prior or Outpatient Records Reviewed [Y/N]:

## 2020-08-08 NOTE — PROGRESS NOTE ADULT - ASSESSMENT
DENA FARNSWORTH is a 74yo right-handed male with PMHx of BPH, HTN, and HLD who presented to [facility] on 07/21/2020 with intermittent b/l UE and LE spasms and weakness, found to have Limbic Encephalitis and temporal seizures s/p PLEX x5 and Rituxan x1. Admitted for multidisciplinary rehab program.     #Debility  -Comprehensive Multidisciplinary Rehab Program: Gait, ADLs  -PT/OT    #Limbic encephalitis  -s/p PLEX x5, last session 7/31  -s/p Rituxan x1, on 8/3  -c/w Valproic acid 250mg BID; plan to taper once DC from rehab with neuro f/u  -Will require monthly IVIG and Rituxan every 6 months    #HTN  -Overall well controlled off antihypertensive medications with one or 2 episodes of HTN with SBP>140  -If persistently elevated, will consider starting Amlodipine 5mg daily  -Continue to hold home med Amlodipine 5mg for now  -Continue to monitor vitals    #Bradycardia  -Asymptomatic  -ECHO reviewed; Normal  -Avoid using BBlockers   -Continue to monitor vitals    #HLD  - c/w simvastatin 10mg qhs    #BPH  - c/w finasteride 5mg once daily  -monitor voiding, PVRs    #Leukocytosis  -Resolve  -Likely reactive, not infectious  -No need to monitor daily labs    #DVT prophylaxis:  - Lovenox 40mg once daily

## 2020-08-08 NOTE — PROGRESS NOTE ADULT - SUBJECTIVE AND OBJECTIVE BOX
No overnight events.  Slept well.  Reports pain is controlled.    REVIEW OF SYSTEMS  Constitutional - No fever,  +fatigue  Neurological - No headaches, +loss of strength  Musculoskeletal - No joint pain, No joint swelling, No muscle pain    VITALS  T(C): 36.7 (08-07-20 @ 21:02), Max: 36.7 (08-07-20 @ 21:02)  HR: 80 (08-07-20 @ 21:02) (80 - 95)  BP: 145/76 (08-07-20 @ 21:02) (145/76 - 145/76)  RR: 17 (08-07-20 @ 21:02) (17 - 17)  SpO2: 96% (08-07-20 @ 21:02) (96% - 96%)  Wt(kg): --       MEDICATIONS   acetaminophen   Tablet .. 650 milliGRAM(s) every 6 hours PRN  enoxaparin Injectable 40 milliGRAM(s) daily  finasteride 5 milliGRAM(s) daily  polyethylene glycol 3350 17 Gram(s) daily PRN  senna 2 Tablet(s) at bedtime PRN  simvastatin 10 milliGRAM(s) at bedtime  valproic acid 250 milliGRAM(s) two times a day      RECENT LABS/IMAGING - Ind Reviewed                      ---------  PHYSICAL EXAM  Constitutional - NAD, Comfortable  Pulm - Breathing comfortably, No wheezing  Abd - Soft   Extremities - No edema   Neurologic Exam -                    Cognitive - Awake, Alert  Psychiatric - Mood WNL     ASSESSMENT/PLAN  75y Male with functional deficits after encephalitis and seizures  Seizures - Depakote  CAD - Lipitor  Pain - Tylenol  DVT PPX - SCD, Lovenox    Continue 3hrs a day of comprehensive rehab program.

## 2020-08-09 LAB
APPEARANCE UR: CLEAR — SIGNIFICANT CHANGE UP
BILIRUB UR-MCNC: NEGATIVE — SIGNIFICANT CHANGE UP
COLOR SPEC: YELLOW — SIGNIFICANT CHANGE UP
DIFF PNL FLD: NEGATIVE — SIGNIFICANT CHANGE UP
GLUCOSE UR QL: NEGATIVE — SIGNIFICANT CHANGE UP
HCV RNA FLD QL NAA+PROBE: SIGNIFICANT CHANGE UP
KETONES UR-MCNC: NEGATIVE — SIGNIFICANT CHANGE UP
LEUKOCYTE ESTERASE UR-ACNC: NEGATIVE — SIGNIFICANT CHANGE UP
NITRITE UR-MCNC: NEGATIVE — SIGNIFICANT CHANGE UP
PH UR: 7 — SIGNIFICANT CHANGE UP (ref 5–8)
PROT UR-MCNC: NEGATIVE — SIGNIFICANT CHANGE UP
SP GR SPEC: 1.01 — SIGNIFICANT CHANGE UP (ref 1.01–1.02)
UROBILINOGEN FLD QL: NEGATIVE — SIGNIFICANT CHANGE UP

## 2020-08-09 PROCEDURE — 99232 SBSQ HOSP IP/OBS MODERATE 35: CPT

## 2020-08-09 RX ADMIN — Medication 250 MILLIGRAM(S): at 17:16

## 2020-08-09 RX ADMIN — SIMVASTATIN 10 MILLIGRAM(S): 20 TABLET, FILM COATED ORAL at 21:54

## 2020-08-09 RX ADMIN — Medication 250 MILLIGRAM(S): at 06:18

## 2020-08-09 RX ADMIN — ENOXAPARIN SODIUM 40 MILLIGRAM(S): 100 INJECTION SUBCUTANEOUS at 11:22

## 2020-08-09 RX ADMIN — FINASTERIDE 5 MILLIGRAM(S): 5 TABLET, FILM COATED ORAL at 11:23

## 2020-08-09 NOTE — PROGRESS NOTE ADULT - ASSESSMENT
DENA FARNSWORTH is a 74yo right-handed male with PMHx of BPH, HTN, and HLD who presented to [facility] on 07/21/2020 with intermittent b/l UE and LE spasms and weakness, found to have Limbic Encephalitis and temporal seizures s/p PLEX x5 and Rituxan x1. Admitted for multidisciplinary rehab program.     #Debility  -Comprehensive Multidisciplinary Rehab Program: Gait, ADLs  -PT/OT    #Limbic encephalitis  -s/p PLEX x5, last session 7/31  -s/p Rituxan x1, on 8/3  -c/w Valproic acid 250mg BID; plan to taper once DC from rehab with neuro f/u  -Will require monthly IVIG and Rituxan every 6 months    #HTN  -Overall well controlled off antihypertensive medications with one or 2 episodes of HTN with SBP>140  -If persistently elevated, will consider starting Amlodipine 5mg daily  -Continue to hold home med Amlodipine 5mg for now  -Continue to monitor vitals    #Bradycardia  -Asymptomatic  -ECHO reviewed; Normal  -Avoid using BBlockers   -Continue to monitor vitals    #HLD  - c/w simvastatin 10mg qhs    #BPH  - c/w finasteride 5mg once daily  -monitor voiding, PVRs    #Leukocytosis  -Resolve  -Likely reactive, not infectious  -No need to monitor daily labs    #DVT prophylaxis:  - Lovenox 40mg once daily

## 2020-08-09 NOTE — PROGRESS NOTE ADULT - SUBJECTIVE AND OBJECTIVE BOX
Tho Brewer M.D. Pager Number [phone]    Patient is a 75y old  Male who presents with a chief complaint of Limbic encephalitis and temporal seizures (09 Aug 2020 08:35)      SUBJECTIVE / OVERNIGHT EVENTS:  Pt seen and examined at bedside. No acute events overnight.  Pt denies cp, palpitations, sob, abd pain, N/V, fever, chills.    ROS:  All other review of systems negative    Allergies    No Known Allergies    Intolerances        MEDICATIONS  (STANDING):  enoxaparin Injectable 40 milliGRAM(s) SubCutaneous daily  finasteride 5 milliGRAM(s) Oral daily  simvastatin 10 milliGRAM(s) Oral at bedtime  valproic acid 250 milliGRAM(s) Oral two times a day    MEDICATIONS  (PRN):  acetaminophen   Tablet .. 650 milliGRAM(s) Oral every 6 hours PRN Mild Pain (1 - 3)  polyethylene glycol 3350 17 Gram(s) Oral daily PRN Constipation  senna 2 Tablet(s) Oral at bedtime PRN Constipation      Vital Signs Last 24 Hrs  T(C): 36.7 (08 Aug 2020 22:40), Max: 36.7 (08 Aug 2020 22:40)  T(F): 98.1 (08 Aug 2020 22:40), Max: 98.1 (08 Aug 2020 22:40)  HR: 59 (08 Aug 2020 22:40) (59 - 59)  BP: 103/60 (08 Aug 2020 22:40) (103/60 - 103/60)  BP(mean): --  RR: 16 (08 Aug 2020 22:40) (16 - 16)  SpO2: 96% (08 Aug 2020 22:40) (96% - 96%)  CAPILLARY BLOOD GLUCOSE        I&O's Summary    08 Aug 2020 07:01  -  09 Aug 2020 07:00  --------------------------------------------------------  IN: 0 mL / OUT: 650 mL / NET: -650 mL        PHYSICAL EXAM:  GENERAL: NAD, well-developed  CHEST/LUNG: Clear to auscultation bilaterally; No wheeze  HEART: Regular rate and rhythm; No murmurs, rubs, or gallops  ABDOMEN: Soft, Nontender, Nondistended; Bowel sounds present  EXTREMITIES:  2+ Peripheral Pulses, No clubbing, cyanosis, or edema  PSYCH: AAOx3  NEUROLOGY: non-focal  SKIN: No rashes or lesions    LABS:                    RADIOLOGY & ADDITIONAL TESTS:  Results Reviewed:   Imaging Personally Reviewed:  Electrocardiogram Personally Reviewed:    COORDINATION OF CARE:  Care Discussed with Consultants/Other Providers [Y/N]:  Prior or Outpatient Records Reviewed [Y/N]:

## 2020-08-09 NOTE — PROGRESS NOTE ADULT - SUBJECTIVE AND OBJECTIVE BOX
No overnight events.  Reports that he had pain with urination.  No noted color change or odor, but has had frequency.   No fevers or chills.    REVIEW OF SYSTEMS  Constitutional - No fever,  +fatigue  Neurological - No headaches, +loss of strength  Musculoskeletal - No joint pain, No joint swelling, No muscle pain      VITALS  T(C): 36.7 (08-08-20 @ 22:40), Max: 37.2 (08-08-20 @ 09:33)  HR: 59 (08-08-20 @ 22:40) (59 - 61)  BP: 103/60 (08-08-20 @ 22:40) (103/60 - 158/86)  RR: 16 (08-08-20 @ 22:40) (16 - 16)  SpO2: 96% (08-08-20 @ 22:40) (96% - 99%)  Wt(kg): --        MEDICATIONS   acetaminophen   Tablet .. 650 milliGRAM(s) every 6 hours PRN  enoxaparin Injectable 40 milliGRAM(s) daily  finasteride 5 milliGRAM(s) daily  polyethylene glycol 3350 17 Gram(s) daily PRN  senna 2 Tablet(s) at bedtime PRN  simvastatin 10 milliGRAM(s) at bedtime  valproic acid 250 milliGRAM(s) two times a day      RECENT LABS/IMAGING - Reviewed                      ---------  PHYSICAL EXAM  Constitutional - NAD, Comfortable  Pulm - Breathing comfortably, No wheezing  Abd - Soft   Extremities - No edema   Neurologic Exam -                    Cognitive - Awake, Alert  Psychiatric - Mood WNL     ASSESSMENT/PLAN  75y Male with functional deficits after encephalitis and seizures  Seizures - Depakote  Dysuria & Frequency - UA ordered (last 7/28 was WNL)  CAD - Lipitor  Pain - Tylenol  DVT PPX - SCD, Lovenox    Continue 3hrs a day of comprehensive rehab program.

## 2020-08-10 LAB
ANION GAP SERPL CALC-SCNC: 7 MMOL/L — SIGNIFICANT CHANGE UP (ref 5–17)
BUN SERPL-MCNC: 17 MG/DL — SIGNIFICANT CHANGE UP (ref 7–23)
CALCIUM SERPL-MCNC: 8.6 MG/DL — SIGNIFICANT CHANGE UP (ref 8.4–10.5)
CHLORIDE SERPL-SCNC: 100 MMOL/L — SIGNIFICANT CHANGE UP (ref 96–108)
CO2 SERPL-SCNC: 29 MMOL/L — SIGNIFICANT CHANGE UP (ref 22–31)
CREAT SERPL-MCNC: 1.09 MG/DL — SIGNIFICANT CHANGE UP (ref 0.5–1.3)
GLUCOSE SERPL-MCNC: 90 MG/DL — SIGNIFICANT CHANGE UP (ref 70–99)
HCT VFR BLD CALC: 39.7 % — SIGNIFICANT CHANGE UP (ref 39–50)
HGB BLD-MCNC: 13.1 G/DL — SIGNIFICANT CHANGE UP (ref 13–17)
MCHC RBC-ENTMCNC: 29.6 PG — SIGNIFICANT CHANGE UP (ref 27–34)
MCHC RBC-ENTMCNC: 33 GM/DL — SIGNIFICANT CHANGE UP (ref 32–36)
MCV RBC AUTO: 89.6 FL — SIGNIFICANT CHANGE UP (ref 80–100)
NRBC # BLD: 0 /100 WBCS — SIGNIFICANT CHANGE UP (ref 0–0)
PLATELET # BLD AUTO: 259 K/UL — SIGNIFICANT CHANGE UP (ref 150–400)
POTASSIUM SERPL-MCNC: 4.4 MMOL/L — SIGNIFICANT CHANGE UP (ref 3.5–5.3)
POTASSIUM SERPL-SCNC: 4.4 MMOL/L — SIGNIFICANT CHANGE UP (ref 3.5–5.3)
RBC # BLD: 4.43 M/UL — SIGNIFICANT CHANGE UP (ref 4.2–5.8)
RBC # FLD: 13.7 % — SIGNIFICANT CHANGE UP (ref 10.3–14.5)
SODIUM SERPL-SCNC: 136 MMOL/L — SIGNIFICANT CHANGE UP (ref 135–145)
VALPROATE SERPL-MCNC: 43 UG/ML — LOW (ref 50–100)
WBC # BLD: 8.38 K/UL — SIGNIFICANT CHANGE UP (ref 3.8–10.5)
WBC # FLD AUTO: 8.38 K/UL — SIGNIFICANT CHANGE UP (ref 3.8–10.5)

## 2020-08-10 PROCEDURE — 99232 SBSQ HOSP IP/OBS MODERATE 35: CPT

## 2020-08-10 PROCEDURE — 99233 SBSQ HOSP IP/OBS HIGH 50: CPT

## 2020-08-10 RX ORDER — DIVALPROEX SODIUM 500 MG/1
500 TABLET, DELAYED RELEASE ORAL AT BEDTIME
Refills: 0 | Status: COMPLETED | OUTPATIENT
Start: 2020-08-10 | End: 2020-08-10

## 2020-08-10 RX ADMIN — Medication 250 MILLIGRAM(S): at 05:33

## 2020-08-10 RX ADMIN — FINASTERIDE 5 MILLIGRAM(S): 5 TABLET, FILM COATED ORAL at 12:14

## 2020-08-10 RX ADMIN — Medication 250 MILLIGRAM(S): at 17:42

## 2020-08-10 RX ADMIN — ENOXAPARIN SODIUM 40 MILLIGRAM(S): 100 INJECTION SUBCUTANEOUS at 12:14

## 2020-08-10 RX ADMIN — DIVALPROEX SODIUM 500 MILLIGRAM(S): 500 TABLET, DELAYED RELEASE ORAL at 21:26

## 2020-08-10 RX ADMIN — SIMVASTATIN 10 MILLIGRAM(S): 20 TABLET, FILM COATED ORAL at 21:26

## 2020-08-10 NOTE — PROGRESS NOTE ADULT - SUBJECTIVE AND OBJECTIVE BOX
Patient is a 75y old  Male who presents with a chief complaint of Limbic encephalitis and temporal seizures (10 Aug 2020 09:26)      HPI:  Shawn Alan is a 76yo right-handed male with PMHx of  HTN, HLD, and BPH who presented to [facility] 20 with frequent, intermittent left upper extremity spasms and weakness since the first week of  that progressed to include his right upper and lower extremity since late , interfering with daily activities. Patient reports left ear pain prior to symptoms followed by stiffness of his left upper extremity that he describes as spasms, accompanied by shaking. He then noted involvement of his right upper and right lower extremity. The shaking was usually followed by weakness, which lasted a few minutes before regaining full function. He's broken dishes multiple times, as holding a weighted object may precipitate these episodes. He's also suffered multiple falls since , most recently a couple weeks prior to admission that he attributes to loss of balance. He denied any syncope, loss of consciousness, tongue biting, or urinary incontinence, history of a neurological disorder or seizures. He lives alone and says that the episodes have not been witnessed.    Patient had an MRI brain without contrast after going to a private neurologist which found: Asymmetric FLAIR hyperintensity in the right limbic lobe involving the hippocampal gyrus. Differential diagnosis includes, but is not limited to, infectious/inflammatory encephalitis, limbic encephalitis and sequela of epilepsy/post ictal edema. Additional mild chronic microvascular changes without acute infarct.    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 He underwent an MRI at [facility], which noted redemonstration of hyperintense signals within medial right temporal lobe with associated subtle swelling. EEG was performed and noted electrographical seizures in the right temporal region with spread to the left. LP was performed with no findings c/w infection; CSF studies for encephalopthy and paraneoplastic panel are pending. On bloodwork, patient was found to be LGI1 positive(1:160) and was started on PLEX on , now s/p 5 doses, last dose . In addition, he also received Rituxan (rest to continue as outpatient) on 8/3 with IV Solumedrol.    Of note, patient also had CT chest, abdomen, and pelvis, which showed chronic tree-in-bud opacities. Pulmonology was consulted, as there was concern encephalitis may precede tumor progression up to 2 years. Per pulmonology, no suspicion of active infection or pulmonary neoplastic process; no need for f/u CT imaging. Hospital course was also c/b bradycardia, but VS otherwise were stable.     Patient was evaluated by PM&R and therapy for functional deficits and gait/ ADL impairments and recommended acute rehabilitation. Patient was medically [facility]. (04 Aug 2020 14:05)      PAST MEDICAL & SURGICAL HISTORY:  Hypertension  HLD (hyperlipidemia)  BPH (benign prostatic hyperplasia)  No significant past surgical history      MEDICATIONS  (STANDING):  enoxaparin Injectable 40 milliGRAM(s) SubCutaneous daily  finasteride 5 milliGRAM(s) Oral daily  simvastatin 10 milliGRAM(s) Oral at bedtime  valproic acid 250 milliGRAM(s) Oral two times a day    MEDICATIONS  (PRN):  acetaminophen   Tablet .. 650 milliGRAM(s) Oral every 6 hours PRN Mild Pain (1 - 3)  polyethylene glycol 3350 17 Gram(s) Oral daily PRN Constipation  senna 2 Tablet(s) Oral at bedtime PRN Constipation      Allergies    No Known Allergies    Intolerances          VITALS  75y  Vital Signs Last 24 Hrs  T(C): 36.9 (09 Aug 2020 21:51), Max: 36.9 (09 Aug 2020 18:01)  T(F): 98.5 (09 Aug 2020 21:51), Max: 98.5 (09 Aug 2020 21:51)  HR: 63 (09 Aug 2020 21:51) (63 - 75)  BP: 134/79 (09 Aug 2020 21:51) (103/73 - 134/79)  BP(mean): --  RR: 16 (09 Aug 2020 21:51) (16 - 16)  SpO2: 94% (09 Aug 2020 21:51) (94% - 94%)  Daily     Daily         RECENT LABS:                          13.1   8.38  )-----------( 259      ( 10 Aug 2020 07:30 )             39.7     08-10    136  |  100  |  17  ----------------------------<  90  4.4   |  29  |  1.09    Ca    8.6      10 Aug 2020 07:30          Urinalysis Basic - ( 09 Aug 2020 12:45 )    Color: Yellow / Appearance: Clear / S.010 / pH: x  Gluc: x / Ketone: Negative  / Bili: Negative / Urobili: Negative   Blood: x / Protein: Negative / Nitrite: Negative   Leuk Esterase: Negative / RBC: x / WBC x   Sq Epi: x / Non Sq Epi: x / Bacteria: x          CAPILLARY BLOOD GLUCOSE

## 2020-08-10 NOTE — PROGRESS NOTE ADULT - COMMENTS
Patient noted to have periods of incontinence in both OT and PT. Reduced initiation, not requesting to be toileted. UA was sent over the weekend, afebrile, UA negative    Patient reports that dystonic movements have largely resolved, although will still offer indepnedent complaints of "stiffness". Reduced insight and awareness noted,

## 2020-08-10 NOTE — PROGRESS NOTE ADULT - SUBJECTIVE AND OBJECTIVE BOX
Tho Brewer M.D. Pager Number [phone]    Patient is a 75y old  Male who presents with a chief complaint of Limbic encephalitis and temporal seizures (09 Aug 2020 10:30)      SUBJECTIVE / OVERNIGHT EVENTS:  Pt seen and examined at bedside. No acute events overnight.  Pt denies cp, palpitations, sob, abd pain, N/V, fever, chills.    ROS:  All other review of systems negative    Allergies    No Known Allergies    Intolerances        MEDICATIONS  (STANDING):  enoxaparin Injectable 40 milliGRAM(s) SubCutaneous daily  finasteride 5 milliGRAM(s) Oral daily  simvastatin 10 milliGRAM(s) Oral at bedtime  valproic acid 250 milliGRAM(s) Oral two times a day    MEDICATIONS  (PRN):  acetaminophen   Tablet .. 650 milliGRAM(s) Oral every 6 hours PRN Mild Pain (1 - 3)  polyethylene glycol 3350 17 Gram(s) Oral daily PRN Constipation  senna 2 Tablet(s) Oral at bedtime PRN Constipation      Vital Signs Last 24 Hrs  T(C): 36.9 (09 Aug 2020 21:51), Max: 36.9 (09 Aug 2020 18:01)  T(F): 98.5 (09 Aug 2020 21:51), Max: 98.5 (09 Aug 2020 21:51)  HR: 63 (09 Aug 2020 21:51) (63 - 75)  BP: 134/79 (09 Aug 2020 21:51) (103/73 - 134/79)  BP(mean): --  RR: 16 (09 Aug 2020 21:51) (16 - 16)  SpO2: 94% (09 Aug 2020 21:51) (94% - 94%)  CAPILLARY BLOOD GLUCOSE        I&O's Summary    09 Aug 2020 07:01  -  10 Aug 2020 07:00  --------------------------------------------------------  IN: 0 mL / OUT: 800 mL / NET: -800 mL        PHYSICAL EXAM:  GENERAL: NAD, well-developed  CHEST/LUNG: Clear to auscultation bilaterally; No wheeze  HEART: Regular rate and rhythm; No murmurs, rubs, or gallops  ABDOMEN: Soft, Nontender, Nondistended; Bowel sounds present  EXTREMITIES:  2+ Peripheral Pulses, No clubbing, cyanosis, or edema  PSYCH: AAOx3  NEUROLOGY: non-focal  SKIN: No rashes or lesions    LABS:                        13.1   8.38  )-----------( 259      ( 10 Aug 2020 07:30 )             39.7     08-10    136  |  100  |  17  ----------------------------<  90  4.4   |  29  |  1.09    Ca    8.6      10 Aug 2020 07:30            Urinalysis Basic - ( 09 Aug 2020 12:45 )    Color: Yellow / Appearance: Clear / S.010 / pH: x  Gluc: x / Ketone: Negative  / Bili: Negative / Urobili: Negative   Blood: x / Protein: Negative / Nitrite: Negative   Leuk Esterase: Negative / RBC: x / WBC x   Sq Epi: x / Non Sq Epi: x / Bacteria: x        RADIOLOGY & ADDITIONAL TESTS:  Results Reviewed:   Imaging Personally Reviewed:  Electrocardiogram Personally Reviewed:    COORDINATION OF CARE:  Care Discussed with Consultants/Other Providers [Y/N]:  Prior or Outpatient Records Reviewed [Y/N]:

## 2020-08-10 NOTE — PROGRESS NOTE ADULT - ASSESSMENT
DENA FARNSWORTH is a 76yo right-handed male with PMHx of BPH, HTN, and HLD who presented to [facility] on 07/21/2020 with intermittent b/l UE and LE spasms and weakness, found to have Limbic Encephalitis and temporal seizures s/p PLEX x5 and Rituxan x1. Admitted for multidisciplinary rehab program.     #Debility  -Comprehensive Multidisciplinary Rehab Program: Gait, ADLs  -PT/OT    #Limbic encephalitis  -s/p PLEX x5, last session 7/31  -s/p Rituxan x1, on 8/3  -c/w Valproic acid 250mg BID; plan to taper once DC from rehab with neuro f/u  -Will require monthly IVIG and Rituxan every 6 months    #HTN  -Overall well controlled off antihypertensive medications with one or 2 episodes of HTN with SBP>140  -If persistently elevated, will consider starting Amlodipine 5mg daily  -Continue to hold home med Amlodipine 5mg for now  -Continue to monitor vitals    #Bradycardia  -Asymptomatic  -ECHO reviewed; Normal  -Avoid using BBlockers   -Continue to monitor vitals    #HLD  - c/w simvastatin 10mg qhs    #BPH  - c/w finasteride 5mg once daily  -monitor voiding, PVRs    #Leukocytosis  -Resolve  -Likely reactive, not infectious  -No need to monitor daily labs    #DVT prophylaxis:  - Lovenox 40mg once daily

## 2020-08-10 NOTE — PROGRESS NOTE ADULT - ASSESSMENT
DENA FARNSWORTH is a 74yo right-handed male with PMHx of BPH, HTN, and HLD who presented to [facility] on 07/21/2020 with intermittent b/l UE and LE spasms and weakness, found to have Limbic Encephalitis and temporal seizures s/p PLEX x5 and Rituxan x1. Admitted for multidisciplinary rehab program.     #Comprehensive Multidisciplinary Rehab Program: Gait, ADL, Functional impairments secondary to limbic encephalitis and temporal SZ  - continue PT/OT/SLP 3 hours a day 5 days a week  - neuropsychology evaluation appreciated 8/6:  mild cognitive deficits with difficulties in concentration, short-term memory for verbal information, visuospatial skills and aspects of executive functions (initiation/processing speed, problem solving) and language (fluency). Will need supervision in community on discharge  -recreation therapy ordered 8/7    #Limbic encephalitis  - s/p PLEX x5, last session 7/31  - s/p Rituxan x1, on 8/3 and IV solumedrol  - c/w Valproic acid 250mg BID (plan to taper once DC from rehab with neuro f/u)  - per neuro, will require monthly IVIG and Rituxan every 6 months  -depakote level 8/10-->43. Will give extra dose 500 mg tonight given +EEG findings and continue 250 bid as plan is for eventual dc    #leukocytosis: resolved  -WBC 9.14 8/6  WBC-->8.38 resolved 8/10    #PIEDAD  -elevation BUN/Cr 27/1.15 8/6  -encourage po fluids  -BMP 8/10-->17/1.09 improved    #HTN  - on amlodipine 5mg once daily at home  - stable off medications     #HLD  - c/w simvastatin 10mg qhs    #BPH/  - c/w finasteride 5mg once daily   -periods of incontinence. UA 8/9 negative  -toileting schedule, frequent rounds    #Pain:  - Tylenol PRN    #GI/Bowel:  - Senna 2 tabs daily, Miralax once daily PRN    #Diet:    - \ Regular    #DVT prophylaxis:  - Lovenox 40mg once daily  - SCDs    #Case discussedi n IDT rounds 8/10:  Patient requires supervision/CG ambulation, reduced balance, supervision LE dressing, min assist bathing and toileting, CG tub transfers. Reduced initiation and difficulties in sequencing, reduced insight. Periods incontinence B/B and lives alone  -goals: supervision all bADLs and transfers. Recommend supervision iADLs and during waking hours. Will need to discuss possible hiring  on dc  -[employer] dc home 8/13 with home Pt, OT, SLP and supervision during waking hours    #Labs:  toileting schedule  --------------------------------------------  Outpatient Follow up:  Neurology - Dr. Tyler ADAME - Dr. Ovalle  --------------------------------------------

## 2020-08-10 NOTE — CHART NOTE - NSCHARTNOTEFT_GEN_A_CORE
Nutrition Follow Up Note  Hospital Course   (Per Electronic Medical Record)    Source:  Patient [X]  Medical Record [X]      Diet:   Soft (Dysphagia 3-Soft & Bite Sized) Diet w/ Thin Liquids  - Diet Consistency Downgraded on 8/7  Tolerates Diet Well  No Chewing/Swallowing Difficulties  No Recent Nausea, Vomiting, Diarrhea or Constipation  Consumes 100% of Meals (as Per Documentation)  States Good PO Intake/Appetite   Nutrition Education Provided on Soft (Dysphagia 3-Soft & Bite Sized) Diet     Enteral/Parenteral Nutrition: Not Applicable    Current Weight:   Obtain New Weight  Obtain Weights Weekly     Pertinent Medications: MEDICATIONS  (STANDING):  enoxaparin Injectable 40 milliGRAM(s) SubCutaneous daily  finasteride 5 milliGRAM(s) Oral daily  simvastatin 10 milliGRAM(s) Oral at bedtime  valproic acid 250 milliGRAM(s) Oral two times a day    MEDICATIONS  (PRN):  acetaminophen   Tablet .. 650 milliGRAM(s) Oral every 6 hours PRN Mild Pain (1 - 3)  polyethylene glycol 3350 17 Gram(s) Oral daily PRN Constipation  senna 2 Tablet(s) Oral at bedtime PRN Constipation    Pertinent Labs:  08-10 Na136 mmol/L Glu 90 mg/dL K+ 4.4 mmol/L Cr  1.09 mg/dL BUN 17 mg/dL 08-05 Alb 3.2 g/dL<L>    Skin: No Pressure Ulcers    Edema: None Noted     Last Bowel Movement: on 8/7    Estimated Needs:   [X] No Change Since Previous Assessment    Previous Nutrition Diagnosis:   No Active Nutrition Dx @ This Present Time      Nutrition Diagnosis is [X] Not Applicable     New Nutrition Diagnosis: [X] Chewing Difficulties  Related to as Seizure Evidence By Soft (Dysphagia 3-Soft & Bite Sized) Diet     Interventions:   1. Nutrition Education Provided on Soft (Dysphagia 3-Soft & Bite Sized) Diet  2. Recommend Continue Nutrition Plan of Care     Monitoring & Evaluation:   [X] Weights   [X] PO Intake   [X] Skin Integrity   [X] Follow Up (Per Protocol)  [X] Tolerance to Diet Prescription   [X] Other: Labs     Registered Dietitian/Nutritionist Remains Available.  Sterling Thomson RDN    Pager # [phone]  Phone# [phone]

## 2020-08-11 PROCEDURE — 99232 SBSQ HOSP IP/OBS MODERATE 35: CPT

## 2020-08-11 RX ADMIN — FINASTERIDE 5 MILLIGRAM(S): 5 TABLET, FILM COATED ORAL at 12:09

## 2020-08-11 RX ADMIN — Medication 250 MILLIGRAM(S): at 17:28

## 2020-08-11 RX ADMIN — ENOXAPARIN SODIUM 40 MILLIGRAM(S): 100 INJECTION SUBCUTANEOUS at 12:08

## 2020-08-11 RX ADMIN — SIMVASTATIN 10 MILLIGRAM(S): 20 TABLET, FILM COATED ORAL at 21:28

## 2020-08-11 RX ADMIN — Medication 250 MILLIGRAM(S): at 05:13

## 2020-08-11 NOTE — PROGRESS NOTE ADULT - SUBJECTIVE AND OBJECTIVE BOX
Patient is a 75y old  Male who presents with a chief complaint of Limbic encephalitis and temporal seizures (11 Aug 2020 10:35)      HPI:  Shawn Alan is a 74yo right-handed male with PMHx of  HTN, HLD, and BPH who presented to [facility] 7/21/20 with frequent, intermittent left upper extremity spasms and weakness since the first week of June that progressed to include his right upper and lower extremity since late June, interfering with daily activities. Patient reports left ear pain prior to symptoms followed by stiffness of his left upper extremity that he describes as spasms, accompanied by shaking. He then noted involvement of his right upper and right lower extremity. The shaking was usually followed by weakness, which lasted a few minutes before regaining full function. He's broken dishes multiple times, as holding a weighted object may precipitate these episodes. He's also suffered multiple falls since June, most recently a couple weeks prior to admission that he attributes to loss of balance. He denied any syncope, loss of consciousness, tongue biting, or urinary incontinence, history of a neurological disorder or seizures. He lives alone and says that the episodes have not been witnessed.    Patient had an MRI brain without contrast after going to a private neurologist which found: Asymmetric FLAIR hyperintensity in the right limbic lobe involving the hippocampal gyrus. Differential diagnosis includes, but is not limited to, infectious/inflammatory encephalitis, limbic encephalitis and sequela of epilepsy/post ictal edema. Additional mild chronic microvascular changes without acute infarct.    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 He underwent an MRI at [facility], which noted redemonstration of hyperintense signals within medial right temporal lobe with associated subtle swelling. EEG was performed and noted electrographical seizures in the right temporal region with spread to the left. LP was performed with no findings c/w infection; CSF studies for encephalopthy and paraneoplastic panel are pending. On bloodwork, patient was found to be LGI1 positive(1:160) and was started on PLEX on 7/23, now s/p 5 doses, last dose 7/31. In addition, he also received Rituxan (rest to continue as outpatient) on 8/3 with IV Solumedrol.    Of note, patient also had CT chest, abdomen, and pelvis, which showed chronic tree-in-bud opacities. Pulmonology was consulted, as there was concern encephalitis may precede tumor progression up to 2 years. Per pulmonology, no suspicion of active infection or pulmonary neoplastic process; no need for f/u CT imaging. Hospital course was also c/b bradycardia, but VS otherwise were stable.     Patient was evaluated by PM&R and therapy for functional deficits and gait/ ADL impairments and recommended acute rehabilitation. Patient was medically optimized for [facility]. (04 Aug 2020 14:05)      PAST MEDICAL & SURGICAL HISTORY:  Hypertension  HLD (hyperlipidemia)  BPH (benign prostatic hyperplasia)  No significant past surgical history      MEDICATIONS  (STANDING):  enoxaparin Injectable 40 milliGRAM(s) SubCutaneous daily  finasteride 5 milliGRAM(s) Oral daily  simvastatin 10 milliGRAM(s) Oral at bedtime  valproic acid 250 milliGRAM(s) Oral two times a day    MEDICATIONS  (PRN):  acetaminophen   Tablet .. 650 milliGRAM(s) Oral every 6 hours PRN Mild Pain (1 - 3)  polyethylene glycol 3350 17 Gram(s) Oral daily PRN Constipation  senna 2 Tablet(s) Oral at bedtime PRN Constipation      Allergies    No Known Allergies    Intolerances          VITALS  75y  Vital Signs Last 24 Hrs  T(C): 37.1 (11 Aug 2020 09:04), Max: 37.1 (10 Aug 2020 21:24)  T(F): 98.8 (11 Aug 2020 09:04), Max: 98.8 (11 Aug 2020 09:04)  HR: 84 (11 Aug 2020 09:04) (65 - 84)  BP: 118/79 (11 Aug 2020 09:04) (109/70 - 125/72)  BP(mean): --  RR: 15 (11 Aug 2020 09:04) (15 - 16)  SpO2: 97% (11 Aug 2020 09:04) (94% - 97%)  Daily     Daily         RECENT LABS:                          13.1   8.38  )-----------( 259      ( 10 Aug 2020 07:30 )             39.7     08-10    136  |  100  |  17  ----------------------------<  90  4.4   |  29  |  1.09    Ca    8.6      10 Aug 2020 07:30                CAPILLARY BLOOD GLUCOSE            Review of Systems:   · Additional ROS	Patient denies incontinence. When episodes of urinary incontinence was brought up states that when he's home his bathroom is close by so there won't be an issue. Reduced insight and problem-solving and awareness. does not think he has any cognitive issues; team recommendatinos and findings discussed 	    Physical Exam:   · Constitutional	detailed exam	  · Constitutional Comments	alert O x 3 grossly NAD . Flat , blunted affect, mildly irritable no dysarthria	  · Respiratory	detailed exam	  · Respiratory Details	good air movement; respirations non-labored; clear to auscultation bilaterally	  · Cardiovascular	detailed exam	  · Cardiovascular Details	regular rate and rhythm	  · Gastrointestinal	detailed exam	  · GI Normal	soft; nontender; bowel sounds normal	  · Extremities	detailed exam	  · Extremities Comments	no calf swelling +soft, NT no erythema or warmth no calf TTP normal tone, no tremors no dystonic movements noted with generalized movemetns

## 2020-08-11 NOTE — PROGRESS NOTE ADULT - ASSESSMENT
DENA FARNSWORTH is a 74yo right-handed male with PMHx of BPH, HTN, and HLD who presented to [facility] on 07/21/2020 with intermittent b/l UE and LE spasms and weakness, found to have Limbic Encephalitis and temporal seizures s/p PLEX x5 and Rituxan x1. Admitted for multidisciplinary rehab program.     #Debility  -Comprehensive Multidisciplinary Rehab Program: Gait, ADLs  -PT/OT    #Limbic encephalitis  -s/p PLEX x5, last session 7/31  -s/p Rituxan x1, on 8/3  -Subtherapeutic Valproic acid level; s/p additional 500mg Valproic acid given yesterday.  -c/w Valproic acid 250mg BID  -Outpatient follow up with Neurology  -Will require monthly IVIG and Rituxan every 6 months    #HTN  -Overall well controlled off antihypertensive medications with one or 2 episodes of HTN with SBP>140  -If persistently elevated, will consider starting Amlodipine 5mg daily  -Continue to hold home med Amlodipine 5mg for now  -Continue to monitor vitals    #Bradycardia  -Asymptomatic  -ECHO reviewed; Normal  -Avoid using BBlockers   -Continue to monitor vitals    #HLD  - c/w simvastatin 10mg qhs    #BPH  - c/w finasteride 5mg once daily  -monitor voiding, PVRs    #Leukocytosis  -Resolve  -Likely reactive, not infectious  -No need to monitor daily labs    #DVT prophylaxis:  - Lovenox 40mg once daily

## 2020-08-11 NOTE — PROGRESS NOTE ADULT - ASSESSMENT
DENA FARNSWORTH is a 76yo right-handed male with PMHx of BPH, HTN, and HLD who presented to [facility] on 07/21/2020 with intermittent b/l UE and LE spasms and weakness, found to have Limbic Encephalitis and temporal seizures s/p PLEX x5 and Rituxan x1. Admitted for multidisciplinary rehab program.     #Comprehensive Multidisciplinary Rehab Program: Gait, ADL, Functional impairments secondary to limbic encephalitis and temporal SZ  - continue PT/OT/SLP 3 hours a day 5 days a week  - neuropsychology evaluation appreciated 8/6:  mild cognitive deficits with difficulties in concentration, short-term memory for verbal information, visuospatial skills and aspects of executive functions (initiation/processing speed, problem solving) and language (fluency). Will need supervision in community on discharge . This was discussed with patient; he reports a friend, Belinda, who may be able to help. Possible private hiring  also discussed, will have SW follow up  -recreation therapy   Prec: fall, SZ, cognition    #Limbic encephalitis  - s/p PLEX x5, last session 7/31  - s/p Rituxan x1, on 8/3 and IV solumedrol  - c/w Valproic acid 250mg BID (plan to taper once DC from rehab with neuro f/u)  - per neuro, will require monthly IVIG and Rituxan every 6 months  -depakote level 8/10-->43. Will give extra dose 500 mg tonight given +EEG findings and continue 250 bid as plan is for eventual dc  -neuro f/u on dc    #leukocytosis: resolved  -WBC 9.14 8/6  WBC-->8.38 resolved 8/10  -PCP f/u on dc    #PIEDAD  -elevation BUN/Cr 27/1.15 8/6  -encourage po fluids  -BMP 8/10-->17/1.09 improved  BMp 8/13    #HTN  - on amlodipine 5mg once daily at home  - stable off medications   -PCP f/u on dc    #HLD  - c/w simvastatin 10mg qhs    #BPH/  - c/w finasteride 5mg once daily   -periods of incontinence. UA 8/9 negative  -toileting schedule, frequent rounds. Toilet before bedtime, discussed with patient as well    #Pain:  - Tylenol PRN    #GI/Bowel:  - Senna 2 tabs daily, Miralax once daily PRN    #Diet:    - \ Regular    #DVT prophylaxis:  - Lovenox 40mg once daily  - SCDs    #Case discussedi n IDT rounds 8/10:  Patient requires supervision/CG ambulation, reduced balance, supervision LE dressing, min assist bathing and toileting, CG tub transfers. Reduced initiation and difficulties in sequencing, reduced insight. Periods incontinence B/B and lives alone  -goals: supervision all bADLs and transfers. Recommend supervision iADLs and during waking hours. Will need to discuss possible hiring  on dc  -[facility] dc home 8/13 with home Pt, OT, SLP and supervision during waking hours    #Labs:  toileting schedule  --------------------------------------------  Outpatient Follow up:  Neurology - Dr. Scott  PMD - Dr. Ovalle  --------------------------------------------

## 2020-08-11 NOTE — PROGRESS NOTE ADULT - SUBJECTIVE AND OBJECTIVE BOX
Tho Brewer M.D. Pager Number [phone]    Patient is a 75y old  Male who presents with a chief complaint of Limbic encephalitis and temporal seizures (10 Aug 2020 13:07)      SUBJECTIVE / OVERNIGHT EVENTS:  Pt seen and examined at bedside. No acute events overnight.  Pt denies cp, palpitations, sob, abd pain, N/V, fever, chills.    ROS:  All other review of systems negative    Allergies    No Known Allergies    Intolerances        MEDICATIONS  (STANDING):  enoxaparin Injectable 40 milliGRAM(s) SubCutaneous daily  finasteride 5 milliGRAM(s) Oral daily  simvastatin 10 milliGRAM(s) Oral at bedtime  valproic acid 250 milliGRAM(s) Oral two times a day    MEDICATIONS  (PRN):  acetaminophen   Tablet .. 650 milliGRAM(s) Oral every 6 hours PRN Mild Pain (1 - 3)  polyethylene glycol 3350 17 Gram(s) Oral daily PRN Constipation  senna 2 Tablet(s) Oral at bedtime PRN Constipation      Vital Signs Last 24 Hrs  T(C): 37.1 (11 Aug 2020 09:04), Max: 37.1 (10 Aug 2020 21:24)  T(F): 98.8 (11 Aug 2020 09:04), Max: 98.8 (11 Aug 2020 09:04)  HR: 84 (11 Aug 2020 09:04) (65 - 84)  BP: 118/79 (11 Aug 2020 09:04) (109/70 - 125/72)  BP(mean): --  RR: 15 (11 Aug 2020 09:04) (15 - 16)  SpO2: 97% (11 Aug 2020 09:04) (94% - 97%)  CAPILLARY BLOOD GLUCOSE        I&O's Summary    10 Aug 2020 07:  -  11 Aug 2020 07:00  --------------------------------------------------------  IN: 600 mL / OUT: 901 mL / NET: -301 mL    11 Aug 2020 07:01  -  11 Aug 2020 10:35  --------------------------------------------------------  IN: 300 mL / OUT: 300 mL / NET: 0 mL        PHYSICAL EXAM:  GENERAL: NAD, well-developed  CHEST/LUNG: Clear to auscultation bilaterally; No wheeze  HEART: Regular rate and rhythm; No murmurs, rubs, or gallops  ABDOMEN: Soft, Nontender, Nondistended; Bowel sounds present  EXTREMITIES:  2+ Peripheral Pulses, No clubbing, cyanosis, or edema  PSYCH: AAOx3  NEUROLOGY: non-focal  SKIN: No rashes or lesions    LABS:                        13.1   8.38  )-----------( 259      ( 10 Aug 2020 07:30 )             39.7     08-10    136  |  100  |  17  ----------------------------<  90  4.4   |  29  |  1.09    Ca    8.6      10 Aug 2020 07:30            Urinalysis Basic - ( 09 Aug 2020 12:45 )    Color: Yellow / Appearance: Clear / S.010 / pH: x  Gluc: x / Ketone: Negative  / Bili: Negative / Urobili: Negative   Blood: x / Protein: Negative / Nitrite: Negative   Leuk Esterase: Negative / RBC: x / WBC x   Sq Epi: x / Non Sq Epi: x / Bacteria: x        RADIOLOGY & ADDITIONAL TESTS:  Results Reviewed:   Imaging Personally Reviewed:  Electrocardiogram Personally Reviewed:    COORDINATION OF CARE:  Care Discussed with Consultants/Other Providers [Y/N]:  Prior or Outpatient Records Reviewed [Y/N]:

## 2020-08-12 PROCEDURE — 90837 PSYTX W PT 60 MINUTES: CPT

## 2020-08-12 PROCEDURE — 99232 SBSQ HOSP IP/OBS MODERATE 35: CPT

## 2020-08-12 RX ADMIN — Medication 250 MILLIGRAM(S): at 06:00

## 2020-08-12 RX ADMIN — ENOXAPARIN SODIUM 40 MILLIGRAM(S): 100 INJECTION SUBCUTANEOUS at 11:27

## 2020-08-12 RX ADMIN — SIMVASTATIN 10 MILLIGRAM(S): 20 TABLET, FILM COATED ORAL at 21:18

## 2020-08-12 RX ADMIN — Medication 250 MILLIGRAM(S): at 17:11

## 2020-08-12 RX ADMIN — FINASTERIDE 5 MILLIGRAM(S): 5 TABLET, FILM COATED ORAL at 11:27

## 2020-08-12 NOTE — PROGRESS NOTE ADULT - COMMENTS
Patient seen with PT. Per nursing, patient has not had incontinence episodes for last 2 days. Seems to be imrpvoing in therapies as well, however still has reduced initiation and overall problem-solving and insight. Family friend requested evaluation with RW, but noted to have difficulty multi-tasking, with reduced attention and manipulation of walker; patient feels safer without, and appears that way during trial

## 2020-08-12 NOTE — PROGRESS NOTE ADULT - ASSESSMENT
DENA FARNSWORTH is a 76yo right-handed male with PMHx of BPH, HTN, and HLD who presented to [facility] on 07/21/2020 with intermittent b/l UE and LE spasms and weakness, found to have Limbic Encephalitis and temporal seizures s/p PLEX x5 and Rituxan x1. Admitted for multidisciplinary rehab program.     #Debility  -Comprehensive Multidisciplinary Rehab Program: Gait, ADLs  -PT/OT    #Limbic encephalitis  -s/p PLEX x5, last session 7/31  -s/p Rituxan x1, on 8/3  -c/w Valproic acid 250mg BID  -Outpatient follow up with Neurology  -Will require monthly IVIG and Rituxan every 6 months    #HTN  -Overall well controlled off antihypertensive medications with one or 2 episodes of HTN with SBP>140  -If persistently elevated, will consider starting Amlodipine 5mg daily  -Continue to hold home med Amlodipine 5mg for now  -Continue to monitor vitals    #Bradycardia  -Asymptomatic  -ECHO reviewed; Normal  -Avoid using BBlockers   -Continue to monitor vitals    #HLD  - c/w simvastatin 10mg qhs    #BPH  - c/w finasteride 5mg once daily  -monitor voiding, PVRs    #Leukocytosis  -Resolve  -Likely reactive, not infectious  -No need to monitor daily labs    #DVT prophylaxis:  - Lovenox 40mg once daily

## 2020-08-12 NOTE — PROGRESS NOTE ADULT - SUBJECTIVE AND OBJECTIVE BOX
Tho Brewer M.D. Pager Number [phone]    Patient is a 75y old  Male who presents with a chief complaint of Limbic encephalitis and temporal seizures (11 Aug 2020 13:11)      SUBJECTIVE / OVERNIGHT EVENTS:  Pt seen and examined at bedside. No acute events overnight.  Pt denies cp, palpitations, sob, abd pain, N/V, fever, chills.    ROS:  All other review of systems negative    Allergies    No Known Allergies    Intolerances        MEDICATIONS  (STANDING):  enoxaparin Injectable 40 milliGRAM(s) SubCutaneous daily  finasteride 5 milliGRAM(s) Oral daily  simvastatin 10 milliGRAM(s) Oral at bedtime  valproic acid 250 milliGRAM(s) Oral two times a day    MEDICATIONS  (PRN):  acetaminophen   Tablet .. 650 milliGRAM(s) Oral every 6 hours PRN Mild Pain (1 - 3)  polyethylene glycol 3350 17 Gram(s) Oral daily PRN Constipation  senna 2 Tablet(s) Oral at bedtime PRN Constipation      Vital Signs Last 24 Hrs  T(C): 36.7 (11 Aug 2020 20:54), Max: 36.7 (11 Aug 2020 20:54)  T(F): 98.1 (11 Aug 2020 20:54), Max: 98.1 (11 Aug 2020 20:54)  HR: 88 (12 Aug 2020 09:02) (66 - 88)  BP: 113/72 (12 Aug 2020 09:02) (113/72 - 123/65)  BP(mean): --  RR: 15 (12 Aug 2020 09:02) (15 - 16)  SpO2: 97% (12 Aug 2020 09:02) (95% - 97%)  CAPILLARY BLOOD GLUCOSE        I&O's Summary    11 Aug 2020 07:01  -  12 Aug 2020 07:00  --------------------------------------------------------  IN: 600 mL / OUT: 600 mL / NET: 0 mL        PHYSICAL EXAM:  GENERAL: NAD, well-developed  CHEST/LUNG: Clear to auscultation bilaterally; No wheeze  HEART: Regular rate and rhythm; No murmurs, rubs, or gallops  ABDOMEN: Soft, Nontender, Nondistended; Bowel sounds present  EXTREMITIES:  2+ Peripheral Pulses, No clubbing, cyanosis, or edema  PSYCH: AAOx3  NEUROLOGY: non-focal  SKIN: No rashes or lesions    LABS:                    RADIOLOGY & ADDITIONAL TESTS:  Results Reviewed:   Imaging Personally Reviewed:  Electrocardiogram Personally Reviewed:    COORDINATION OF CARE:  Care Discussed with Consultants/Other Providers [Y/N]:  Prior or Outpatient Records Reviewed [Y/N]:

## 2020-08-12 NOTE — PROGRESS NOTE ADULT - ASSESSMENT
Pt appears to be competent to decide on discharge home matters, even if partially. He is able to express and maintain a choice, i.e., not hiring external supervision at home. He appears able to understand relevant information about his condition, recommended tx, risks/benefits of supervision and lack of it. He also appears able to appreciate both his situation and implications including the risk of falls and further injury. However, he is unable to exercise the best reasoning when it comes about doing what is prescribed to prevent potential risks and prefers to take chances and to avoid what he perceives as intrusion into his private life. He remains open to having help from a trusted friend or to hire someone not belonging to a home care agency. Pt's reasoning does not appear to be due to mood disorder or psychosis, instead it appears to be based on previous negative experiences and/or premorbid personality factors.    Feedback was provided to social work. It may be important to document Pt's refusal to have external supervision in the discharge documentation.

## 2020-08-12 NOTE — PROGRESS NOTE ADULT - ASSESSMENT
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 Admitted for multidisciplinary rehab program.     #Comprehensive Multidisciplinary Rehab Program: Gait, ADL, Functional impairments secondary to limbic encephalitis and temporal SZ  - continue PT/OT/SLP 3 hours a day 5 days a week  - neuropsychology evaluation appreciated 8/6:  mild cognitive deficits with difficulties in concentration, short-term memory for verbal information, visuospatial skills and aspects of executive functions (initiation/processing speed, problem solving) and language (fluency). Will need supervision in community on discharge . Friend is unable to provide level of supervision recommended, information re: private hire companions also provided by   -neuropsychology assessment of capacity to make decisions re: discharge ( specifically re: refusal to hire/obtain additional supervision at home) requested  -recreation therapy   Prec: fall, SZ, cognition    #Limbic encephalitis  - s/p PLEX x5, last session 7/31  - s/p Rituxan x1, on 8/3 and IV solumedrol  - c/w Valproic acid 250mg BID (plan to taper once DC from rehab with neuro f/u)  - per neuro, will require monthly IVIG and Rituxan every 6 months  -depakote level 8/10-->43. Will give extra dose 500 mg tonight given +EEG findings and continue 250 bid as plan is for eventual dc  -neuro f/u on dc    #leukocytosis: resolved  WBC-->8.38 8/10 resolved  -PCP f/u on dc    #PIEDAD  -elevation BUN/Cr 27/1.15 8/6  -encourage po fluids  -BMP 8/10-->17/1.09 improved  BMp 8/13    #HTN  - on amlodipine 5mg once daily at home  - stable off medications   -PCP f/u on dc    #HLD  - c/w simvastatin 10mg qhs    #BPH/  - c/w finasteride 5mg once daily   -periods of incontinence. UA 8/9 negative  -toileting schedule, frequent rounds. Toilet before bedtime, discussed with patient as well    #Pain:  - Tylenol PRN    #GI/Bowel:  - Senna 2 tabs daily, Miralax once daily PRN    #Diet:    - Regular    #DVT prophylaxis:  - Lovenox 40mg once daily  - SCDs    #Case discussedi n IDT rounds 8/10:  Patient requires supervision/CG ambulation, reduced balance, supervision LE dressing, min assist bathing and toileting, CG tub transfers. Reduced initiation and difficulties in sequencing, reduced insight. Periods incontinence B/B and lives alone  -goals: supervision all bADLs and transfers. Recommend supervision iADLs and during waking hours. Will need to discuss possible hiring  on dc  -[facility] dc home 8/13 with home Pt, OT, SLP and supervision during waking hours    #Labs:  neuropsychology assessment capacity to make decisions re: dc recommendations  [facility] 8/13  --------------------------------------------  Outpatient Follow up:  Neurology - Dr. Scott  PMD - Dr. Ovalle  --------------------------------------------

## 2020-08-12 NOTE — PROGRESS NOTE ADULT - SUBJECTIVE AND OBJECTIVE BOX
Patient is a 75y old  Male who presents with a chief complaint of Limbic encephalitis and temporal seizures (12 Aug 2020 09:06)      HPI:  Shawn Alan is a 76yo right-handed male with PMHx of  HTN, HLD, and BPH who presented to [facility] 7/21/20 with frequent, intermittent left upper extremity spasms and weakness since the first week of June that progressed to include his right upper and lower extremity since late June, interfering with daily activities. Patient reports left ear pain prior to symptoms followed by stiffness of his left upper extremity that he describes as spasms, accompanied by shaking. He then noted involvement of his right upper and right lower extremity. The shaking was usually followed by weakness, which lasted a few minutes before regaining full function. He's broken dishes multiple times, as holding a weighted object may precipitate these episodes. He's also suffered multiple falls since June, most recently a couple weeks prior to admission that he attributes to loss of balance. He denied any syncope, loss of consciousness, tongue biting, or urinary incontinence, history of a neurological disorder or seizures. He lives alone and says that the episodes have not been witnessed.    Patient had an MRI brain without contrast after going to a private neurologist which found: Asymmetric FLAIR hyperintensity in the right limbic lobe involving the hippocampal gyrus. Differential diagnosis includes, but is not limited to, infectious/inflammatory encephalitis, limbic encephalitis and sequela of epilepsy/post ictal edema. Additional mild chronic microvascular changes without acute infarct.    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 He underwent an MRI at [facility], which noted redemonstration of hyperintense signals within medial right temporal lobe with associated subtle swelling. EEG was performed and noted electrographical seizures in the right temporal region with spread to the left. LP was performed with no findings c/w infection; CSF studies for encephalopthy and paraneoplastic panel are pending. On bloodwork, patient was found to be LGI1 positive(1:160) and was started on PLEX on 7/23, now s/p 5 doses, last dose 7/31. In addition, he also received Rituxan (rest to continue as outpatient) on 8/3 with IV Solumedrol.    Of note, patient also had CT chest, abdomen, and pelvis, which showed chronic tree-in-bud opacities. Pulmonology was consulted, as there was concern encephalitis may precede tumor progression up to 2 years. Per pulmonology, no suspicion of active infection or pulmonary neoplastic process; no need for f/u CT imaging. Hospital course was also c/b bradycardia, but VS otherwise were stable.     Patient was evaluated by PM&R and therapy for functional deficits and gait/ ADL impairments and recommended acute rehabilitation. Patient was medically optimized for discharge to [facility] on 08/04/2020. (04 Aug 2020 14:05)      PAST MEDICAL & SURGICAL HISTORY:  Hypertension  HLD (hyperlipidemia)  BPH (benign prostatic hyperplasia)  No significant past surgical history      MEDICATIONS  (STANDING):  enoxaparin Injectable 40 milliGRAM(s) SubCutaneous daily  finasteride 5 milliGRAM(s) Oral daily  simvastatin 10 milliGRAM(s) Oral at bedtime  valproic acid 250 milliGRAM(s) Oral two times a day    MEDICATIONS  (PRN):  acetaminophen   Tablet .. 650 milliGRAM(s) Oral every 6 hours PRN Mild Pain (1 - 3)  polyethylene glycol 3350 17 Gram(s) Oral daily PRN Constipation  senna 2 Tablet(s) Oral at bedtime PRN Constipation      Allergies    No Known Allergies    Intolerances          VITALS  75y  Vital Signs Last 24 Hrs  T(C): 36.7 (11 Aug 2020 20:54), Max: 36.7 (11 Aug 2020 20:54)  T(F): 98.1 (11 Aug 2020 20:54), Max: 98.1 (11 Aug 2020 20:54)  HR: 88 (12 Aug 2020 09:02) (66 - 88)  BP: 113/72 (12 Aug 2020 09:02) (113/72 - 123/65)  BP(mean): --  RR: 15 (12 Aug 2020 09:02) (15 - 16)  SpO2: 97% (12 Aug 2020 09:02) (95% - 97%)  Daily     Daily         RECENT LABS:                      CAPILLARY BLOOD GLUCOSE

## 2020-08-12 NOTE — PROGRESS NOTE ADULT - SUBJECTIVE AND OBJECTIVE BOX
Pt was seen for 45 min. Pt had no complaints. Session was focused on assessing decisional capacity, based on the tx teams concern about Pt's refusal to hire or obtain additional supervision at home.     Explained to Pt the nature and purpose of the evaluation, provided information to Pt about his diagnosis, treatment provided in AR, and his options regarding supervision (friend, homecare).  Pt was asked to express his choice and he said bluntly that he was not interested in having supervision. Pt's ability to understand relevant information was explored and he was able to report having an encephalitis that was causing him spasms and falling accidents; he acknowledge that supervision was recommended; he stated that risks of having supervision from home was having to deal with another person at home; he expressed awareness that not having supervision at home could be to have spasms or falls. However, he stated that he would rather run the risk of falling than having someone he doesn't know at home. On the other hand, he expressed willingness to receive assistance from his friend (Belinda) or to hire an assistant who is not part of an agency.     His ability to appreciate the situation and its consequences was also assessed. Pt acknowledges that he has a brain disorder, and that supervision may be beneficial, but does not feel he needs to be supervised at all times. He thinks that supervision will be helpful if provided by someone of his choosing and when he chooses it. He does not feel that supervision is critical. Pt is aware that he may fall but is willing to accept the risk, even after told that risk could involve things like head injury or a hip fx. He is aware that the recommendation for supervision was made out of concern for his safety.    His ability to reason was explored. His reasons to object to supervision were based on alleged negative experiences with home care agencies in the past when he looked for caregivers for his  parents. Rather than taking the time to think through his reasons he insisted on not wanting to deal with anybody at home.     After exploring the previous abilities, Pt again stated his choice of not having someone to supervise him at home, except for his friend Belinda or any one else not connected with an agency.    Pt was alert, attentive, oriented Ox3, constricted/irritable affect, euthymic mood, denied AH/VH, denied SI/HI/I/P, though processes - goal oriented, no abnormal thought contents observed (however, Pt appear to overvalue his privacy).  No noticeable changes were noted in his presentation, cognitive screening was skipped.

## 2020-08-13 LAB
ANION GAP SERPL CALC-SCNC: 7 MMOL/L — SIGNIFICANT CHANGE UP (ref 5–17)
BUN SERPL-MCNC: 18 MG/DL — SIGNIFICANT CHANGE UP (ref 7–23)
CALCIUM SERPL-MCNC: 8.4 MG/DL — SIGNIFICANT CHANGE UP (ref 8.4–10.5)
CHLORIDE SERPL-SCNC: 101 MMOL/L — SIGNIFICANT CHANGE UP (ref 96–108)
CO2 SERPL-SCNC: 26 MMOL/L — SIGNIFICANT CHANGE UP (ref 22–31)
CREAT SERPL-MCNC: 1.07 MG/DL — SIGNIFICANT CHANGE UP (ref 0.5–1.3)
GLUCOSE SERPL-MCNC: 84 MG/DL — SIGNIFICANT CHANGE UP (ref 70–99)
HCT VFR BLD CALC: 37.2 % — LOW (ref 39–50)
HGB BLD-MCNC: 12.6 G/DL — LOW (ref 13–17)
MCHC RBC-ENTMCNC: 30.1 PG — SIGNIFICANT CHANGE UP (ref 27–34)
MCHC RBC-ENTMCNC: 33.9 GM/DL — SIGNIFICANT CHANGE UP (ref 32–36)
MCV RBC AUTO: 89 FL — SIGNIFICANT CHANGE UP (ref 80–100)
NRBC # BLD: 0 /100 WBCS — SIGNIFICANT CHANGE UP (ref 0–0)
PLATELET # BLD AUTO: 244 K/UL — SIGNIFICANT CHANGE UP (ref 150–400)
POTASSIUM SERPL-MCNC: 4.5 MMOL/L — SIGNIFICANT CHANGE UP (ref 3.5–5.3)
POTASSIUM SERPL-SCNC: 4.5 MMOL/L — SIGNIFICANT CHANGE UP (ref 3.5–5.3)
RBC # BLD: 4.18 M/UL — LOW (ref 4.2–5.8)
RBC # FLD: 13.5 % — SIGNIFICANT CHANGE UP (ref 10.3–14.5)
SODIUM SERPL-SCNC: 134 MMOL/L — LOW (ref 135–145)
WBC # BLD: 7.19 K/UL — SIGNIFICANT CHANGE UP (ref 3.8–10.5)
WBC # FLD AUTO: 7.19 K/UL — SIGNIFICANT CHANGE UP (ref 3.8–10.5)

## 2020-08-13 PROCEDURE — 99233 SBSQ HOSP IP/OBS HIGH 50: CPT

## 2020-08-13 PROCEDURE — 99232 SBSQ HOSP IP/OBS MODERATE 35: CPT

## 2020-08-13 RX ADMIN — Medication 250 MILLIGRAM(S): at 17:02

## 2020-08-13 RX ADMIN — FINASTERIDE 5 MILLIGRAM(S): 5 TABLET, FILM COATED ORAL at 12:05

## 2020-08-13 RX ADMIN — Medication 250 MILLIGRAM(S): at 05:42

## 2020-08-13 RX ADMIN — SIMVASTATIN 10 MILLIGRAM(S): 20 TABLET, FILM COATED ORAL at 21:13

## 2020-08-13 RX ADMIN — ENOXAPARIN SODIUM 40 MILLIGRAM(S): 100 INJECTION SUBCUTANEOUS at 12:04

## 2020-08-13 NOTE — PROGRESS NOTE ADULT - ASSESSMENT
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 Admitted for multidisciplinary rehab program.     #Debility  -Comprehensive Multidisciplinary Rehab Program: Gait, ADLs  -PT/OT    #Limbic encephalitis  -s/p PLEX x5, last session 7/31  -s/p Rituxan x1, on 8/3  -c/w Valproic acid 250mg BID  -Outpatient follow up with Neurology  -Will require monthly IVIG and Rituxan every 6 months    #HTN  -Overall well controlled off antihypertensive medications  -Recommend discharge home without home antihypertensive medication; Amlodipine 5mg  -Continue to monitor vitals    #Bradycardia  -Asymptomatic  -ECHO reviewed; Normal  -Avoid using BBlockers   -Continue to monitor vitals    #HLD  - c/w simvastatin 10mg qhs    #BPH  - c/w finasteride 5mg once daily  -monitor voiding, PVRs    #Leukocytosis  -Resolve  -Likely reactive, not infectious  -No need to monitor daily labs    #DVT prophylaxis:  - Lovenox 40mg once daily    Cleared for discharge from medical standpoint

## 2020-08-13 NOTE — CHART NOTE - NSCHARTNOTEFT_GEN_A_CORE
called by team to see pt as he will be staying due to altered sensorium. Pt's record was reviewed. Phone call was made to Dr. Justice regarding the altered sensorium and his LG1 encephalitis. She was not concerned about seizure activity and didn't recommend increasing depakote. She advised following to see if he improves tomorrow. If not, then she will accept him for transfer to [facility].

## 2020-08-13 NOTE — PROGRESS NOTE ADULT - ASSESSMENT
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 Admitted for multidisciplinary rehab program.     #Comprehensive Multidisciplinary Rehab Program: Gait, ADL, Functional impairments secondary to limbic encephalitis and temporal SZ  - continue PT/OT/SLP 3 hours a day 5 days a week  - neuropsychology evaluation appreciated 8/6:  mild cognitive deficits with difficulties in concentration, short-term memory for verbal information, visuospatial skills and aspects of executive functions (initiation/processing speed, problem solving) and language (fluency). Will need supervision in community on discharge . Friend is unable to provide level of supervision recommended, information re: private hire companions also provided by   -neuropsychology assessment of capacity to make decisions re: discharge ( specifically re: refusal to hire/obtain additional supervision at home) requested  -recreation therapy   Prec: fall, SZ, cognition    #Limbic encephalitis  - s/p PLEX x5, last session 7/31  - s/p Rituxan x1, on 8/3 and IV solumedrol  - c/w Valproic acid 250mg BID (plan to taper once DC from rehab with neuro f/u)  - per neuro, will require monthly IVIG and Rituxan every 6 months  -depakote level 8/10-->43. Will give extra dose 500 mg tonight given +EEG findings and continue 250 bid as plan is for eventual dc  -neuro f/u on dc    #leukocytosis: resolved  WBC-->8.38 8/10 resolved  -PCP f/u on dc    #PIEDAD  -elevation BUN/Cr 27/1.15 8/6  -encourage po fluids  -BMP 8/10-->17/1.09 improved  BMp 8/13    #HTN  - on amlodipine 5mg once daily at home  - stable off medications   -PCP f/u on dc    #HLD  - c/w simvastatin 10mg qhs    #BPH/  - c/w finasteride 5mg once daily   -periods of incontinence. UA 8/9 negative  -toileting schedule, frequent rounds. Toilet before bedtime, discussed with patient as well    #Pain:  - Tylenol PRN    #GI/Bowel:  - Senna 2 tabs daily, Miralax once daily PRN    #Diet:    - Regular    #DVT prophylaxis:  - Lovenox 40mg once daily  - SCDs    #Case discussedi n IDT rounds 8/10:  Patient requires supervision/CG ambulation, reduced balance, supervision LE dressing, min assist bathing and toileting, CG tub transfers. Reduced initiation and difficulties in sequencing, reduced insight. Periods incontinence B/B and lives alone  -goals: supervision all bADLs and transfers. Recommend supervision iADLs and during waking hours. Will need to discuss possible hiring  on dc  -[employer] dc home 8/13 with home Pt, OT, SLP and supervision during waking hours    #Labs:  neuropsychology assessment capacity to make decisions re: dc recommendations  [facility] 8/13  --------------------------------------------  Outpatient Follow up:  Neurology - Dr. Scott  PMD - Dr. 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 Admitted for multidisciplinary rehab program.     #Comprehensive Multidisciplinary Rehab Program: Gait, ADL, Functional impairments secondary to limbic encephalitis and temporal SZ  - continue PT/OT/SLP 3 hours a day 5 days a week  - neuropsychology f/u 8/12 read and appreciated: Pt appears to be competent to decide on discharge home matters, even if partially. He is able to express and maintain a choice, i.e., not hiring external supervision at home. He appears able to understand relevant information about his condition, recommended tx, risks/benefits of supervision and lack of it. He also appears able to appreciate both his situation and implications including the risk of falls and further injury.  Prec: fall, SZ, cognition    #Limbic encephalitis  - s/p PLEX x5, last session 7/31  - s/p Rituxan x1, on 8/3 and IV solumedrol  - c/w Valproic acid 250mg BID (plan to taper once DC from rehab with neuro f/u)  -depakote level 8/10-->43. Will give extra dose 500 mg tonight given +EEG findings and continue 250 bid as plan is for eventual dc  - per neuro, will require monthly IVIG and Rituxan every 6 months. Patient appears more confused and had dystonic episode with PT this morning. Will hold dc and request neuro follow up re: need for IVIG dose prior to dc    #leukocytosis: resolved  WBC-->8.38 8/10-->7.19 8/13  -PCP f/u on dc    #PIEDAD  -resolved  -BMP 8/10-->17/1.09 i-->18/1.07 8/13    #hyponatremia  -Na 134  -recheck BMP 8/14    #HTN  - on amlodipine 5mg once daily at home  - stable off medications   -PCP f/u on dc    #HLD  - c/w simvastatin 10mg qhs    #BPH/  - c/w finasteride 5mg once daily   -periods of incontinence. UA 8/9 negative  -toileting schedule, frequent rounds. Toilet before bedtime, discussed with patient as well    #Pain:  - Tylenol PRN    #GI/Bowel:  - Senna 2 tabs daily, Miralax once daily PRN    #Diet:    - Regular    #DVT prophylaxis:  - Lovenox 40mg once daily  - SCDs    #Case discussedi n IDT rounds 8/10:  Patient requires supervision/CG ambulation, reduced balance, supervision LE dressing, min assist bathing and toileting, CG tub transfers. Reduced initiation and difficulties in sequencing, reduced insight. Periods incontinence B/B and lives alone  -goals: supervision all bADLs and transfers. Recommend supervision iADLs and during waking hours. Will need to discuss possible hiring  on dc  -Recommended for supervision all waking hours on dc, including assistance for finances, meals, medications and appointments. not cleared for driving due to dystonia, SZ and cognitive impairment also discussed in detail.     #Labs:  neurology  [facility] 8/14  --------------------------------------------  Outpatient Follow up:  Neurology - Dr. Scott  PMD - Dr. 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 Admitted for multidisciplinary rehab program.     #Comprehensive Multidisciplinary Rehab Program: Gait, ADL, Functional impairments secondary to limbic encephalitis and temporal SZ  - continue PT/OT/SLP 3 hours a day 5 days a week  - neuropsychology f/u 8/12 read and appreciated: Pt appears to be competent to decide on discharge home matters, even if partially. He is able to express and maintain a choice, i.e., not hiring external supervision at home. He appears able to understand relevant information about his condition, recommended tx, risks/benefits of supervision and lack of it. He also appears able to appreciate both his situation and implications including the risk of falls and further injury.  Prec: fall, SZ, cognition    #Limbic encephalitis  - s/p PLEX x5, last session 7/31  - s/p Rituxan x1, on 8/3 and IV solumedrol  - c/w Valproic acid 250mg BID (plan to taper once DC from rehab with neuro f/u)  -depakote level 8/10-->43. Will give extra dose 500 mg tonight given +EEG findings and continue 250 bid as plan is for eventual dc  - per neuro, will require monthly IVIG and Rituxan every 6 months. Patient appears more confused and had dystonic episode with PT this morning. Will hold dc and request neuro follow up re: need for IVIG dose prior to dc    #leukocytosis: resolved  WBC-->8.38 8/10-->7.19 8/13  -PCP f/u on dc    #PIEDAD  -resolved  -BMP 8/10-->17/1.09 i-->18/1.07 8/13    #hyponatremia  -Na 134  -recheck BMP 8/14    #HTN  - on amlodipine 5mg once daily at home  - stable off medications   -PCP f/u on dc    #HLD  - c/w simvastatin 10mg qhs    #BPH/  - c/w finasteride 5mg once daily   -periods of incontinence. UA 8/9 negative  -toileting schedule, frequent rounds. Toilet before bedtime, discussed with patient as well    #Pain:  - Tylenol PRN    #GI/Bowel:  - Senna 2 tabs daily, Miralax once daily PRN    #Diet:    - Regular    #DVT prophylaxis:  - Lovenox 40mg once daily  - SCDs    #Case discussedi n IDT rounds 8/10:  Patient requires supervision/CG ambulation, reduced balance, supervision LE dressing, min assist bathing and toileting, CG tub transfers. Reduced initiation and difficulties in sequencing, reduced insight. Periods incontinence B/B and lives alone  -goals: supervision all bADLs and transfers. Recommend supervision iADLs and during waking hours. Will need to discuss possible hiring  on dc  -Recommended for supervision all waking hours on dc, including assistance for finances, meals, medications and appointments. not cleared for driving due to dystonia, SZ and cognitive impairment also discussed in detail  -Case discussed in great detail with HCP Belinda Lawson [phone]    #Labs:  neurology  BMP 8/14  --------------------------------------------  Outpatient Follow up:  Neurology - Dr. Scott  PMD - Dr. Ovalle  -------------------------------------------- DENA FARNSWORTH is a 76yo right-handed male with PMHx of BPH, HTN, and HLD who presented to 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 Admitted for multidisciplinary rehab program.     #Comprehensive Multidisciplinary Rehab Program: Gait, ADL, Functional impairments secondary to limbic encephalitis and temporal SZ  - continue PT/OT/SLP 3 hours a day 5 days a week  - neuropsychology f/u 8/12 read and appreciated: Pt appears to be competent to decide on discharge home matters, even if partially. He is able to express and maintain a choice, i.e., not hiring external supervision at home. He appears able to understand relevant information about his condition, recommended tx, risks/benefits of supervision and lack of it. He also appears able to appreciate both his situation and implications including the risk of falls and further injury.  Prec: fall, SZ, cognition    #Limbic encephalitis  - s/p PLEX x5, last session 7/31  - s/p Rituxan x1, on 8/3 and IV solumedrol  - c/w Valproic acid 250mg BID (plan to taper once DC from rehab with neuro f/u)  -depakote level 8/10-->43. Will give extra dose 500 mg tonight given +EEG findings and continue 250 bid as plan is for eventual dc  - per neuro recommendations from [facility] on transfer, plan for monthly IVIG and Rituxan every 6 months. Patient appears more confused and had dystonic episode with PT this morning. Will hold dc and request neuro follow up at  re: need for IVIG dose prior to dc    #leukocytosis: resolved  WBC-->8.38 8/10-->7.19 8/13  -PCP f/u on dc    #PIEDAD  -resolved  -BMP 8/10-->17/1.09 i-->18/1.07 8/13    #hyponatremia  -Na 134  -recheck BMP 8/14    #HTN  - on amlodipine 5mg once daily at home  - stable off medications   -PCP f/u on dc    #HLD  - c/w simvastatin 10mg qhs    #BPH/  - c/w finasteride 5mg once daily   -periods of incontinence. UA 8/9 negative  -toileting schedule, frequent rounds. Toilet before bedtime, discussed with patient as well    #Pain:  - Tylenol PRN    #GI/Bowel:  - Senna 2 tabs daily, Miralax once daily PRN    #Diet:    - Regular    #DVT prophylaxis:  - Lovenox 40mg once daily  - SCDs    #Case discussedi n IDT rounds 8/10:  Patient requires supervision/CG ambulation, reduced balance, supervision LE dressing, min assist bathing and toileting, CG tub transfers. Reduced initiation and difficulties in sequencing, reduced insight. Periods incontinence B/B and lives alone  -goals: supervision all bADLs and transfers. Recommend supervision iADLs and during waking hours. Will need to discuss possible hiring  on dc  -Recommended for supervision all waking hours on dc, including assistance for finances, meals, medications and appointments. not cleared for driving due to dystonia, SZ and cognitive impairment also discussed in detail  -Case discussed in great detail with HCP Belinda Lawson [phone]    #Labs:  neurology  [facility] 8/14  --------------------------------------------  Outpatient Follow up:  Neurology - Dr. Scott  PMD - Dr. Ovalle  --------------------------------------------

## 2020-08-13 NOTE — PROGRESS NOTE ADULT - SUBJECTIVE AND OBJECTIVE BOX
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 Patient reports left ear pain prior to symptoms followed by stiffness of his left upper extremity that he describes as spasms, accompanied by shaking. He then noted involvement of his right upper and right lower extremity. The shaking was usually followed by weakness, which lasted a few minutes before regaining full function. He's broken dishes multiple times, as holding a weighted object may precipitate these episodes. He's also suffered multiple falls since June, most recently a couple weeks prior to admission that he attributes to loss of balance. He denied any syncope, loss of consciousness, tongue biting, or urinary incontinence, history of a neurological disorder or seizures. He lives alone and says that the episodes have not been witnessed.    Patient had an MRI brain without contrast after going to a private neurologist which found: Asymmetric FLAIR hyperintensity in the right limbic lobe involving the hippocampal gyrus. Differential diagnosis includes, but is not limited to, infectious/inflammatory encephalitis, limbic encephalitis and sequela of epilepsy/post ictal edema. Additional mild chronic microvascular changes without acute infarct.    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 He underwent an MRI at [facility], which noted redemonstration of hyperintense signals within medial right temporal lobe with associated subtle swelling. EEG was performed and noted electrographical seizures in the right temporal region with spread to the left. LP was performed with no findings c/w infection; CSF studies for encephalopthy and paraneoplastic panel are pending. On bloodwork, patient was found to be LGI1 positive(1:160) and was started on PLEX on 7/23, now s/p 5 doses, last dose 7/31. In addition, he also received Rituxan (rest to continue as outpatient) on 8/3 with IV Solumedrol.    Of note, patient also had CT chest, abdomen, and pelvis, which showed chronic tree-in-bud opacities. Pulmonology was consulted, as there was concern encephalitis may precede tumor progression up to 2 years. Per pulmonology, no suspicion of active infection or pulmonary neoplastic process; no need for f/u CT imaging. Hospital course was also c/b bradycardia, but VS otherwise were stable.     Patient was evaluated by PM&R and therapy for functional deficits and gait/ ADL impairments and recommended acute rehabilitation. Patient was medically optimized [facility] 08[facility]04[facility]2020. (04 Aug 2020 14:05)      PAST MEDICAL & SURGICAL HISTORY:  Hypertension  HLD (hyperlipidemia)  BPH (benign prostatic hyperplasia)  No significant past surgical history      MEDICATIONS  (STANDING):  enoxaparin Injectable 40 milliGRAM(s) SubCutaneous daily  finasteride 5 milliGRAM(s) Oral daily  simvastatin 10 milliGRAM(s) Oral at bedtime  valproic acid 250 milliGRAM(s) Oral two times a day    MEDICATIONS  (PRN):  acetaminophen   Tablet .. 650 milliGRAM(s) Oral every 6 hours PRN Mild Pain (1 - 3)  polyethylene glycol 3350 17 Gram(s) Oral daily PRN Constipation  senna 2 Tablet(s) Oral at bedtime PRN Constipation      Allergies    No Known Allergies    Intolerances          VITALS  75y  Vital Signs Last 24 Hrs  T(C): 36.5 (13 Aug 2020 09:27), Max: 36.8 (12 Aug 2020 21:16)  T(F): 97.7 (13 Aug 2020 09:27), Max: 98.2 (12 Aug 2020 21:16)  HR: 56 (13 Aug 2020 09:27) (56 - 61)  BP: 136/76 (13 Aug 2020 09:27) (109/56 - 136/76)  BP(mean): --  RR: 15 (13 Aug 2020 09:27) (15 - 15)  SpO2: 95% (13 Aug 2020 09:27) (95% - 96%)  Daily     Daily         RECENT LABS:                          12.6   7.19  )-----------( 244      ( 13 Aug 2020 05:30 )             37.2     08-13    134<L>  |  101  |  18  ----------------------------<  84  4.5   |  26  |  1.07    Ca    8.4      13 Aug 2020 05:30                CAPILLARY BLOOD GLUCOSE

## 2020-08-13 NOTE — PROGRESS NOTE ADULT - SUBJECTIVE AND OBJECTIVE BOX
Tho Brewer M.D. 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 No acute events overnight.  Pt denies cp, palpitations, sob, abd pain, N/V, fever, chills.    ROS:  All other review of systems negative    Allergies    No Known Allergies    Intolerances        MEDICATIONS  (STANDING):  enoxaparin Injectable 40 milliGRAM(s) SubCutaneous daily  finasteride 5 milliGRAM(s) Oral daily  simvastatin 10 milliGRAM(s) Oral at bedtime  valproic acid 250 milliGRAM(s) Oral two times a day    MEDICATIONS  (PRN):  acetaminophen   Tablet .. 650 milliGRAM(s) Oral every 6 hours PRN Mild Pain (1 - 3)  polyethylene glycol 3350 17 Gram(s) Oral daily PRN Constipation  senna 2 Tablet(s) Oral at bedtime PRN Constipation      Vital Signs Last 24 Hrs  T(C): 36.8 (12 Aug 2020 21:16), Max: 36.8 (12 Aug 2020 21:16)  T(F): 98.2 (12 Aug 2020 21:16), Max: 98.2 (12 Aug 2020 21:16)  HR: 61 (12 Aug 2020 21:16) (61 - 61)  BP: 109/56 (12 Aug 2020 21:16) (109/56 - 109/56)  BP(mean): --  RR: 15 (12 Aug 2020 21:16) (15 - 15)  SpO2: 96% (12 Aug 2020 21:16) (96% - 96%)  CAPILLARY BLOOD GLUCOSE        I&O's Summary    12 Aug 2020 07:01  -  13 Aug 2020 07:00  --------------------------------------------------------  IN: 600 mL / OUT: 1476 mL / NET: -876 mL        PHYSICAL EXAM:  GENERAL: NAD, well-developed  CHEST/LUNG: Clear to auscultation bilaterally; No wheeze  HEART: Regular rate and rhythm; No murmurs, rubs, or gallops  ABDOMEN: Soft, Nontender, Nondistended; Bowel sounds present  EXTREMITIES:  2+ Peripheral Pulses, No clubbing, cyanosis, or edema  PSYCH: AAOx3  NEUROLOGY: non-focal  SKIN: No rashes or lesions      LABS:                        12.6   7.19  )-----------( 244      ( 13 Aug 2020 05:30 )             37.2     08-13    134<L>  |  101  |  18  ----------------------------<  84  4.5   |  26  |  1.07    Ca    8.4      13 Aug 2020 05:30                RADIOLOGY & ADDITIONAL TESTS:  Results Reviewed:   Imaging Personally Reviewed:  Electrocardiogram Personally Reviewed:    COORDINATION OF CARE:  Care Discussed with Consultants/Other Providers [Y/N]:  Prior or Outpatient Records Reviewed [Y/N]:

## 2020-08-13 NOTE — PROGRESS NOTE ADULT - COMMENTS
Patient denies any pain. Was looking forward to going home, however while working with PT EOB activities had incident where he "froze" and nearly fell backwards. Patient does not recall incident. also appears more distracted; has deficits in insight and safety awareness, but seems more prominent today. 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 Was looking forward to going home, however while working with PT EOB activities had incident where he "froze" and nearly fell backwards. Patient does not recall incident. also appears more distracted; has deficits in insight and safety awareness, but seems more prominent today. when discussing recommendations NOT to drive, and need for medical clearance to drive given encephalitis, dystonia and SZ, reports that he has no difficulty driving and then becomes tangential, talking about [facility] and alternate side of street parking, etc. 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

## 2020-08-14 ENCOUNTER — TRANSCRIPTION ENCOUNTER (OUTPATIENT)
Age: 75
End: 2020-08-14

## 2020-08-14 LAB
ANION GAP SERPL CALC-SCNC: 9 MMOL/L — SIGNIFICANT CHANGE UP (ref 5–17)
BUN SERPL-MCNC: 14 MG/DL — SIGNIFICANT CHANGE UP (ref 7–23)
CALCIUM SERPL-MCNC: 8.5 MG/DL — SIGNIFICANT CHANGE UP (ref 8.4–10.5)
CHLORIDE SERPL-SCNC: 102 MMOL/L — SIGNIFICANT CHANGE UP (ref 96–108)
CO2 SERPL-SCNC: 24 MMOL/L — SIGNIFICANT CHANGE UP (ref 22–31)
CREAT SERPL-MCNC: 1.01 MG/DL — SIGNIFICANT CHANGE UP (ref 0.5–1.3)
GLUCOSE SERPL-MCNC: 94 MG/DL — SIGNIFICANT CHANGE UP (ref 70–99)
POTASSIUM SERPL-MCNC: 4.5 MMOL/L — SIGNIFICANT CHANGE UP (ref 3.5–5.3)
POTASSIUM SERPL-SCNC: 4.5 MMOL/L — SIGNIFICANT CHANGE UP (ref 3.5–5.3)
SODIUM SERPL-SCNC: 135 MMOL/L — SIGNIFICANT CHANGE UP (ref 135–145)

## 2020-08-14 PROCEDURE — 99223 1ST HOSP IP/OBS HIGH 75: CPT

## 2020-08-14 PROCEDURE — 99232 SBSQ HOSP IP/OBS MODERATE 35: CPT

## 2020-08-14 RX ADMIN — SIMVASTATIN 10 MILLIGRAM(S): 20 TABLET, FILM COATED ORAL at 21:32

## 2020-08-14 RX ADMIN — ENOXAPARIN SODIUM 40 MILLIGRAM(S): 100 INJECTION SUBCUTANEOUS at 12:03

## 2020-08-14 RX ADMIN — Medication 250 MILLIGRAM(S): at 06:18

## 2020-08-14 RX ADMIN — FINASTERIDE 5 MILLIGRAM(S): 5 TABLET, FILM COATED ORAL at 12:03

## 2020-08-14 RX ADMIN — Medication 250 MILLIGRAM(S): at 18:59

## 2020-08-14 NOTE — PROGRESS NOTE ADULT - ASSESSMENT
DENA FARNSWORTH is a 76yo right-handed male with PMHx of BPH, HTN, and HLD who presented to [facility] on 07/21/2020 with intermittent b/l UE and LE spasms and weakness, found to have Limbic Encephalitis and temporal seizures s/p PLEX x5 and Rituxan x1. Admitted for multidisciplinary rehab program.     #Debility  -Comprehensive Multidisciplinary Rehab Program: Gait, ADLs  -PT/OT    #Limbic encephalitis  -s/p PLEX x5, last session 7/31  -s/p Rituxan x1, on 8/3  -c/w Valproic acid 250mg BID  -Will require monthly IVIG and Rituxan every 6 months  -Neurology recs noted; Follow up EEG    #HTN  -Overall well controlled off antihypertensive medications  -Follow up with orthostatic vitals  -Recommend discharge home without home antihypertensive medication; Amlodipine 5mg  -Continue to monitor vitals    #Bradycardia  -Asymptomatic  -ECHO reviewed; Normal  -Avoid using BBlockers   -Continue to monitor vitals    #HLD  - c/w simvastatin 10mg qhs    #BPH  - c/w finasteride 5mg once daily  -monitor voiding, PVRs    #Leukocytosis  -Resolve  -Likely reactive, not infectious  -No need to monitor daily labs    #DVT prophylaxis:  - Lovenox 40mg once daily

## 2020-08-14 NOTE — DISCHARGE NOTE NURSING/CASE MANAGEMENT/SOCIAL WORK - PATIENT PORTAL LINK FT
You can access the FollowMyHealth Patient Portal offered by [facility] by registering at the following website: [email or web address]. By joining [employer]’s FollowMyHealth portal, you will also be able to view your health information using other applications (apps) compatible with our system.

## 2020-08-14 NOTE — PROGRESS NOTE ADULT - SUBJECTIVE AND OBJECTIVE BOX
Tho Brewer M.D. 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 No acute events overnight.  Pt denies cp, palpitations, sob, abd pain, N/V, fever, chills.    ROS:  All other review of systems negative    Allergies    No Known Allergies    Intolerances        MEDICATIONS  (STANDING):  enoxaparin Injectable 40 milliGRAM(s) SubCutaneous daily  finasteride 5 milliGRAM(s) Oral daily  simvastatin 10 milliGRAM(s) Oral at bedtime  valproic acid 250 milliGRAM(s) Oral two times a day    MEDICATIONS  (PRN):  acetaminophen   Tablet .. 650 milliGRAM(s) Oral every 6 hours PRN Mild Pain (1 - 3)  polyethylene glycol 3350 17 Gram(s) Oral daily PRN Constipation  senna 2 Tablet(s) Oral at bedtime PRN Constipation      Vital Signs Last 24 Hrs  T(C): 36.5 (13 Aug 2020 21:09), Max: 36.5 (13 Aug 2020 09:27)  T(F): 97.7 (13 Aug 2020 21:09), Max: 97.7 (13 Aug 2020 09:27)  HR: 69 (13 Aug 2020 21:09) (56 - 69)  BP: 127/68 (13 Aug 2020 21:09) (127/68 - 136/76)  BP(mean): --  RR: 15 (13 Aug 2020 21:09) (15 - 15)  SpO2: 96% (13 Aug 2020 21:09) (95% - 96%)  CAPILLARY BLOOD GLUCOSE        I&O's Summary    13 Aug 2020 07:01  -  14 Aug 2020 07:00  --------------------------------------------------------  IN: 0 mL / OUT: 600 mL / NET: -600 mL        PHYSICAL EXAM:  GENERAL: NAD, well-developed  CHEST/LUNG: Clear to auscultation bilaterally; No wheeze  HEART: Regular rate and rhythm; No murmurs, rubs, or gallops  ABDOMEN: Soft, Nontender, Nondistended; Bowel sounds present  EXTREMITIES:  2+ Peripheral Pulses, No clubbing, cyanosis, or edema  PSYCH: AAOx3  NEUROLOGY: non-focal  SKIN: No rashes or lesions    LABS:                        12.6   7.19  )-----------( 244      ( 13 Aug 2020 05:30 )             37.2     08-14    135  |  102  |  14  ----------------------------<  94  4.5   |  24  |  1.01    Ca    8.5      14 Aug 2020 05:50                RADIOLOGY & ADDITIONAL TESTS:  Results Reviewed:   Imaging Personally Reviewed:  Electrocardiogram Personally Reviewed:    COORDINATION OF CARE:  Care Discussed with Consultants/Other Providers [Y/N]:  Prior or Outpatient Records Reviewed [Y/N]:

## 2020-08-14 NOTE — CONSULT NOTE ADULT - SUBJECTIVE AND OBJECTIVE BOX
hx from review of [facility] records, discussion with Dr. Justice (epileptologist who treated pt at [facility]) as documented on yesterday's note, Dr. Gusman, resident Dr. Cooper    as per Dr. Gusman's note:  Shawn Alan is a 76yo right-handed male with PMHx of  HTN, HLD, and BPH who presented to [facility] 7/21/20 with frequent, intermittent left upper extremity spasms and weakness since the first week of June that progressed to include his right upper and lower extremity since late June, interfering with daily activities. Patient reports left ear pain prior to symptoms followed by stiffness of his left upper extremity that he describes as spasms, accompanied by shaking. He then noted involvement of his right upper and right lower extremity. The shaking was usually followed by weakness, which lasted a few minutes before regaining full function. He's broken dishes multiple times, as holding a weighted object may precipitate these episodes. He's also suffered multiple falls since June, most recently a couple weeks prior to admission that he attributes to loss of balance. He denied any syncope, loss of consciousness, tongue biting, or urinary incontinence, history of a neurological disorder or seizures. He lives alone and says that the episodes have not been witnessed.    Patient had an MRI brain without contrast after going to a private neurologist which found: Asymmetric FLAIR hyperintensity in the right limbic lobe involving the hippocampal gyrus. Differential diagnosis includes, but is not limited to, infectious/inflammatory encephalitis, limbic encephalitis and sequela of epilepsy/post ictal edema. Additional mild chronic microvascular changes without acute infarct.    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 He underwent an MRI at [facility], which noted redemonstration of hyperintense signals within medial right temporal lobe with associated subtle swelling. EEG was performed and noted electrographical seizures in the right temporal region with spread to the left. LP was performed with no findings c/w infection; CSF studies for encephalopthy and paraneoplastic panel are pending. On bloodwork, patient was found to be LGI1 positive(1:160) and was started on PLEX on 7/23, now s/p 5 doses, last dose 7/31. In addition, he also received Rituxan (rest to continue as outpatient) on 8/3 with IV Solumedrol.    Of note, patient also had CT chest, abdomen, and pelvis, which showed chronic tree-in-bud opacities. Pulmonology was consulted, as there was concern encephalitis may precede tumor progression up to 2 years. Per pulmonology, no suspicion of active infection or pulmonary neoplastic process; no need for f/u CT imaging. Hospital course was also c/b bradycardia, but VS otherwise were stable.     Patient was evaluated by PM&R and therapy for functional deficits and gait/ ADL impairments and recommended acute rehabilitation. Patient was medically optimized for discharge to [facility] on 08/04/2020. (04 Aug 2020 14:05)--End of record--    As per primary team patient had episode of blank stare and falling backward yesterday during his therapy session.  Team was debating whether to discharge him yesterday however towards the end of this day patient was kept overnight to make sure that he has no altered sensorium.  Phone call was made to Dr. Justice yesterday and she did not recommend any change in Depakote as she did not think it was any seizure activity.  She thought that it might be due to some autonomic dysfunction as can be expected with his diagnosis.     Patient was seen around 530 this morning.   Alert and oriented to self, hospital, date, and the reason he was here.  Speech fluent and language intact  The right nasolabial fold is not as prominent when compared to the other side. 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  Will recommend checking orthostatic blood pressure.  Will also recommend checking EEG.  Will Dr. Cooper (resident).

## 2020-08-14 NOTE — PROGRESS NOTE ADULT - SUBJECTIVE AND OBJECTIVE BOX
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 7/21/20 with frequent, intermittent left upper extremity spasms and weakness since the first week of June that progressed to include his right upper and lower extremity since late June, interfering with daily activities. Patient reports left ear pain prior to symptoms followed by stiffness of his left upper extremity that he describes as spasms, accompanied by shaking. He then noted involvement of his right upper and right lower extremity. The shaking was usually followed by weakness, which lasted a few minutes before regaining full function. He's broken dishes multiple times, as holding a weighted object may precipitate these episodes. He's also suffered multiple falls since June, most recently a couple weeks prior to admission that he attributes to loss of balance. He denied any syncope, loss of consciousness, tongue biting, or urinary incontinence, history of a neurological disorder or seizures. He lives alone and says that the episodes have not been witnessed.    Patient had an MRI brain without contrast after going to a private neurologist which found: Asymmetric FLAIR hyperintensity in the right limbic lobe involving the hippocampal gyrus. Differential diagnosis includes, but is not limited to, infectious/inflammatory encephalitis, limbic encephalitis and sequela of epilepsy/post ictal edema. Additional mild chronic microvascular changes without acute infarct.    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 He underwent an MRI at [facility], which noted redemonstration of hyperintense signals within medial right temporal lobe with associated subtle swelling. EEG was performed and noted electrographical seizures in the right temporal region with spread to the left. LP was performed with no findings c/w infection; CSF studies for encephalopthy and paraneoplastic panel are pending. On bloodwork, patient was found to be LGI1 positive(1:160) and was started on PLEX on 7/23, now s/p 5 doses, last dose 7/31. In addition, he also received Rituxan (rest to continue as outpatient) on 8/3 with IV Solumedrol.    Of note, patient also had CT chest, abdomen, and pelvis, which showed chronic tree-in-bud opacities. Pulmonology was consulted, as there was concern encephalitis may precede tumor progression up to 2 years. Per pulmonology, no suspicion of active infection or pulmonary neoplastic process; no need for f/u CT imaging. Hospital course was also c/b bradycardia, but VS otherwise were stable.     Patient was evaluated by PM&R and therapy for functional deficits and gait/ ADL impairments and recommended acute rehabilitation. Patient was medically optimized for discharge to [facility] on 08/04/2020. (04 Aug 2020 14:05)      PAST MEDICAL & SURGICAL HISTORY:  Hypertension  HLD (hyperlipidemia)  BPH (benign prostatic hyperplasia)  No significant past surgical history      MEDICATIONS  (STANDING):  enoxaparin Injectable 40 milliGRAM(s) SubCutaneous daily  finasteride 5 milliGRAM(s) Oral daily  simvastatin 10 milliGRAM(s) Oral at bedtime  valproic acid 250 milliGRAM(s) Oral two times a day    MEDICATIONS  (PRN):  acetaminophen   Tablet .. 650 milliGRAM(s) Oral every 6 hours PRN Mild Pain (1 - 3)  polyethylene glycol 3350 17 Gram(s) Oral daily PRN Constipation  senna 2 Tablet(s) Oral at bedtime PRN Constipation      Allergies    No Known Allergies    Intolerances          VITALS  75y  Vital Signs Last 24 Hrs  T(C): 36.5 (13 Aug 2020 21:09), Max: 36.5 (13 Aug 2020 21:09)  T(F): 97.7 (13 Aug 2020 21:09), Max: 97.7 (13 Aug 2020 21:09)  HR: 69 (13 Aug 2020 21:09) (69 - 69)  BP: 127/68 (13 Aug 2020 21:09) (127/68 - 127/68)  BP(mean): --  RR: 15 (13 Aug 2020 21:09) (15 - 15)  SpO2: 96% (13 Aug 2020 21:09) (96% - 96%)  Daily     Daily         RECENT LABS:                          12.6   7.19  )-----------( 244      ( 13 Aug 2020 05:30 )             37.2     08-14    135  |  102  |  14  ----------------------------<  94  4.5   |  24  |  1.01    Ca    8.5      14 Aug 2020 05:50                CAPILLARY BLOOD GLUCOSE        EEG pending

## 2020-08-14 NOTE — PROGRESS NOTE ADULT - ASSESSMENT
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 Admitted for multidisciplinary rehab program.     #Comprehensive Multidisciplinary Rehab Program: Gait, ADL, Functional impairments secondary to limbic encephalitis and temporal SZ  - continue PT/OT/SLP 3 hours a day 5 days a week  - neuropsychology assessment 8/12 read and appreciated: Pt appears to be competent to decide on discharge home matters, even if partially. He is able to express and maintain a choice, i.e., not hiring external supervision at home. He appears able to understand relevant information about his condition, recommended tx, risks/benefits of supervision and lack of it. He also appears able to appreciate both his situation and implications including the risk of falls and further injury.  Prec: fall, SZ, cognition    #Limbic encephalitis  - s/p PLEX x5, last session 7/31  - s/p Rituxan x1, on 8/3 and IV solumedrol  - c/w Valproic acid 250mg BID (plan to taper once DC from rehab with neuro f/u)  -depakote level 8/10-->43. Will give extra dose 500 mg tonight given +EEG findings and continue 250 bid as plan is for eventual dc  - per neuro recommendations from [facility], plan for monthly IVIG and Rituxan every 6 months. discussed with patient and HCP  -EEG performed this AM. Follow up on official read and neurology recommendations on [facility]. If cleared by neuro, for dc later today as patient and HCP prefer discharge this afternoon due to scheduling issues    #leukocytosis: resolved  WBC-->8.38 8/10-->7.19 8/13  -PCP f/u on dc    #PIEDAD  -resolved  -BMP 8/10-->17/1.09 i-->18/1.07 8/13    #hyponatremia  -Na 134 8/13-->135 8/14  -stable    #HTN  - on amlodipine 5mg once daily at home  - stable off medications   -PCP f/u on dc    #HLD  - c/w simvastatin 10mg qhs    #BPH/  - c/w finasteride 5mg once daily   -periods of incontinence. UA 8/9 negative  -PCP/ f/u on dc    #GI/Bowel:  - Senna 2 tabs daily, Miralax once daily PRN    #Diet:    - Regular    #DVT prophylaxis:  - Lovenox 40mg once daily  - SCDs    #Case discussedi n IDT rounds 8/10:  Patient requires supervision/CG ambulation, reduced balance, supervision LE dressing, min assist bathing and toileting, CG tub transfers. Reduced initiation and difficulties in sequencing, reduced insight. Periods incontinence B/B and lives alone  -goals: supervision all bADLs and transfers. Recommend supervision iADLs and during waking hours. Will need to discuss possible hiring  on dc  -Recommended for supervision all waking hours on dc, including assistance for finances, meals, medications and appointments. not cleared for driving due to dystonia, SZ and cognitive impairment also discussed in detail  -Case discussed in great detail with HCP Belinda Lawson [phone] 8/13. update re: neuro recommendations also done this morning for dc planning    #Labs:  EEG  --------------------------------------------  Outpatient Follow up:  Neurology - Dr. Scott  PMD - Dr. Ovalle  --------------------------------------------

## 2020-08-14 NOTE — PROGRESS NOTE ADULT - COMMENTS
Patient seenin wheelchair. Looks less confused today, but very irritable. Was refusing PT, states that people are blowing up everything, that incident yesterday with near-loss balance did not happen , and that he came here for rehab and not to repeat workup. Was initially refusing EEG, but after discussions as to why neuro was called and why it was recommended, was agreeable to it.    No dystonia noted, no dysarthria

## 2020-08-15 VITALS — OXYGEN SATURATION: 96 % | RESPIRATION RATE: 15 BRPM | TEMPERATURE: 98 F

## 2020-08-15 PROCEDURE — 80053 COMPREHEN METABOLIC PANEL: CPT

## 2020-08-15 PROCEDURE — 97129 THER IVNTJ 1ST 15 MIN: CPT

## 2020-08-15 PROCEDURE — 97163 PT EVAL HIGH COMPLEX 45 MIN: CPT

## 2020-08-15 PROCEDURE — 97116 GAIT TRAINING THERAPY: CPT

## 2020-08-15 PROCEDURE — U0003: CPT

## 2020-08-15 PROCEDURE — 92610 EVALUATE SWALLOWING FUNCTION: CPT

## 2020-08-15 PROCEDURE — 85027 COMPLETE CBC AUTOMATED: CPT

## 2020-08-15 PROCEDURE — 80048 BASIC METABOLIC PNL TOTAL CA: CPT

## 2020-08-15 PROCEDURE — 80164 ASSAY DIPROPYLACETIC ACD TOT: CPT

## 2020-08-15 PROCEDURE — 97530 THERAPEUTIC ACTIVITIES: CPT

## 2020-08-15 PROCEDURE — 99239 HOSP IP/OBS DSCHRG MGMT >30: CPT

## 2020-08-15 PROCEDURE — 95812 EEG 41-60 MINUTES: CPT

## 2020-08-15 PROCEDURE — 99232 SBSQ HOSP IP/OBS MODERATE 35: CPT

## 2020-08-15 PROCEDURE — 97110 THERAPEUTIC EXERCISES: CPT

## 2020-08-15 PROCEDURE — 97535 SELF CARE MNGMENT TRAINING: CPT

## 2020-08-15 PROCEDURE — 97112 NEUROMUSCULAR REEDUCATION: CPT

## 2020-08-15 PROCEDURE — 97167 OT EVAL HIGH COMPLEX 60 MIN: CPT

## 2020-08-15 PROCEDURE — 92507 TX SP LANG VOICE COMM INDIV: CPT

## 2020-08-15 RX ORDER — VALPROIC ACID (AS SODIUM SALT) 250 MG/5ML
1 SOLUTION, ORAL ORAL
Qty: 60 | Refills: 0
Start: 2020-08-15 | End: 2020-09-13

## 2020-08-15 RX ORDER — SIMVASTATIN 20 MG/1
1 TABLET, FILM COATED ORAL
Qty: 30 | Refills: 0
Start: 2020-08-15 | End: 2020-09-13

## 2020-08-15 RX ORDER — TIZANIDINE 4 MG/1
0 TABLET ORAL
Qty: 0 | Refills: 0 | DISCHARGE

## 2020-08-15 RX ORDER — SIMVASTATIN 20 MG/1
1 TABLET, FILM COATED ORAL
Qty: 0 | Refills: 0 | DISCHARGE

## 2020-08-15 RX ORDER — FINASTERIDE 5 MG/1
1 TABLET, FILM COATED ORAL
Qty: 30 | Refills: 0
Start: 2020-08-15 | End: 2020-09-13

## 2020-08-15 RX ORDER — AMLODIPINE BESYLATE 2.5 MG/1
1 TABLET ORAL
Qty: 0 | Refills: 0 | DISCHARGE

## 2020-08-15 RX ORDER — FINASTERIDE 5 MG/1
1 TABLET, FILM COATED ORAL
Qty: 0 | Refills: 0 | DISCHARGE

## 2020-08-15 RX ADMIN — FINASTERIDE 5 MILLIGRAM(S): 5 TABLET, FILM COATED ORAL at 12:20

## 2020-08-15 RX ADMIN — Medication 250 MILLIGRAM(S): at 06:06

## 2020-08-15 RX ADMIN — ENOXAPARIN SODIUM 40 MILLIGRAM(S): 100 INJECTION SUBCUTANEOUS at 12:20

## 2020-08-15 NOTE — PROGRESS NOTE ADULT - SUBJECTIVE AND OBJECTIVE BOX
Tho Brewer M.D. Pager Number [phone]    Patient is a 75y old  Male who presents with a chief complaint of Limbic encephalitis and temporal seizures (15 Aug 2020 09:47)      SUBJECTIVE / OVERNIGHT EVENTS:  Pt seen and examined at bedside. No acute events overnight.  Pt denies cp, palpitations, sob, abd pain, N/V, fever, chills.    ROS:  All other review of systems negative    Allergies    No Known Allergies    Intolerances        MEDICATIONS  (STANDING):  enoxaparin Injectable 40 milliGRAM(s) SubCutaneous daily  finasteride 5 milliGRAM(s) Oral daily  simvastatin 10 milliGRAM(s) Oral at bedtime  valproic acid 250 milliGRAM(s) Oral two times a day    MEDICATIONS  (PRN):  acetaminophen   Tablet .. 650 milliGRAM(s) Oral every 6 hours PRN Mild Pain (1 - 3)  polyethylene glycol 3350 17 Gram(s) Oral daily PRN Constipation  senna 2 Tablet(s) Oral at bedtime PRN Constipation      Vital Signs Last 24 Hrs  T(C): 36.7 (15 Aug 2020 09:14), Max: 36.8 (14 Aug 2020 21:29)  T(F): 98.1 (15 Aug 2020 09:14), Max: 98.3 (14 Aug 2020 21:29)  HR: 59 (14 Aug 2020 21:29) (59 - 70)  BP: 121/72 (14 Aug 2020 21:29) (108/69 - 121/72)  BP(mean): --  RR: 15 (15 Aug 2020 09:14) (15 - 15)  SpO2: 96% (15 Aug 2020 09:14) (96% - 98%)  CAPILLARY BLOOD GLUCOSE        I&O's Summary    14 Aug 2020 07:01  -  15 Aug 2020 07:00  --------------------------------------------------------  IN: 0 mL / OUT: 200 mL / NET: -200 mL        PHYSICAL EXAM:  GENERAL: NAD, well-developed  CHEST/LUNG: Clear to auscultation bilaterally; No wheeze  HEART: Regular rate and rhythm; No murmurs, rubs, or gallops  ABDOMEN: Soft, Nontender, Nondistended; Bowel sounds present  EXTREMITIES:  2+ Peripheral Pulses, No clubbing, cyanosis, or edema  PSYCH: AAOx3  NEUROLOGY: non-focal  SKIN: No rashes or lesions    LABS:    08-14    135  |  102  |  14  ----------------------------<  94  4.5   |  24  |  1.01    Ca    8.5      14 Aug 2020 05:50                RADIOLOGY & ADDITIONAL TESTS:  Results Reviewed:   Imaging Personally Reviewed:  Electrocardiogram Personally Reviewed:    COORDINATION OF CARE:  Care Discussed with Consultants/Other Providers [Y/N]:  Prior or Outpatient Records Reviewed [Y/N]:

## 2020-08-15 NOTE — PROGRESS NOTE ADULT - EXTREMITIES COMMENTS
no tremors or dystonia noted  no calf swelling +soft, NT no erythema or warmth  no pedal edema
BUE and LE normal tone and ROM  motor 5/5 BUE and LE  +mild dystonia left UE elicited with rapid finger movements and activation multiple muscle groups  no calf wellint +soft, NT
BUE and LE normal tone and ROM  no tremors, no dystonic movement noted  no calf swelling +soft, NT bilaterally
calves soft blaterally NT  healing abrasion left tibial surface  finger tapping--sig improvement in proximal UE dystonia LUE from admission
calves soft, NT no erythema or warmth  no twitching, no tremors  no dystonia noted
no calf swelling +soft, NT no erythema or warmth  no calf TTP  normal tone, no tremors
no calf swelling +soft, NT  no dystonic movements UE and LE noted this AM

## 2020-08-15 NOTE — PROGRESS NOTE ADULT - RS GEN PE MLT RESP DETAILS PC
respirations non-labored/clear to auscultation bilaterally
clear to auscultation bilaterally/respirations non-labored
clear to auscultation bilaterally/respirations non-labored
respirations non-labored/clear to auscultation bilaterally
respirations non-labored/clear to auscultation bilaterally
respirations non-labored/good air movement/clear to auscultation bilaterally
respirations non-labored/clear to auscultation bilaterally

## 2020-08-15 NOTE — PROGRESS NOTE ADULT - COMMENTS
Patient seen sitting up and eating breakfast. NAD, less irritable. Excited about going home today. Deneis any dystonic movements, no pain, no near falls. EEG findings reviewed with patient, continuation of current medications discussed. Importance of follow with neurology and reiteration of plan for IVIG discussed, as well as sooner follow up if notes recurrence dystonia /decline ambulation and self care skills. Verbalized udnerstanding and agreement

## 2020-08-15 NOTE — PROGRESS NOTE ADULT - PROVIDER SPECIALTY LIST ADULT
Hospitalist
Physiatry
Psychology
Rehab Medicine
Rehab Medicine
Hospitalist
Hospitalist
Physiatry

## 2020-08-15 NOTE — PROGRESS NOTE ADULT - ASSESSMENT
DENA FARNSWORTH is a 74yo right-handed male with PMHx of BPH, HTN, and HLD who presented to [facility] on 07/21/2020 with intermittent b/l UE and LE spasms and weakness, found to have Limbic Encephalitis and temporal seizures s/p PLEX x5 and Rituxan x1. Admitted for multidisciplinary rehab program.     #Comprehensive Multidisciplinary Rehab Program: Gait, ADL, Functional impairments secondary to limbic encephalitis and temporal SZ  - continue PT/OT/SLP 3 hours a day 5 days a week  - neuropsychology assessment 8/12 read and appreciated: Pt appears to be competent to decide on discharge home matters, even if partially. He is able to express and maintain a choice, i.e., not hiring external supervision at home. He appears able to understand relevant information about his condition, recommended tx, risks/benefits of supervision and lack of it. He also appears able to appreciate both his situation and implications including the risk of falls and further injury.  Prec: fall, SZ, cognition    #Limbic encephalitis  - s/p PLEX x5, last session 7/31  - s/p Rituxan x1, on 8/3 and IV solumedrol  - c/w Valproic acid 250mg BID (plan to taper once DC from rehab with neuro f/u)  -depakote level 8/10-->43. Will give extra dose 500 mg tonight given +EEG findings and continue 250 bid as plan is for eventual dc  - per neuro recommendations from [facility], plan for monthly IVIG and Rituxan every 6 months. discussed with patient and HCP  -EEG 8/14:  Probably normal EEG with the patient awake and drowsy and with frequent bifrontal eye movement artifact.  The record is without epileptiform discharges. Reiewed with patient, continue depakote  -neuro follow up on discharge      #leukocytosis: resolved  WBC-->8.38 8/10-->7.19 8/13  -PCP f/u on dc    #PIEDAD  -resolved  -BMP 8/10-->17/1.09 i-->18/1.07 8/13    #hyponatremia  -Na 134 8/13-->135 8/14  -stable  -PCP f/u on dc    #HTN  - stable off medications   -PCP f/u on dc    #HLD  - c/w simvastatin 10mg qhs    #BPH/  - c/w finasteride 5mg once daily   -periods of incontinence. UA 8/9 negative  -PCP/ f/u on dc    #GI/Bowel:  - Senna 2 tabs daily, Miralax once daily PRN    #Diet:    - Regular    #DVT prophylaxis:  - Lovenox 40mg once daily  - SCDs    #Case discussedi n IDT rounds 8/10:  Patient requires supervision/CG ambulation, reduced balance, supervision LE dressing, min assist bathing and toileting, CG tub transfers. Reduced initiation and difficulties in sequencing, reduced insight. Periods incontinence B/B and lives alone  -goals: supervision all bADLs and transfers. Recommend supervision iADLs and during waking hours. Will need to discuss possible hiring  on dc  -Recommended for supervision all waking hours on dc, including assistance for finances, meals, medications and appointments. not cleared for driving due to dystonia, SZ and cognitive impairment also discussed in detail  -Case discussed in great detail with HCP Belinda Lawson [phone] 8/13.     #EEG negative, patient medically stable for discharge home today 8/15 with home care referral and home PT, OT, SLP and caregiver support. Discussed with patient who is agreeable  -f/u PCP, neurology and PM+R on dc. Time spent to coordinate discharge including evaluation and education >30 min      --------------------------------------------  Outpatient Follow up:  Neurology - Dr. Scott  PMD - Dr. Ovalle  --------------------------------------------

## 2020-08-15 NOTE — PROGRESS NOTE ADULT - SUBJECTIVE AND OBJECTIVE BOX
Patient is a 75y old  Male who presents with a chief complaint of Limbic encephalitis and temporal seizures (14 Aug 2020 12:53)      HPI:  Shawn Alan is a 76yo right-handed male with PMHx of  HTN, HLD, and BPH who presented to [facility] 7/21/20 with frequent, intermittent left upper extremity spasms and weakness since the first week of June that progressed to include his right upper and lower extremity since late June, interfering with daily activities. Patient reports left ear pain prior to symptoms followed by stiffness of his left upper extremity that he describes as spasms, accompanied by shaking. He then noted involvement of his right upper and right lower extremity. The shaking was usually followed by weakness, which lasted a few minutes before regaining full function. He's broken dishes multiple times, as holding a weighted object may precipitate these episodes. He's also suffered multiple falls since June, most recently a couple weeks prior to admission that he attributes to loss of balance. He denied any syncope, loss of consciousness, tongue biting, or urinary incontinence, history of a neurological disorder or seizures. He lives alone and says that the episodes have not been witnessed.    Patient had an MRI brain without contrast after going to a private neurologist which found: Asymmetric FLAIR hyperintensity in the right limbic lobe involving the hippocampal gyrus. Differential diagnosis includes, but is not limited to, infectious/inflammatory encephalitis, limbic encephalitis and sequela of epilepsy/post ictal edema. Additional mild chronic microvascular changes without acute infarct.    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 He underwent an MRI at [facility], which noted redemonstration of hyperintense signals within medial right temporal lobe with associated subtle swelling. EEG was performed and noted electrographical seizures in the right temporal region with spread to the left. LP was performed with no findings c/w infection; CSF studies for encephalopthy and paraneoplastic panel are pending. On bloodwork, patient was found to be LGI1 positive(1:160) and was started on PLEX on 7/23, now s/p 5 doses, last dose 7/31. In addition, he also received Rituxan (rest to continue as outpatient) on 8/3 with IV Solumedrol.    Of note, patient also had CT chest, abdomen, and pelvis, which showed chronic tree-in-bud opacities. Pulmonology was consulted, as there was concern encephalitis may precede tumor progression up to 2 years. Per pulmonology, no suspicion of active infection or pulmonary neoplastic process; no need for f/u CT imaging. Hospital course was also c/b bradycardia, but VS otherwise were stable.     Patient was evaluated by PM&R and therapy for functional deficits and gait/ ADL impairments and recommended acute rehabilitation. Patient was medically optimized for discharge to [facility] on 08/04/2020. (04 Aug 2020 14:05)      PAST MEDICAL & SURGICAL HISTORY:  Hypertension  HLD (hyperlipidemia)  BPH (benign prostatic hyperplasia)  No significant past surgical history      MEDICATIONS  (STANDING):  enoxaparin Injectable 40 milliGRAM(s) SubCutaneous daily  finasteride 5 milliGRAM(s) Oral daily  simvastatin 10 milliGRAM(s) Oral at bedtime  valproic acid 250 milliGRAM(s) Oral two times a day    MEDICATIONS  (PRN):  acetaminophen   Tablet .. 650 milliGRAM(s) Oral every 6 hours PRN Mild Pain (1 - 3)  polyethylene glycol 3350 17 Gram(s) Oral daily PRN Constipation  senna 2 Tablet(s) Oral at bedtime PRN Constipation      Allergies    No Known Allergies    Intolerances          VITALS  75y  Vital Signs Last 24 Hrs  T(C): 36.7 (15 Aug 2020 09:14), Max: 36.8 (14 Aug 2020 21:29)  T(F): 98.1 (15 Aug 2020 09:14), Max: 98.3 (14 Aug 2020 21:29)  HR: 59 (14 Aug 2020 21:29) (59 - 70)  BP: 121/72 (14 Aug 2020 21:29) (108/69 - 121/72)  BP(mean): --  RR: 15 (15 Aug 2020 09:14) (15 - 15)  SpO2: 96% (15 Aug 2020 09:14) (96% - 98%)  Daily     Daily         RECENT LABS:      08-14    135  |  102  |  14  ----------------------------<  94  4.5   |  24  |  1.01    Ca    8.5      14 Aug 2020 05:50                CAPILLARY BLOOD GLUCOSE          EXAM:  EEG EXTENDED 41-60 MIN      PROCEDURE DATE:  08/14/2020        INTERPRETATION:  Background Pattern and Specific Features: The background is fairly well-organized and symmetric consisting of beta activity and there is bilateral posterior fairly well modulated alpha activity seen. Bifrontal eye movement artifact is seen during the recording. Diffuse theta slowing is seen during drowsiness. . The record does not reveal any definite focal findings persistent asymmetries or epileptiform discharges.    Stimulation: Photic stimulation was unremarkable hyperventilation was not performed.    Impression: Probably normal EEG with the patient awake and drowsy and with frequent bifrontal eye movement artifact.  The record is without epileptiform discharges.                STEVIE WALTON M.D., Attending Neurologist  This document has been electronically signed. Aug 14 2020  5:23PM

## 2020-08-15 NOTE — PROGRESS NOTE ADULT - GASTROINTESTINAL DETAILS
nontender/bowel sounds normal/soft
bowel sounds normal/nontender/soft
bowel sounds normal/nontender/soft
bowel sounds normal/soft/nontender
soft/bowel sounds normal/nontender
soft/nontender/bowel sounds normal
soft/bowel sounds normal/nontender

## 2020-08-15 NOTE — PROGRESS NOTE ADULT - REASON FOR ADMISSION
Limbic encephalitis and temporal seizures

## 2020-08-18 LAB
AMPHIPHYSIN AB TITR CSF: SIGNIFICANT CHANGE UP
CV2 IGG TITR CSF: SIGNIFICANT CHANGE UP
GLIAL NUC TYPE 1 AB TITR CSF: SIGNIFICANT CHANGE UP
HU1 AB TITR CSF IF: SIGNIFICANT CHANGE UP
HU2 AB TITR CSF IF: SIGNIFICANT CHANGE UP
HU3 AB TITR CSF: SIGNIFICANT CHANGE UP
IMMUNOLOGIST REVIEW: SIGNIFICANT CHANGE UP
PARANEOPLASTIC AB PNL SER: SIGNIFICANT CHANGE UP
PARANEOPLASTIC INTERPRETATION, CSF: SIGNIFICANT CHANGE UP
PCA-TR AB TITR CSF: SIGNIFICANT CHANGE UP
PURKINJE CELL CYTOPLASMIC AB TYPE 2: SIGNIFICANT CHANGE UP
PURKINJE CELLS AB TITR CSF IF: SIGNIFICANT CHANGE UP
REFLEX ADDED: SIGNIFICANT CHANGE UP

## 2020-08-22 LAB
CULTURE RESULTS: SIGNIFICANT CHANGE UP
SPECIMEN SOURCE: SIGNIFICANT CHANGE UP

## 2020-08-28 DIAGNOSIS — R26.9 UNSPECIFIED ABNORMALITIES OF GAIT AND MOBILITY: ICD-10-CM

## 2020-08-28 DIAGNOSIS — E78.5 HYPERLIPIDEMIA, UNSPECIFIED: ICD-10-CM

## 2020-08-28 DIAGNOSIS — D72.829 ELEVATED WHITE BLOOD CELL COUNT, UNSPECIFIED: ICD-10-CM

## 2020-08-28 DIAGNOSIS — G40.209 LOCALIZATION-RELATED (FOCAL) (PARTIAL) SYMPTOMATIC EPILEPSY AND EPILEPTIC SYNDROMES WITH COMPLEX PARTIAL SEIZURES, NOT INTRACTABLE, WITHOUT STATUS EPILEPTICUS: ICD-10-CM

## 2020-08-28 DIAGNOSIS — G24.9 DYSTONIA, UNSPECIFIED: ICD-10-CM

## 2020-08-28 DIAGNOSIS — I10 ESSENTIAL (PRIMARY) HYPERTENSION: ICD-10-CM

## 2020-08-28 DIAGNOSIS — G04.81 OTHER ENCEPHALITIS AND ENCEPHALOMYELITIS: ICD-10-CM

## 2020-08-28 DIAGNOSIS — Z79.899 OTHER LONG TERM (CURRENT) DRUG THERAPY: ICD-10-CM

## 2020-08-28 DIAGNOSIS — R35.0 FREQUENCY OF MICTURITION: ICD-10-CM

## 2020-08-28 DIAGNOSIS — N40.1 BENIGN PROSTATIC HYPERPLASIA WITH LOWER URINARY TRACT SYMPTOMS: ICD-10-CM

## 2020-08-28 DIAGNOSIS — R53.81 OTHER MALAISE: ICD-10-CM

## 2020-08-28 DIAGNOSIS — R30.0 DYSURIA: ICD-10-CM

## 2020-08-28 DIAGNOSIS — Z51.89 ENCOUNTER FOR OTHER SPECIFIED AFTERCARE: ICD-10-CM

## 2020-08-28 DIAGNOSIS — E87.1 HYPO-OSMOLALITY AND HYPONATREMIA: ICD-10-CM

## 2020-08-28 DIAGNOSIS — M62.838 OTHER MUSCLE SPASM: ICD-10-CM

## 2020-08-28 DIAGNOSIS — R41.89 OTHER SYMPTOMS AND SIGNS INVOLVING COGNITIVE FUNCTIONS AND AWARENESS: ICD-10-CM

## 2020-08-28 DIAGNOSIS — R39.81 FUNCTIONAL URINARY INCONTINENCE: ICD-10-CM

## 2020-08-28 DIAGNOSIS — R41.82 ALTERED MENTAL STATUS, UNSPECIFIED: ICD-10-CM

## 2020-08-28 DIAGNOSIS — Z87.891 PERSONAL HISTORY OF NICOTINE DEPENDENCE: ICD-10-CM

## 2020-08-28 DIAGNOSIS — R00.1 BRADYCARDIA, UNSPECIFIED: ICD-10-CM

## 2020-08-28 DIAGNOSIS — Z91.81 HISTORY OF FALLING: ICD-10-CM

## 2020-09-02 ENCOUNTER — APPOINTMENT (OUTPATIENT)
Dept: UROLOGY | Facility: CLINIC | Age: 75
End: 2020-09-02
Payer: MEDICARE

## 2020-09-02 VITALS — TEMPERATURE: 97.3 F | HEART RATE: 93 BPM | DIASTOLIC BLOOD PRESSURE: 70 MMHG | SYSTOLIC BLOOD PRESSURE: 119 MMHG

## 2020-09-02 PROCEDURE — 99213 OFFICE O/P EST LOW 20 MIN: CPT

## 2020-09-02 NOTE — ASSESSMENT
[FreeTextEntry1] : Very pleasant 75-year-old gentleman who presents for follow-up of BPH, elevated PSA\par -We again discussed expected decrease in PSA on finasteride\par -Repeat PSA today\par -Continue finasteride\par -Follow-up in 1 year if PSA is stable

## 2020-09-02 NOTE — HISTORY OF PRESENT ILLNESS
[FreeTextEntry1] : Very pleasant 75-year-old gentleman who presents for follow-up of elevated PSA and BPH.  Patient underwent prostate biopsy in 2013 with Dr. Hanley for an elevated PSA of 9.  This demonstrated benign prostate tissue.  He was subsequently started on finasteride and PSA came down to 4.  Last year PSA was 4.08, 2018 PSA was 4.08.  He reports a recent hospitalization for encephalitis from a mosquito bite.  He still reports fatigue.  He reports no difficulty urinating.  No hematuria.  No dysuria.  No flank pain or suprapubic pain.  No other complaints. [Urinary Retention] : no urinary retention [Urinary Urgency] : no urinary urgency [Urinary Frequency] : no urinary frequency [Nocturia] : no nocturia [Straining] : no straining [Weak Stream] : no weak stream [Dysuria] : no dysuria [Hematuria - Gross] : no gross hematuria [Bladder Spasm] : no bladder spasm [Abdominal Pain] : no abdominal pain [Flank Pain] : no flank pain [Fever] : no fever [Fatigue] : no fatigue [Nausea] : no nausea [Anorexia] : no anorexia

## 2020-09-03 LAB — PSA SERPL-MCNC: 4.09 NG/ML

## 2020-09-27 ENCOUNTER — OUTPATIENT (OUTPATIENT)
Dept: OUTPATIENT SERVICES | Facility: HOSPITAL | Age: 75
LOS: 1 days | End: 2020-09-27
Payer: COMMERCIAL

## 2020-09-27 ENCOUNTER — APPOINTMENT (OUTPATIENT)
Dept: MRI IMAGING | Facility: IMAGING CENTER | Age: 75
End: 2020-09-27
Payer: MEDICARE

## 2020-09-27 DIAGNOSIS — Z00.8 ENCOUNTER FOR OTHER GENERAL EXAMINATION: ICD-10-CM

## 2020-09-27 PROCEDURE — 70551 MRI BRAIN STEM W/O DYE: CPT | Mod: 26

## 2020-09-27 PROCEDURE — 70551 MRI BRAIN STEM W/O DYE: CPT

## 2020-11-02 ENCOUNTER — APPOINTMENT (OUTPATIENT)
Dept: NEUROLOGY | Facility: CLINIC | Age: 75
End: 2020-11-02
Payer: MEDICARE

## 2020-11-02 VITALS
DIASTOLIC BLOOD PRESSURE: 76 MMHG | HEART RATE: 84 BPM | WEIGHT: 180 LBS | SYSTOLIC BLOOD PRESSURE: 135 MMHG | HEIGHT: 71 IN | BODY MASS INDEX: 25.2 KG/M2

## 2020-11-02 PROCEDURE — 99215 OFFICE O/P EST HI 40 MIN: CPT

## 2020-11-02 PROCEDURE — 99072 ADDL SUPL MATRL&STAF TM PHE: CPT

## 2020-11-03 ENCOUNTER — TRANSCRIPTION ENCOUNTER (OUTPATIENT)
Age: 75
End: 2020-11-03

## 2020-11-04 NOTE — PHYSICAL EXAM
[FreeTextEntry1] : MS: alert & oriented to person, place time, POOR fund of knowledge and recall for recent and remote events. \par CN: VFF to confrontation, EOM full without nystagmus, PERRL, V1-V3 intact to light touch, face symmetrical, hears finger rub bilaterally, palate elevates symmetrically, shoulders elevate symmetrically, tongue midline\par MOTOR: normal tone x 4 extremities, Power 5/5 proximally and distally bilaterally, no drift, normal rapid alternating movements. \par SENSORY: intact LT x 4 extremities\par REFLEXES: biceps 2+ bilaterally, triceps 2+ bilaterally, brachioradialis 2+ bilaterally, patella 2+ bilaterally, ankle 2+ bilaterally, plantar flexor bilaterally\par COORD: normal FNF, no tremor or dysmetria\par GAIT: normal base, Romberg negative, normal gait, able to walk on toes, heels and tandem.\par

## 2020-11-04 NOTE — HISTORY OF PRESENT ILLNESS
[FreeTextEntry1] : *** 11/02/2020 *** \par \par friend's home phone [phone]\par CELL [phone]\par \par DENA FARNSWORTH is here for initial evaluation following long hospital admission.\par \par Hospital Course\par \par 74 y/o RH male with PMHx BPH, HTN, and HLD presents to ED with intermittent\par left upper extremity spasms and weakness since the first week of June. Patient\anoop reports that symptoms have progressed to include his right upper and lower\par extremity since late June. He states that in the beginning of June, he had pain\par below his left ear. A couple weeks later, patient started to have stiffness of\par his left upper extremity that felt like spasms and was accompanied by shaking.\par He then began to have involvement of his right side with involvement of his\par right upper and right lower extremity. The shaking is usually followed by\par weakness, which lasts a few minutes before he regains full function. The\par weakness in his arms has led him to break dishes multiple times. Patient states\par that holding a weighted object may precipitate the episodes. He does not know\par how often the episodes occur, but says they are very frequent when he does his\par daily activities. He cannot recall exactly when the last one was. The patient\anoop has had multiple falls since June, most recently a couple weeks ago that he\par attributes to loss of balance. He denies syncope, loss of consciousness, tongue\par biting, and urinary incontinence. Patient denies previous history of a\par neurological disorder, including seizures. Patient does not ambulate with\par mechanical assistance at baseline. He lives alone and says that the episodes\par have not been witnessed.\par Patient had an MRI brain without contrast yesterday after going to a private\par neurologist. He states that his friend Belinda ([phone]) will bring in CD\par with images to be reviewed.\par \par Patient denies weight loss, fevers, chills, abdominal pain, cough, chest pain,\par nausea, vomiting, numbness, tingling, headaches, vision changes, sick contacts,\par confusion, memory difficulties, change in behavior/personality, and sleep\par disturbances.\par \par Home medications:\par Finasteride 5mg daily\par Amlodipine 5mg daily\par Simvastatin 10mg nightly\par Tizanidine 2mg at bedtime PRN\par \par \par His serum and CSF was positive for LG1 antibody ( high titers) and he responded very well to a 5 rounds of PLEX, followed by Rituxan x1.\par He was dc to rehab and continue to improved. He had a brain MRI w slight RT atrophy and decreased T2 hyperintensity.\par \par He is now on  TID and he he continue to have LUE muscle spasm( seizures) but no history of falling.\par he has complains of short term memory decline\par

## 2020-11-04 NOTE — ASSESSMENT
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 Increase depakote to 500 ER BID\par 2. PET scan brain \par 3. Whole body PET scan due to history of elevated PSA and oncological association of LG1 w oncological trigger\par 4. neuropsyc evaluation\par 5. Rituxan 100 q 2 weeks and repeat every 6 months\par 6. IVIG 2 grams/kg monthly

## 2020-11-10 ENCOUNTER — LABORATORY RESULT (OUTPATIENT)
Age: 75
End: 2020-11-10

## 2020-11-10 LAB
ALBUMIN SERPL ELPH-MCNC: 3.9 G/DL
ALP BLD-CCNC: 70 U/L
ALT SERPL-CCNC: 12 U/L
ANION GAP SERPL CALC-SCNC: 16 MMOL/L
AST SERPL-CCNC: 12 U/L
BASOPHILS # BLD AUTO: 0.08 K/UL
BASOPHILS NFR BLD AUTO: 0.9 %
BILIRUB SERPL-MCNC: 0.3 MG/DL
BUN SERPL-MCNC: 18 MG/DL
CALCIUM SERPL-MCNC: 9.6 MG/DL
CHLORIDE SERPL-SCNC: 97 MMOL/L
CO2 SERPL-SCNC: 25 MMOL/L
CREAT SERPL-MCNC: 1.1 MG/DL
EOSINOPHIL # BLD AUTO: 0.33 K/UL
EOSINOPHIL NFR BLD AUTO: 3.8 %
GLUCOSE SERPL-MCNC: 95 MG/DL
HCT VFR BLD CALC: 32.3 %
HGB BLD-MCNC: 9.9 G/DL
IMM GRANULOCYTES NFR BLD AUTO: 1.2 %
LYMPHOCYTES # BLD AUTO: 1.25 K/UL
LYMPHOCYTES NFR BLD AUTO: 14.5 %
MAN DIFF?: NORMAL
MCHC RBC-ENTMCNC: 28.6 PG
MCHC RBC-ENTMCNC: 30.7 GM/DL
MCV RBC AUTO: 93.4 FL
MONOCYTES # BLD AUTO: 1.53 K/UL
MONOCYTES NFR BLD AUTO: 17.7 %
NEUTROPHILS # BLD AUTO: 5.34 K/UL
NEUTROPHILS NFR BLD AUTO: 61.9 %
PLATELET # BLD AUTO: 357 K/UL
POTASSIUM SERPL-SCNC: 5 MMOL/L
PROT SERPL-MCNC: 6.4 G/DL
RBC # BLD: 3.46 M/UL
RBC # FLD: 15.3 %
SODIUM SERPL-SCNC: 138 MMOL/L
VALPROATE SERPL-MCNC: 66 UG/ML
WBC # FLD AUTO: 8.63 K/UL

## 2020-11-18 LAB
AMPA-R ABCBA: NEGATIVE
AMPHIPHYSIN IGG TITR SER IF: NEGATIVE TITER
CASPR2-IGG CBA, S: NEGATIVE
CV2 IGG TITR SER: NEGATIVE TITER
GABA-B ABCBA: NEGATIVE
GAD65 AB SER-MCNC: 0 NMOL/L
GLIAL NUC TYPE 1 AB TITR SER: NEGATIVE TITER
HU1 AB TITR SER: NEGATIVE TITER
HU2 AB TITR SER IF: NEGATIVE TITER
HU3 AB TITR SER: NEGATIVE TITER
IMMUNOLOGIST REVIEW: NORMAL
LGI1-IGG CBA, S: POSITIVE
NACHR AB SER-SCNC: 0 NMOL/L
NMDA-R ABCBA: NEGATIVE
PCA-1 AB TITR SER: NEGATIVE TITER
PCA-2 AB TITR SER: NEGATIVE TITER
PCA-TR AB TITR SER: NEGATIVE TITER
REFLEX ADDED: NORMAL
VGCC-N BIND AB SER-SCNC: 0 NMOL/L
VGCC-P/Q BIND AB SER-SCNC: 0 NMOL/L

## 2020-12-22 ENCOUNTER — TRANSCRIPTION ENCOUNTER (OUTPATIENT)
Age: 75
End: 2020-12-22

## 2020-12-22 ENCOUNTER — NON-APPOINTMENT (OUTPATIENT)
Age: 75
End: 2020-12-22

## 2021-01-01 ENCOUNTER — TRANSCRIPTION ENCOUNTER (OUTPATIENT)
Age: 76
End: 2021-01-01

## 2021-01-01 ENCOUNTER — NON-APPOINTMENT (OUTPATIENT)
Age: 76
End: 2021-01-01

## 2021-01-01 ENCOUNTER — APPOINTMENT (OUTPATIENT)
Dept: NEUROLOGY | Facility: CLINIC | Age: 76
End: 2021-01-01
Payer: MEDICARE

## 2021-01-01 ENCOUNTER — APPOINTMENT (OUTPATIENT)
Dept: UROLOGY | Facility: CLINIC | Age: 76
End: 2021-01-01
Payer: MEDICARE

## 2021-01-01 VITALS
BODY MASS INDEX: 26.6 KG/M2 | HEART RATE: 70 BPM | WEIGHT: 190 LBS | HEIGHT: 71 IN | SYSTOLIC BLOOD PRESSURE: 175 MMHG | DIASTOLIC BLOOD PRESSURE: 88 MMHG

## 2021-01-01 DIAGNOSIS — R97.20 ELEVATED PROSTATE, SPECIFIC ANTIGEN [PSA]: ICD-10-CM

## 2021-01-01 DIAGNOSIS — N40.1 BENIGN PROSTATIC HYPERPLASIA WITH LOWER URINARY TRACT SYMPMS: ICD-10-CM

## 2021-01-01 DIAGNOSIS — N13.8 BENIGN PROSTATIC HYPERPLASIA WITH LOWER URINARY TRACT SYMPMS: ICD-10-CM

## 2021-01-01 LAB — PSA SERPL-MCNC: 6.74 NG/ML

## 2021-01-01 PROCEDURE — 99213 OFFICE O/P EST LOW 20 MIN: CPT

## 2021-01-01 PROCEDURE — 99214 OFFICE O/P EST MOD 30 MIN: CPT

## 2021-01-01 RX ORDER — IMMUNE GLOBULIN INFUSION (HUMAN) 100 MG/ML
20 INJECTION, SOLUTION INTRAVENOUS; SUBCUTANEOUS
Qty: 8 | Refills: 11 | Status: ACTIVE | COMMUNITY
Start: 2020-11-05 | End: 1900-01-01

## 2021-01-01 RX ORDER — RITUXIMAB 10 MG/ML
500 INJECTION, SOLUTION INTRAVENOUS
Qty: 4 | Refills: 1 | Status: ACTIVE | COMMUNITY
Start: 2020-11-05 | End: 1900-01-01

## 2021-01-01 RX ORDER — FINASTERIDE 5 MG/1
5 TABLET, FILM COATED ORAL
Qty: 90 | Refills: 3 | Status: ACTIVE | COMMUNITY
Start: 2021-04-28 | End: 1900-01-01

## 2021-01-04 ENCOUNTER — APPOINTMENT (OUTPATIENT)
Dept: NEUROLOGY | Facility: CLINIC | Age: 76
End: 2021-01-04
Payer: MEDICARE

## 2021-01-04 VITALS
BODY MASS INDEX: 25.9 KG/M2 | HEART RATE: 84 BPM | DIASTOLIC BLOOD PRESSURE: 80 MMHG | SYSTOLIC BLOOD PRESSURE: 145 MMHG | WEIGHT: 185 LBS | HEIGHT: 71 IN

## 2021-01-04 VITALS — TEMPERATURE: 97.6 F

## 2021-01-04 PROCEDURE — 99214 OFFICE O/P EST MOD 30 MIN: CPT

## 2021-01-04 PROCEDURE — 99072 ADDL SUPL MATRL&STAF TM PHE: CPT

## 2021-01-04 NOTE — REASON FOR VISIT
[Follow-Up: _____] : a [unfilled] follow-up visit [Post Hospitalization] : a post hospitalization visit [Family Member] : family member

## 2021-01-04 NOTE — HISTORY OF PRESENT ILLNESS
[FreeTextEntry1] : *** 01/04/2021 ***\par \par DENA FARNSWORTH is here for follow up. He has a known diagnosis of AIE w LG1 positive antibody.\par HE responded very well to PLEX/RITUXAN and he is awaiting IVIG chronic treatment and Rituxan 100 Q 2 weeks, q 6 months.\par \par He has difficulties with his memory( improving) and he did not have any seizures since last visit. His PET scan was not done and he will schedule the neuropsych\par \par *** 11/02/2020 *** \par \par friend's home phone [phone]\par CELL [phone]\par \par DENA FARNSWORTH is here for initial evaluation following long hospital admission.\par \par Hospital Course\par \par 76 y/o RH male with PMHx BPH, HTN, and HLD presents to ED with intermittent\par left upper extremity spasms and weakness since the first week of June. Patient\anoop reports that symptoms have progressed to include his right upper and lower\par extremity since late June. He states that in the beginning of June, he had pain\par below his left ear. A couple weeks later, patient started to have stiffness of\par his left upper extremity that felt like spasms and was accompanied by shaking.\par He then began to have involvement of his right side with involvement of his\par right upper and right lower extremity. The shaking is usually followed by\par weakness, which lasts a few minutes before he regains full function. The\par weakness in his arms has led him to break dishes multiple times. Patient states\par that holding a weighted object may precipitate the episodes. He does not know\par how often the episodes occur, but says they are very frequent when he does his\par daily activities. He cannot recall exactly when the last one was. The patient\anoop has had multiple falls since June, most recently a couple weeks ago that he\par attributes to loss of balance. He denies syncope, loss of consciousness, tongue\par biting, and urinary incontinence. Patient denies previous history of a\par neurological disorder, including seizures. Patient does not ambulate with\par mechanical assistance at baseline. He lives alone and says that the episodes\par have not been witnessed.\par Patient had an MRI brain without contrast yesterday after going to a private\par neurologist. He states that his friend Belinda ([phone]) will bring in CD\par with images to be reviewed.\par \par Patient denies weight loss, fevers, chills, abdominal pain, cough, chest pain,\par nausea, vomiting, numbness, tingling, headaches, vision changes, sick contacts,\par confusion, memory difficulties, change in behavior/personality, and sleep\par disturbances.\par \par Home medications:\par Finasteride 5mg daily\par Amlodipine 5mg daily\par Simvastatin 10mg nightly\par Tizanidine 2mg at bedtime PRN\par \par \par His serum and CSF was positive for LG1 antibody ( high titers) and he responded very well to a 5 rounds of PLEX, followed by Rituxan x1.\par He was dc to rehab and continue to improved. He had a brain MRI w slight RT atrophy and decreased T2 hyperintensity.\par \par He is now on  TID and he he continue to have LUE muscle spasm( seizures) but no history of falling.\par he has complains of short term memory decline\par

## 2021-01-04 NOTE — ASSESSMENT
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 he will have a PET scan done in this regard.\par   \par \par Plan:\par \par 1. continue depakote to 500 ER BID\par 2. PET scan brain \par 3. Whole body PET scan due to history of elevated PSA and oncological association of LG1 w oncological trigger\par 4. neuropsyc evaluation\par 5. Rituxan 1000 q 2 weeks and repeat every 6 months\par 6. IVIG 2 grams/kg monthly\par 7. f/u in 4 months

## 2021-01-22 ENCOUNTER — OUTPATIENT (OUTPATIENT)
Dept: OUTPATIENT SERVICES | Facility: HOSPITAL | Age: 76
LOS: 1 days | End: 2021-01-22
Payer: MEDICARE

## 2021-01-22 ENCOUNTER — APPOINTMENT (OUTPATIENT)
Dept: NUCLEAR MEDICINE | Facility: IMAGING CENTER | Age: 76
End: 2021-01-22
Payer: MEDICARE

## 2021-01-22 DIAGNOSIS — G40.209 LOCALIZATION-RELATED (FOCAL) (PARTIAL) SYMPTOMATIC EPILEPSY AND EPILEPTIC SYNDROMES WITH COMPLEX PARTIAL SEIZURES, NOT INTRACTABLE, WITHOUT STATUS EPILEPTICUS: ICD-10-CM

## 2021-01-22 DIAGNOSIS — Z00.8 ENCOUNTER FOR OTHER GENERAL EXAMINATION: ICD-10-CM

## 2021-01-22 DIAGNOSIS — D89.89 OTHER SPECIFIED DISORDERS INVOLVING THE IMMUNE MECHANISM, NOT ELSEWHERE CLASSIFIED: ICD-10-CM

## 2021-01-22 PROCEDURE — 78608 BRAIN IMAGING (PET): CPT | Mod: 26

## 2021-01-22 PROCEDURE — 78608 BRAIN IMAGING (PET): CPT

## 2021-01-22 PROCEDURE — A9552: CPT

## 2021-02-02 ENCOUNTER — TRANSCRIPTION ENCOUNTER (OUTPATIENT)
Age: 76
End: 2021-02-02

## 2021-02-16 ENCOUNTER — LABORATORY RESULT (OUTPATIENT)
Age: 76
End: 2021-02-16

## 2021-02-16 ENCOUNTER — APPOINTMENT (OUTPATIENT)
Dept: DISASTER EMERGENCY | Facility: CLINIC | Age: 76
End: 2021-02-16

## 2021-02-23 ENCOUNTER — LABORATORY RESULT (OUTPATIENT)
Age: 76
End: 2021-02-23

## 2021-02-23 ENCOUNTER — APPOINTMENT (OUTPATIENT)
Dept: DISASTER EMERGENCY | Facility: CLINIC | Age: 76
End: 2021-02-23

## 2021-02-26 ENCOUNTER — APPOINTMENT (OUTPATIENT)
Dept: NEUROLOGY | Facility: CLINIC | Age: 76
End: 2021-02-26
Payer: MEDICARE

## 2021-02-26 PROCEDURE — 99072 ADDL SUPL MATRL&STAF TM PHE: CPT

## 2021-02-26 PROCEDURE — 96139 PSYCL/NRPSYC TST TECH EA: CPT | Mod: NC

## 2021-02-26 PROCEDURE — 96132 NRPSYC TST EVAL PHYS/QHP 1ST: CPT

## 2021-02-26 PROCEDURE — 96121 NUBHVL XM PHY/QHP EA ADDL HR: CPT

## 2021-02-26 PROCEDURE — 96133 NRPSYC TST EVAL PHYS/QHP EA: CPT

## 2021-02-26 PROCEDURE — 96116 NUBHVL XM PHYS/QHP 1ST HR: CPT

## 2021-02-26 PROCEDURE — 96138 PSYCL/NRPSYC TECH 1ST: CPT | Mod: NC

## 2021-03-05 ENCOUNTER — TRANSCRIPTION ENCOUNTER (OUTPATIENT)
Age: 76
End: 2021-03-05

## 2021-04-02 ENCOUNTER — APPOINTMENT (OUTPATIENT)
Dept: NEUROLOGY | Facility: CLINIC | Age: 76
End: 2021-04-02

## 2021-04-05 ENCOUNTER — APPOINTMENT (OUTPATIENT)
Dept: NEUROLOGY | Facility: CLINIC | Age: 76
End: 2021-04-05
Payer: MEDICARE

## 2021-04-05 VITALS
BODY MASS INDEX: 27.3 KG/M2 | SYSTOLIC BLOOD PRESSURE: 140 MMHG | WEIGHT: 195 LBS | HEIGHT: 71 IN | DIASTOLIC BLOOD PRESSURE: 74 MMHG | HEART RATE: 65 BPM

## 2021-04-05 VITALS — TEMPERATURE: 97.7 F

## 2021-04-05 PROCEDURE — 99072 ADDL SUPL MATRL&STAF TM PHE: CPT

## 2021-04-05 PROCEDURE — 99214 OFFICE O/P EST MOD 30 MIN: CPT

## 2021-04-14 NOTE — HISTORY OF PRESENT ILLNESS
[FreeTextEntry1] : *** 04/14/2021 ***\par \par DENA FARNSWORTH is here for follow up.He continue to improve and he he adjust with his memory impairment ( which are not severe).\par His pet scan suggested diffuse hypometabolism but no active seizure focus. This is consistent with his previous autoimmune condition.\par \par *** 01/04/2021 ***\par \par DENA FARNSWORTH is here for follow up. He has a known diagnosis of AIE w LG1 positive antibody.\par HE responded very well to PLEX/RITUXAN and he is awaiting IVIG chronic treatment and Rituxan 100 Q 2 weeks, q 6 months.\par \par He has difficulties with his memory( improving) and he did not have any seizures since last visit. His PET scan was not done and he will schedule the neuropsych\par \par *** 11/02/2020 *** \par \par friend's home phone [phone]\par CELL [phone]\par \par DENA FARNSWORTH is here for initial evaluation following long hospital admission.\par \par Hospital Course\par \par 74 y/o RH male with PMHx BPH, HTN, and HLD presents to ED with intermittent\par left upper extremity spasms and weakness since the first week of June. Patientmere reports that symptoms have progressed to include his right upper and lower\par extremity since late June. He states that in the beginning of June, he had pain\par below his left ear. A couple weeks later, patient started to have stiffness of\par his left upper extremity that felt like spasms and was accompanied by shaking.\par He then began to have involvement of his right side with involvement of his\par right upper and right lower extremity. The shaking is usually followed by\par weakness, which lasts a few minutes before he regains full function. The\par weakness in his arms has led him to break dishes multiple times. Patient states\par that holding a weighted object may precipitate the episodes. He does not know\par how often the episodes occur, but says they are very frequent when he does his\par daily activities. He cannot recall exactly when the last one was. The patient\anoop has had multiple falls since June, most recently a couple weeks ago that he\par attributes to loss of balance. He denies syncope, loss of consciousness, tongue\par biting, and urinary incontinence. Patient denies previous history of a\par neurological disorder, including seizures. Patient does not ambulate with\par mechanical assistance at baseline. He lives alone and says that the episodes\par have not been witnessed.\par Patient had an MRI brain without contrast yesterday after going to a private\par neurologist. He states that his friend Belinda ([phone]) will bring in CD\par with images to be reviewed.\par \par Patient denies weight loss, fevers, chills, abdominal pain, cough, chest pain,\par nausea, vomiting, numbness, tingling, headaches, vision changes, sick contacts,\par confusion, memory difficulties, change in behavior/personality, and sleep\par disturbances.\par \par Home medications:\par Finasteride 5mg daily\par Amlodipine 5mg daily\par Simvastatin 10mg nightly\par Tizanidine 2mg at bedtime PRN\par \par \par His serum and CSF was positive for LG1 antibody ( high titers) and he responded very well to a 5 rounds of PLEX, followed by Rituxan x1.\par He was dc to rehab and continue to improved. He had a brain MRI w slight RT atrophy and decreased T2 hyperintensity.\par \par He is now on  TID and he he continue to have LUE muscle spasm( seizures) but no history of falling.\par he has complains of short term memory decline\par

## 2021-04-14 NOTE — ASSESSMENT
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 he will have a PET scan done in this regard.\par   \par \par Plan:\par \par 1. continue depakote to 500 ER BID\par 2. PET scan brain \par 3. Whole body PET scan due to history of elevated PSA and oncological association of LG1 w oncological trigger\par 4. neuropsyc evaluation done and suggestive of executive dysfunction\par 5. Rituxan 1000 q 2 weeks and repeat every 6 months\par 6. IVIG 2 grams/kg monthly have not been started yet, but was approved\par 7. f/u in 4 months

## 2021-06-25 NOTE — ED PROVIDER NOTE - ATTENDING CONTRIBUTION TO CARE
I performed a history and physical exam of the patient and discussed their management with the resident. I reviewed the resident's note and agree with the documented findings and plan of care. My medical decision making and observations are found above.
Ideal body weight used for calculations as pt >120% of IBW (129% IBW). Needs estimated for age and adjusted for COVID PNA

## 2021-07-07 ENCOUNTER — APPOINTMENT (OUTPATIENT)
Dept: NEUROLOGY | Facility: CLINIC | Age: 76
End: 2021-07-07
Payer: MEDICARE

## 2021-07-07 VITALS
WEIGHT: 195 LBS | SYSTOLIC BLOOD PRESSURE: 154 MMHG | DIASTOLIC BLOOD PRESSURE: 94 MMHG | HEIGHT: 71 IN | BODY MASS INDEX: 27.3 KG/M2 | HEART RATE: 87 BPM

## 2021-07-07 PROCEDURE — 99214 OFFICE O/P EST MOD 30 MIN: CPT

## 2021-07-12 NOTE — HISTORY OF PRESENT ILLNESS
[FreeTextEntry1] : \par *** 07/07/2021 ***\par \par DENA FARNSWORTH is here for follow up. \par he is much improved but continue to have memory complains.\par he uses multiple memory aids\par \par he does not remember the IVIG infusion\par \par *** 04/14/2021 ***\par \par DENA FARNSWORTH is here for follow up.He continue to improve and he he adjust with his memory impairment ( which are not severe).\par His pet scan suggested diffuse hypometabolism but no active seizure focus. This is consistent with his previous autoimmune condition.\par \par *** 01/04/2021 ***\par \anoop FARNSWORTH is here for follow up. He has a known diagnosis of AIE w LG1 positive antibody.\par HE responded very well to PLEX/RITUXAN and he is awaiting IVIG chronic treatment and Rituxan 100 Q 2 weeks, q 6 months.\par \par He has difficulties with his memory( improving) and he did not have any seizures since last visit. His PET scan was not done and he will schedule the neuropsych\par \par *** 11/02/2020 *** \par \par friend's home phone [phone]\par CELL [phone]\par \par DENA FARNSWORTH is here for initial evaluation following long hospital admission.\par \par Hospital Course\par \par 74 y/o RH male with PMHx BPH, HTN, and HLD presents to ED with intermittent\par left upper extremity spasms and weakness since the first week of June. Patient\par reports that symptoms have progressed to include his right upper and lower\par extremity since late June. He states that in the beginning of June, he had pain\par below his left ear. A couple weeks later, patient started to have stiffness of\par his left upper extremity that felt like spasms and was accompanied by shaking.\par He then began to have involvement of his right side with involvement of his\par right upper and right lower extremity. The shaking is usually followed by\par weakness, which lasts a few minutes before he regains full function. The\par weakness in his arms has led him to break dishes multiple times. Patient states\par that holding a weighted object may precipitate the episodes. He does not know\par how often the episodes occur, but says they are very frequent when he does his\par daily activities. He cannot recall exactly when the last one was. The patient\par has had multiple falls since June, most recently a couple weeks ago that he\par attributes to loss of balance. He denies syncope, loss of consciousness, tongue\par biting, and urinary incontinence. Patient denies previous history of a\par neurological disorder, including seizures. Patient does not ambulate with\par mechanical assistance at baseline. He lives alone and says that the episodes\par have not been witnessed.\par Patient had an MRI brain without contrast yesterday after going to a private\par neurologist. He states that his friend Belinda ([phone]) will bring in CD\par with images to be reviewed.\par \par Patient denies weight loss, fevers, chills, abdominal pain, cough, chest pain,\par nausea, vomiting, numbness, tingling, headaches, vision changes, sick contacts,\par confusion, memory difficulties, change in behavior/personality, and sleep\par disturbances.\par \par Home medications:\par Finasteride 5mg daily\par Amlodipine 5mg daily\par Simvastatin 10mg nightly\par Tizanidine 2mg at bedtime PRN\par \par \par His serum and CSF was positive for LG1 antibody ( high titers) and he responded very well to a 5 rounds of PLEX, followed by Rituxan x1.\par He was dc to rehab and continue to improved. He had a brain MRI w slight RT atrophy and decreased T2 hyperintensity.\par \par He is now on  TID and he he continue to have LUE muscle spasm( seizures) but no history of falling.\par he has complains of short term memory decline\par

## 2021-07-19 ENCOUNTER — RX RENEWAL (OUTPATIENT)
Age: 76
End: 2021-07-19

## 2021-09-03 PROBLEM — N40.1 BPH WITH URINARY OBSTRUCTION: Status: ACTIVE | Noted: 2018-05-29

## 2021-09-03 NOTE — ASSESSMENT
[FreeTextEntry1] : Very pleasant 76-year-old gentleman who presents for follow-up of BPH, screening for prostate cancer, elevated PSA\par -PSA last year 4.09\par -Repeat PSA today\par -Continue finasteride–refill sent to the pharmacy\par -Follow-up in 1 year

## 2021-09-03 NOTE — HISTORY OF PRESENT ILLNESS
[FreeTextEntry1] : Very pleasant 76-year-old gentleman presents for follow-up of BPH and screening for prostate cancer. PSA was 4.09 last year on finasteride. It was 4.08 in each of the 2 prior years. He underwent a prostate biopsy with Dr. Hanley in the past demonstrating benign prostate tissue. He reports no urologic problems over the last year. No other complaints. He does report recent episode of encephalitis secondary to mosquito bites

## 2021-10-03 NOTE — ASSESSMENT
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 he will have a PET scan done in this regard.\par   \par \par Plan:\par \par 1. continue depakote to 500 ER BID\par 2. PET scan brain done and with diffuse hypometabolism, no active inflamation.\par 3. Whole body PET scan due to history of elevated PSA and oncological association of LG1 w oncological trigger\par 4. neuropsyc evaluation done and suggestive of executive dysfunction\par 5. Rituxan 1000 q 2 weeks and repeat every 6 months\par 6. IVIG 2 grams/kg monthly have not been started yet, but was approved\par 7. f/u in 4 months

## 2021-10-03 NOTE — HISTORY OF PRESENT ILLNESS
[FreeTextEntry1] : *** 10/01/2021 ***\par \par DENA FARNSWORTH is here for follow up.\par He is doing very well, no seizures no jerking episodes.\par Memory improved. he had received only Rituxan x2, no IVIG( high co pay).\par Will be contacted again by [facility].\par \par *** 07/07/2021 ***\par \par DENA FARNSWORTH is here for follow up. \par he is much improved but continue to have memory complains.\par he uses multiple memory aids\par \par he does not remember the IVIG infusion\par \par *** 04/14/2021 ***\par \par DENA FARNSWORTH is here for follow up.He continue to improve and he he adjust with his memory impairment ( which are not severe).\par His pet scan suggested diffuse hypometabolism but no active seizure focus. This is consistent with his previous autoimmune condition.\par \par *** 01/04/2021 ***\par \par DENA FARNSWORTH is here for follow up. He has a known diagnosis of AIE w LG1 positive antibody.\par HE responded very well to PLEX/RITUXAN and he is awaiting IVIG chronic treatment and Rituxan 100 Q 2 weeks, q 6 months.\par \par He has difficulties with his memory( improving) and he did not have any seizures since last visit. His PET scan was not done and he will schedule the neuropsych\par \par *** 11/02/2020 *** \par \par friend's home phone [phone]\par CELL [phone]\par \par DENA FARNSWORTH is here for initial evaluation following long hospital admission.\par \par Hospital Course\par \par 74 y/o RH male with PMHx BPH, HTN, and HLD presents to ED with intermittent\par left upper extremity spasms and weakness since the first week of June. Patient\par reports that symptoms have progressed to include his right upper and lower\par extremity since late June. He states that in the beginning of June, he had pain\par below his left ear. A couple weeks later, patient started to have stiffness of\par his left upper extremity that felt like spasms and was accompanied by shaking.\par He then began to have involvement of his right side with involvement of his\par right upper and right lower extremity. The shaking is usually followed by\par weakness, which lasts a few minutes before he regains full function. The\par weakness in his arms has led him to break dishes multiple times. Patient states\par that holding a weighted object may precipitate the episodes. He does not know\par how often the episodes occur, but says they are very frequent when he does his\par daily activities. He cannot recall exactly when the last one was. The patient\par has had multiple falls since June, most recently a couple weeks ago that he\par attributes to loss of balance. He denies syncope, loss of consciousness, tongue\par biting, and urinary incontinence. Patient denies previous history of a\par neurological disorder, including seizures. Patient does not ambulate with\par mechanical assistance at baseline. He lives alone and says that the episodes\par have not been witnessed.\par Patient had an MRI brain without contrast yesterday after going to a private\par neurologist. He states that his friend Belinda ([phone]) will bring in CD\par with images to be reviewed.\par \par Patient denies weight loss, fevers, chills, abdominal pain, cough, chest pain,\par nausea, vomiting, numbness, tingling, headaches, vision changes, sick contacts,\par confusion, memory difficulties, change in behavior/personality, and sleep\par disturbances.\par \par Home medications:\par Finasteride 5mg daily\par Amlodipine 5mg daily\par Simvastatin 10mg nightly\par Tizanidine 2mg at bedtime PRN\par \par \par His serum and CSF was positive for LG1 antibody ( high titers) and he responded very well to a 5 rounds of PLEX, followed by Rituxan x1.\par He was dc to rehab and continue to improved. He had a brain MRI w slight RT atrophy and decreased T2 hyperintensity.\par \par He is now on  TID and he he continue to have LUE muscle spasm( seizures) but no history of falling.\par he has complains of short term memory decline\par

## 2021-10-03 NOTE — REASON FOR VISIT
[Follow-Up: _____] : a [unfilled] follow-up visit [Friend] : friend [Post Hospitalization] : a post hospitalization visit [Family Member] : family member

## 2022-01-01 ENCOUNTER — APPOINTMENT (OUTPATIENT)
Dept: NEUROLOGY | Facility: CLINIC | Age: 77
End: 2022-01-01

## 2022-01-01 ENCOUNTER — APPOINTMENT (OUTPATIENT)
Dept: NEUROLOGY | Facility: CLINIC | Age: 77
End: 2022-01-01
Payer: MEDICARE

## 2022-01-01 VITALS
SYSTOLIC BLOOD PRESSURE: 153 MMHG | DIASTOLIC BLOOD PRESSURE: 89 MMHG | BODY MASS INDEX: 26.6 KG/M2 | HEART RATE: 71 BPM | HEIGHT: 71 IN | WEIGHT: 190 LBS

## 2022-01-01 DIAGNOSIS — G04.81 OTHER ENCEPHALITIS AND ENCEPHALOMYELITIS: ICD-10-CM

## 2022-01-01 DIAGNOSIS — D89.89 OTHER SPECIFIED DISORDERS INVOLVING THE IMMUNE MECHANISM, NOT ELSEWHERE CLASSIFIED: ICD-10-CM

## 2022-01-01 DIAGNOSIS — G40.209 LOCALIZATION-RELATED (FOCAL) (PARTIAL) SYMPTOMATIC EPILEPSY AND EPILEPTIC SYNDROMES WITH COMPLEX PARTIAL SEIZURES, NOT INTRACTABLE, W/OUT STATUS EPILEPTICUS: ICD-10-CM

## 2022-01-01 PROCEDURE — 99214 OFFICE O/P EST MOD 30 MIN: CPT

## 2022-01-01 RX ORDER — DIVALPROEX SODIUM 500 MG/1
500 TABLET, DELAYED RELEASE ORAL
Qty: 180 | Refills: 1 | Status: ACTIVE | COMMUNITY
Start: 2020-11-02 | End: 1900-01-01

## 2022-03-30 NOTE — PROGRESS NOTE ADULT - ASSESSMENT
75y Male without  a sig history  w recent c/o episodes of stiffening then jerking of L arm sometimes followed by R side lasting seconds,  ? 5-10x/day, he denies HA, visual memory cognitive sx, lateralized sx in btw events  has been bradycardic and cardiology consulted for evaluation. Rhofade Counseling: Rhofade is a topical medication which can decrease superficial blood flow where applied. Side effects are uncommon and include stinging, redness and allergic reactions.

## 2022-05-03 PROBLEM — D89.89 AUTOIMMUNE DISORDER: Status: ACTIVE | Noted: 2020-11-02

## 2022-05-03 PROBLEM — G40.209 LOCALIZATION-RELATED FOCAL EPILEPSY WITH COMPLEX PARTIAL SEIZURES: Status: ACTIVE | Noted: 2020-11-02

## 2022-05-03 PROBLEM — G04.81: Status: ACTIVE | Noted: 2022-01-01

## 2022-07-22 ENCOUNTER — TRANSCRIPTION ENCOUNTER (OUTPATIENT)
Age: 77
End: 2022-07-22

## 2022-09-06 ENCOUNTER — APPOINTMENT (OUTPATIENT)
Dept: NEUROLOGY | Facility: CLINIC | Age: 77
End: 2022-09-06

## 2023-03-31 NOTE — PROGRESS NOTE ADULT - ASSESSMENT
DENA FARNSWORTH is a 74yo right-handed male with PMHx of BPH, HTN, and HLD who presented to [facility] on 07/21/2020 with intermittent b/l UE and LE spasms and weakness, found to have Limbic Encephalitis and temporal seizures s/p PLEX x5 and Rituxan x1. Admitted for multidisciplinary rehab program.     #Debility  -Comprehensive Multidisciplinary Rehab Program: Gait, ADLs  -PT/OT    #Limbic encephalitis  -s/p PLEX x5, last session 7/31  -s/p Rituxan x1, on 8/3  -c/w Valproic acid 250mg BID  -Will require monthly IVIG and Rituxan every 6 months  -Neurology recs noted  -EEG normal    #HTN  -Overall well controlled off antihypertensive medications  -Follow up with orthostatic vitals  -Recommend discharge home without home antihypertensive medication; Amlodipine 5mg  -Continue to monitor vitals    #Bradycardia  -Asymptomatic  -ECHO reviewed; Normal  -Avoid using BBlockers   -Continue to monitor vitals    #HLD  - c/w simvastatin 10mg qhs    #BPH  - c/w finasteride 5mg once daily  -monitor voiding, PVRs    #Leukocytosis  -Resolve  -Likely reactive, not infectious  -No need to monitor daily labs    #DVT prophylaxis:  - Lovenox 40mg once daily    Stable for discharge from medical standpoint This was a shared visit with the TINO. I reviewed and verified the documentation and independently performed the documented:

## 2023-11-15 ENCOUNTER — TRANSCRIPTION ENCOUNTER (OUTPATIENT)
Age: 78
End: 2023-11-15

## 2025-04-21 NOTE — PATIENT PROFILE ADULT - VISION (WITH CORRECTIVE LENSES IF THE PATIENT USUALLY WEARS THEM):
Addressed message in original encounter from 4/17/25   Normal vision: sees adequately in most situations; can see medication labels, newsprint dietitian/nutrition services/social work/services/lactation